# Patient Record
Sex: MALE | Race: BLACK OR AFRICAN AMERICAN | NOT HISPANIC OR LATINO | Employment: FULL TIME | URBAN - METROPOLITAN AREA
[De-identification: names, ages, dates, MRNs, and addresses within clinical notes are randomized per-mention and may not be internally consistent; named-entity substitution may affect disease eponyms.]

---

## 2017-08-07 ENCOUNTER — OFFICE VISIT (OUTPATIENT)
Dept: OPTOMETRY | Facility: CLINIC | Age: 47
End: 2017-08-07
Payer: COMMERCIAL

## 2017-08-07 DIAGNOSIS — H52.4 HYPEROPIA WITH PRESBYOPIA, BILATERAL: ICD-10-CM

## 2017-08-07 DIAGNOSIS — E11.9 TYPE 2 DIABETES MELLITUS WITHOUT RETINOPATHY: Primary | ICD-10-CM

## 2017-08-07 DIAGNOSIS — H52.03 HYPEROPIA WITH PRESBYOPIA, BILATERAL: ICD-10-CM

## 2017-08-07 DIAGNOSIS — H25.13 NUCLEAR SCLEROSIS, BILATERAL: ICD-10-CM

## 2017-08-07 PROCEDURE — 99999 PR PBB SHADOW E&M-EST. PATIENT-LVL II: CPT | Mod: PBBFAC,,, | Performed by: OPTOMETRIST

## 2017-08-07 PROCEDURE — 92014 COMPRE OPH EXAM EST PT 1/>: CPT | Mod: S$GLB,,, | Performed by: OPTOMETRIST

## 2017-08-07 PROCEDURE — 92015 DETERMINE REFRACTIVE STATE: CPT | Mod: S$GLB,,, | Performed by: OPTOMETRIST

## 2017-08-07 NOTE — PROGRESS NOTES
"Subjective:       Patient ID: Jaswinder Valle is a 47 y.o. male      Chief Complaint   Patient presents with    Diabetic Eye Exam     History of Present Illness  Dls: 6/30/16 Dr. Mora    Pt here for diabetic eye exam.   Pt c/o blurry vision at near ou. Pt wears single vision glasses for   reading and otc readers. Pt states no tearing no itching no burning no   pain no ha's no floaters.     Eye meds:   None    Hemoglobin A1C       Date                     Value               Ref Range           Status                06/29/2016               8.0 (H)             4.5 - 6.2 %         Final                 10/18/2011               8.4 (H)             4.0 - 6.2 %         Final            ----------        Assessment/Plan:     1. Type 2 diabetes mellitus without retinopathy  No diabetic retinopathy. Educated patient on importance of good blood sugar control, compliance with meds, and follow up care with PCP. Return in 1 year for dilated eye exam, sooner PRN.    2. Nuclear sclerosis, bilateral  Educated patient on the presence of cataracts and effects on vision, including clouded, blurred or dim vision, increasing difficulty with vision at night, sensitivity to light and glare, need for brighter light for reading and other activities, and seeing "halos" around lights. No surgery at this time. Recheck in one year.    3. Hyperopia with presbyopia, bilateral  Educated patient on refractive error and discussed lens options. Dispensed updated spectacle Rx. Educated about adaptation period to new specs.    Eyeglass Final Rx     Eyeglass Final Rx       Sphere Cylinder Axis Add    Right +0.50 Sphere  +1.75    Left +0.50 +0.25 025 +1.75    Expiration Date:  8/8/2018                  Return in about 1 year (around 8/7/2018) for Diabetic Eye Exam.       "

## 2017-09-18 ENCOUNTER — TELEPHONE (OUTPATIENT)
Dept: FAMILY MEDICINE | Facility: CLINIC | Age: 47
End: 2017-09-18

## 2017-09-18 DIAGNOSIS — E11.9 DIABETES MELLITUS TYPE 2 WITHOUT RETINOPATHY: Primary | ICD-10-CM

## 2017-09-18 NOTE — TELEPHONE ENCOUNTER
Patient is schedule on 9-26-17 for his physical he will like labs prior to appt on 9-22-17 please orders labs so I can schedule.

## 2017-09-18 NOTE — TELEPHONE ENCOUNTER
----- Message from Flor Pierce sent at 9/18/2017 11:32 AM CDT -----  Contact: self  Pt calling to schedule a phy and labs. Please call [phone]

## 2017-09-22 ENCOUNTER — LAB VISIT (OUTPATIENT)
Dept: LAB | Facility: HOSPITAL | Age: 47
End: 2017-09-22
Attending: FAMILY MEDICINE
Payer: COMMERCIAL

## 2017-09-22 DIAGNOSIS — E11.9 DIABETES MELLITUS TYPE 2 WITHOUT RETINOPATHY: ICD-10-CM

## 2017-09-22 LAB
ALBUMIN SERPL BCP-MCNC: 3.5 G/DL
ALP SERPL-CCNC: 96 U/L
ALT SERPL W/O P-5'-P-CCNC: 37 U/L
ANION GAP SERPL CALC-SCNC: 9 MMOL/L
AST SERPL-CCNC: 25 U/L
BASOPHILS # BLD AUTO: 0.01 K/UL
BASOPHILS NFR BLD: 0.2 %
BILIRUB SERPL-MCNC: 1.7 MG/DL
BUN SERPL-MCNC: 18 MG/DL
CALCIUM SERPL-MCNC: 8.8 MG/DL
CHLORIDE SERPL-SCNC: 103 MMOL/L
CHOLEST SERPL-MCNC: 199 MG/DL
CHOLEST/HDLC SERPL: 4.7 {RATIO}
CO2 SERPL-SCNC: 27 MMOL/L
CREAT SERPL-MCNC: 1.3 MG/DL
DIFFERENTIAL METHOD: NORMAL
EOSINOPHIL # BLD AUTO: 0.1 K/UL
EOSINOPHIL NFR BLD: 2 %
ERYTHROCYTE [DISTWIDTH] IN BLOOD BY AUTOMATED COUNT: 13.7 %
EST. GFR  (AFRICAN AMERICAN): >60 ML/MIN/1.73 M^2
EST. GFR  (NON AFRICAN AMERICAN): >60 ML/MIN/1.73 M^2
ESTIMATED AVG GLUCOSE: 240 MG/DL
GLUCOSE SERPL-MCNC: 210 MG/DL
HBA1C MFR BLD HPLC: 10 %
HCT VFR BLD AUTO: 42 %
HDLC SERPL-MCNC: 42 MG/DL
HDLC SERPL: 21.1 %
HGB BLD-MCNC: 14.4 G/DL
LDLC SERPL CALC-MCNC: 132.4 MG/DL
LYMPHOCYTES # BLD AUTO: 1.5 K/UL
LYMPHOCYTES NFR BLD: 26.8 %
MCH RBC QN AUTO: 29.1 PG
MCHC RBC AUTO-ENTMCNC: 34.3 G/DL
MCV RBC AUTO: 85 FL
MONOCYTES # BLD AUTO: 0.4 K/UL
MONOCYTES NFR BLD: 6.6 %
NEUTROPHILS # BLD AUTO: 3.6 K/UL
NEUTROPHILS NFR BLD: 64.2 %
NONHDLC SERPL-MCNC: 157 MG/DL
PLATELET # BLD AUTO: 160 K/UL
PMV BLD AUTO: 11.8 FL
POTASSIUM SERPL-SCNC: 3.8 MMOL/L
PROT SERPL-MCNC: 7 G/DL
RBC # BLD AUTO: 4.95 M/UL
SODIUM SERPL-SCNC: 139 MMOL/L
TRIGL SERPL-MCNC: 123 MG/DL
TSH SERPL DL<=0.005 MIU/L-ACNC: 1.05 UIU/ML
WBC # BLD AUTO: 5.63 K/UL

## 2017-09-22 PROCEDURE — 84443 ASSAY THYROID STIM HORMONE: CPT

## 2017-09-22 PROCEDURE — 83036 HEMOGLOBIN GLYCOSYLATED A1C: CPT

## 2017-09-22 PROCEDURE — 85025 COMPLETE CBC W/AUTO DIFF WBC: CPT

## 2017-09-22 PROCEDURE — 36415 COLL VENOUS BLD VENIPUNCTURE: CPT | Mod: PO

## 2017-09-22 PROCEDURE — 80061 LIPID PANEL: CPT

## 2017-09-22 PROCEDURE — 80053 COMPREHEN METABOLIC PANEL: CPT

## 2017-09-26 ENCOUNTER — OFFICE VISIT (OUTPATIENT)
Dept: FAMILY MEDICINE | Facility: CLINIC | Age: 47
End: 2017-09-26
Payer: COMMERCIAL

## 2017-09-26 VITALS
BODY MASS INDEX: 28.78 KG/M2 | DIASTOLIC BLOOD PRESSURE: 82 MMHG | WEIGHT: 201.06 LBS | OXYGEN SATURATION: 97 % | SYSTOLIC BLOOD PRESSURE: 110 MMHG | HEIGHT: 70 IN | HEART RATE: 82 BPM | TEMPERATURE: 98 F

## 2017-09-26 DIAGNOSIS — Z23 NEED FOR 23-POLYVALENT PNEUMOCOCCAL POLYSACCHARIDE VACCINE: ICD-10-CM

## 2017-09-26 DIAGNOSIS — Z00.00 ANNUAL PHYSICAL EXAM: Primary | ICD-10-CM

## 2017-09-26 PROCEDURE — 99396 PREV VISIT EST AGE 40-64: CPT | Mod: 25,S$GLB,, | Performed by: FAMILY MEDICINE

## 2017-09-26 PROCEDURE — 90732 PPSV23 VACC 2 YRS+ SUBQ/IM: CPT | Mod: S$GLB,,, | Performed by: FAMILY MEDICINE

## 2017-09-26 PROCEDURE — 99999 PR PBB SHADOW E&M-EST. PATIENT-LVL III: CPT | Mod: PBBFAC,,, | Performed by: FAMILY MEDICINE

## 2017-09-26 PROCEDURE — 90471 IMMUNIZATION ADMIN: CPT | Mod: S$GLB,,, | Performed by: FAMILY MEDICINE

## 2017-09-26 RX ORDER — METFORMIN HYDROCHLORIDE 500 MG/1
500 TABLET ORAL 2 TIMES DAILY WITH MEALS
Qty: 180 TABLET | Refills: 0 | Status: SHIPPED | OUTPATIENT
Start: 2017-09-26 | End: 2018-02-18 | Stop reason: SDUPTHER

## 2017-09-26 RX ORDER — LEVOFLOXACIN 500 MG/1
TABLET, FILM COATED ORAL
COMMUNITY
Start: 2017-09-20 | End: 2018-04-06

## 2017-09-26 RX ORDER — MUPIROCIN 20 MG/G
OINTMENT TOPICAL
COMMUNITY
Start: 2017-09-20 | End: 2018-04-06

## 2017-09-26 NOTE — PROGRESS NOTES
Office Visit    Patient Name: Jaswinder Valle    : 1970  MRN: [id number]    Subjective:  Jaswinder is a 47 y.o. male who presents today for:    Annual Exam      This patient has multiple medical diagnoses as noted below.  This patient is known to me and to this clinic. He reports soreness in his neck.  Patient denies any new symptoms including chest pain, SOB, blurry vision, N/V, diarrhea.  He is otherwise doing well.  We discussed the need for insulin and drastic diet changes.        Patient Active Problem List   Diagnosis    Diabetes type 2, uncontrolled    Diabetes mellitus type 2 without retinopathy    Nuclear sclerosis of both eyes    Hyperopia with presbyopia of both eyes       History reviewed. No pertinent surgical history.    Family History   Problem Relation Age of Onset    Cataracts Mother     Diabetes Mother     No Known Problems Father     No Known Problems Sister     No Known Problems Brother     No Known Problems Maternal Aunt     No Known Problems Maternal Uncle     No Known Problems Paternal Aunt     No Known Problems Paternal Uncle     No Known Problems Maternal Grandmother     No Known Problems Maternal Grandfather     No Known Problems Paternal Grandmother     No Known Problems Paternal Grandfather     Amblyopia Neg Hx     Blindness Neg Hx     Cancer Neg Hx     Hypertension Neg Hx     Macular degeneration Neg Hx     Retinal detachment Neg Hx     Strabismus Neg Hx     Stroke Neg Hx     Thyroid disease Neg Hx     Glaucoma Neg Hx        Social History     Social History    Marital status:      Spouse name: N/A    Number of children: N/A    Years of education: N/A     Occupational History    Not on file.     Social History Main Topics    Smoking status: Never Smoker    Smokeless tobacco: Never Used    Alcohol use Yes    Drug use: No    Sexual activity: Yes     Partners: Female     Other Topics Concern    Not on file     Social History Narrative     No narrative on file       Current Medications  Medications reviewed and updated.     Allergies   Review of patient's allergies indicates:   Allergen Reactions    No known drug allergies          Labs  Lab Results   Component Value Date    HGBA1C 10.0 (H) 09/22/2017     Lab Results   Component Value Date     09/22/2017    K 3.8 09/22/2017     09/22/2017    CO2 27 09/22/2017    BUN 18 09/22/2017    CREATININE 1.3 09/22/2017    CALCIUM 8.8 09/22/2017    ANIONGAP 9 09/22/2017    ESTGFRAFRICA >60.0 09/22/2017    EGFRNONAA >60.0 09/22/2017     Lab Results   Component Value Date    CHOL 199 09/22/2017    CHOL 209 (H) 06/29/2016    CHOL 223 (H) 10/18/2011     Lab Results   Component Value Date    HDL 42 09/22/2017    HDL 43 06/29/2016    HDL 39 (L) 10/18/2011     Lab Results   Component Value Date    LDLCALC 132.4 09/22/2017    LDLCALC 144.2 06/29/2016    LDLCALC 144.6 10/18/2011     Lab Results   Component Value Date    TRIG 123 09/22/2017    TRIG 109 06/29/2016    TRIG 197 (H) 10/18/2011     Lab Results   Component Value Date    CHOLHDL 21.1 09/22/2017    CHOLHDL 20.6 06/29/2016    CHOLHDL 17.5 (L) 10/18/2011     Last set of blood work has been reviewed as noted above.    Review of Systems   Constitutional: Negative for activity change, appetite change, fatigue, fever and unexpected weight change.   HENT: Negative for facial swelling.    Eyes: Negative for visual disturbance.   Respiratory: Negative for chest tightness, shortness of breath, wheezing and stridor.    Cardiovascular: Negative for chest pain, palpitations and leg swelling.   Gastrointestinal: Negative for abdominal distention, abdominal pain, blood in stool, constipation, diarrhea, nausea and vomiting.   Endocrine: Negative for cold intolerance, heat intolerance, polydipsia and polyuria.   Genitourinary: Negative.  Negative for testicular pain and urgency.   Skin: Negative.    Allergic/Immunologic: Negative.    Neurological: Negative for  "dizziness, weakness, light-headedness, numbness and headaches.   Psychiatric/Behavioral: Negative for agitation and decreased concentration.       /82 (BP Location: Left arm, Patient Position: Sitting, BP Method: Large (Manual))   Pulse 82   Temp 98.3 °F (36.8 °C) (Oral)   Ht 5' 10" (1.778 m)   Wt 91.2 kg (201 lb 1 oz)   SpO2 97%   BMI 28.85 kg/m²      Physical Exam   Constitutional: He is oriented to person, place, and time. He appears well-developed and well-nourished.   HENT:   Head: Normocephalic and atraumatic.   Right Ear: External ear normal.   Left Ear: External ear normal.   Nose: Nose normal.   Mouth/Throat: Oropharynx is clear and moist. No oropharyngeal exudate.   Eyes: Conjunctivae and EOM are normal. Pupils are equal, round, and reactive to light.   Neck: Normal range of motion. Neck supple.   Cardiovascular: Normal rate, regular rhythm, normal heart sounds and intact distal pulses.  Exam reveals no gallop and no friction rub.    No murmur heard.  Pulmonary/Chest: Effort normal and breath sounds normal.   Abdominal: Soft. Bowel sounds are normal.   Musculoskeletal: Normal range of motion.   Neurological: He is alert and oriented to person, place, and time. He has normal reflexes.   Skin: Skin is warm and dry.   Psychiatric: He has a normal mood and affect. His behavior is normal. Thought content normal.   Vitals reviewed.      Health Maintenance  Health Maintenance       Date Due Completion Date    Urine Microalbumin 02/23/1978 ---    TETANUS VACCINE 02/23/1986 ---    Pneumococcal PPSV23 (Medium Risk) (1) 02/23/1986 ---    Foot Exam 06/30/2017 6/30/2016    Influenza Vaccine 08/01/2017 ---    Hemoglobin A1c 03/22/2018 9/22/2017    Eye Exam 08/07/2018 8/7/2017    Override on 8/7/2017: Done    Lipid Panel 09/22/2018 9/22/2017          Assessment/Plan:  Jaswinder Valle is a 47 y.o. male who presents today for :    1. Annual physical exam    2. Uncontrolled type 2 diabetes mellitus " without complication, without long-term current use of insulin    3. Need for 23-polyvalent pneumococcal polysaccharide vaccine        Problem List Items Addressed This Visit        Unprioritized    Diabetes type 2, uncontrolled    Relevant Medications    metformin (GLUCOPHAGE) 500 MG tablet  -  Drastically change diet   -    I have discussed the common side effects of this medication with the patient and answered all of the questions they had at the time of this visit regarding this medication.  -  rtc in 3 months         Other Visit Diagnoses     Annual physical exam    -  Primary  -  I addressed all major concerns as it related to health maintenance.  All were ordered and scheduled based on the patients wishes.  Any additional health maintenance will be readdressed at the next physical if declined or deferred by the patient.      Need for 23-polyvalent pneumococcal polysaccharide vaccine        Relevant Orders    (In Office Administered) Pneumococcal Polysaccharide Vaccine (23 Valent) (SQ/IM) (Completed)      Strongly expressed the need to be on insulin.  He reports that he will do what is necessary to improve his health.     Return in about 3 months (around 12/26/2017).     This note was created by combination of typed  and Dragon dictation.  Transcription errors may be present.  If there are any questions, please contact me.

## 2017-09-29 DIAGNOSIS — E11.9 TYPE 2 DIABETES MELLITUS WITHOUT COMPLICATION: ICD-10-CM

## 2018-02-14 RX ORDER — METFORMIN HYDROCHLORIDE 500 MG/1
TABLET ORAL
Qty: 180 TABLET | Refills: 0 | OUTPATIENT
Start: 2018-02-14

## 2018-02-19 RX ORDER — METFORMIN HYDROCHLORIDE 500 MG/1
TABLET ORAL
Qty: 180 TABLET | Refills: 0 | Status: SHIPPED | OUTPATIENT
Start: 2018-02-19 | End: 2018-04-06

## 2018-03-09 DIAGNOSIS — Z12.11 COLON CANCER SCREENING: ICD-10-CM

## 2018-04-02 ENCOUNTER — TELEPHONE (OUTPATIENT)
Dept: FAMILY MEDICINE | Facility: CLINIC | Age: 48
End: 2018-04-02

## 2018-04-02 DIAGNOSIS — Z12.5 PROSTATE CANCER SCREENING: ICD-10-CM

## 2018-04-02 NOTE — TELEPHONE ENCOUNTER
----- Message from Christina Hauser sent at 4/2/2018 11:52 AM CDT -----  Contact: [phone]  Pt is requesting his orders for blood work for a complete panel Please call pt at your earliest convenience.  Thanks !

## 2018-04-05 ENCOUNTER — LAB VISIT (OUTPATIENT)
Dept: LAB | Facility: HOSPITAL | Age: 48
End: 2018-04-05
Attending: FAMILY MEDICINE
Payer: COMMERCIAL

## 2018-04-05 DIAGNOSIS — Z12.5 PROSTATE CANCER SCREENING: ICD-10-CM

## 2018-04-05 LAB
ALBUMIN SERPL BCP-MCNC: 3.8 G/DL
ALP SERPL-CCNC: 73 U/L
ALT SERPL W/O P-5'-P-CCNC: 22 U/L
ANION GAP SERPL CALC-SCNC: 9 MMOL/L
AST SERPL-CCNC: 18 U/L
BASOPHILS # BLD AUTO: 0.02 K/UL
BASOPHILS NFR BLD: 0.3 %
BILIRUB SERPL-MCNC: 1.5 MG/DL
BUN SERPL-MCNC: 15 MG/DL
CALCIUM SERPL-MCNC: 8.9 MG/DL
CHLORIDE SERPL-SCNC: 103 MMOL/L
CHOLEST SERPL-MCNC: 198 MG/DL
CHOLEST/HDLC SERPL: 3.7 {RATIO}
CO2 SERPL-SCNC: 27 MMOL/L
COMPLEXED PSA SERPL-MCNC: 0.32 NG/ML
CREAT SERPL-MCNC: 1.2 MG/DL
DIFFERENTIAL METHOD: NORMAL
EOSINOPHIL # BLD AUTO: 0.1 K/UL
EOSINOPHIL NFR BLD: 2 %
ERYTHROCYTE [DISTWIDTH] IN BLOOD BY AUTOMATED COUNT: 14.3 %
EST. GFR  (AFRICAN AMERICAN): >60 ML/MIN/1.73 M^2
EST. GFR  (NON AFRICAN AMERICAN): >60 ML/MIN/1.73 M^2
ESTIMATED AVG GLUCOSE: 163 MG/DL
GLUCOSE SERPL-MCNC: 141 MG/DL
HBA1C MFR BLD HPLC: 7.3 %
HCT VFR BLD AUTO: 43.7 %
HDLC SERPL-MCNC: 53 MG/DL
HDLC SERPL: 26.8 %
HGB BLD-MCNC: 14.3 G/DL
IMM GRANULOCYTES # BLD AUTO: 0.01 K/UL
IMM GRANULOCYTES NFR BLD AUTO: 0.2 %
LDLC SERPL CALC-MCNC: 128.2 MG/DL
LYMPHOCYTES # BLD AUTO: 1.7 K/UL
LYMPHOCYTES NFR BLD: 29.4 %
MCH RBC QN AUTO: 28.1 PG
MCHC RBC AUTO-ENTMCNC: 32.7 G/DL
MCV RBC AUTO: 86 FL
MONOCYTES # BLD AUTO: 0.5 K/UL
MONOCYTES NFR BLD: 8.1 %
NEUTROPHILS # BLD AUTO: 3.6 K/UL
NEUTROPHILS NFR BLD: 60 %
NONHDLC SERPL-MCNC: 145 MG/DL
NRBC BLD-RTO: 0 /100 WBC
PLATELET # BLD AUTO: 188 K/UL
PMV BLD AUTO: 11.6 FL
POTASSIUM SERPL-SCNC: 3.9 MMOL/L
PROT SERPL-MCNC: 7.4 G/DL
RBC # BLD AUTO: 5.09 M/UL
SODIUM SERPL-SCNC: 139 MMOL/L
TRIGL SERPL-MCNC: 84 MG/DL
TSH SERPL DL<=0.005 MIU/L-ACNC: 2.03 UIU/ML
WBC # BLD AUTO: 5.92 K/UL

## 2018-04-05 PROCEDURE — 84443 ASSAY THYROID STIM HORMONE: CPT

## 2018-04-05 PROCEDURE — 80061 LIPID PANEL: CPT

## 2018-04-05 PROCEDURE — 36415 COLL VENOUS BLD VENIPUNCTURE: CPT | Mod: PO

## 2018-04-05 PROCEDURE — 83036 HEMOGLOBIN GLYCOSYLATED A1C: CPT

## 2018-04-05 PROCEDURE — 84153 ASSAY OF PSA TOTAL: CPT

## 2018-04-05 PROCEDURE — 80053 COMPREHEN METABOLIC PANEL: CPT

## 2018-04-05 PROCEDURE — 85025 COMPLETE CBC W/AUTO DIFF WBC: CPT

## 2018-04-06 ENCOUNTER — OFFICE VISIT (OUTPATIENT)
Dept: FAMILY MEDICINE | Facility: CLINIC | Age: 48
End: 2018-04-06
Payer: COMMERCIAL

## 2018-04-06 VITALS
WEIGHT: 207.25 LBS | BODY MASS INDEX: 29.67 KG/M2 | TEMPERATURE: 99 F | DIASTOLIC BLOOD PRESSURE: 80 MMHG | OXYGEN SATURATION: 97 % | HEIGHT: 70 IN | SYSTOLIC BLOOD PRESSURE: 110 MMHG | HEART RATE: 75 BPM

## 2018-04-06 DIAGNOSIS — Z00.00 ANNUAL PHYSICAL EXAM: Primary | ICD-10-CM

## 2018-04-06 DIAGNOSIS — Z12.11 COLON CANCER SCREENING: ICD-10-CM

## 2018-04-06 PROCEDURE — 99396 PREV VISIT EST AGE 40-64: CPT | Mod: S$GLB,,, | Performed by: FAMILY MEDICINE

## 2018-04-06 PROCEDURE — 99999 PR PBB SHADOW E&M-EST. PATIENT-LVL III: CPT | Mod: PBBFAC,,, | Performed by: FAMILY MEDICINE

## 2018-04-06 NOTE — PROGRESS NOTES
Office Visit    Patient Name: Jaswinder Valle    : 1970  MRN: [id number]    Subjective:  Jaswinder is a 48 y.o. male who presents today for:    Annual Exam      This patient has multiple medical diagnoses as noted below.  This patient is known to me and to this clinic.   He reports increasing neck pain.    The 10-year ASCVD risk score (Myrtle GALLEGOS Jr., et al., 2013) is: 7.5%    Values used to calculate the score:      Age: 48 years      Sex: Male      Is Non- : Yes      Diabetic: Yes      Tobacco smoker: No      Systolic Blood Pressure: 110 mmHg      Is BP treated: No      HDL Cholesterol: 53 mg/dL      Total Cholesterol: 198 mg/dL  Patient Active Problem List   Diagnosis    Diabetes type 2, uncontrolled    Diabetes mellitus type 2 without retinopathy    Nuclear sclerosis of both eyes    Hyperopia with presbyopia of both eyes       History reviewed. No pertinent surgical history.    Family History   Problem Relation Age of Onset    Cataracts Mother     Diabetes Mother     No Known Problems Father     No Known Problems Sister     No Known Problems Brother     No Known Problems Maternal Aunt     No Known Problems Maternal Uncle     No Known Problems Paternal Aunt     No Known Problems Paternal Uncle     No Known Problems Maternal Grandmother     No Known Problems Maternal Grandfather     No Known Problems Paternal Grandmother     No Known Problems Paternal Grandfather     Amblyopia Neg Hx     Blindness Neg Hx     Cancer Neg Hx     Hypertension Neg Hx     Macular degeneration Neg Hx     Retinal detachment Neg Hx     Strabismus Neg Hx     Stroke Neg Hx     Thyroid disease Neg Hx     Glaucoma Neg Hx        Social History     Social History    Marital status:      Spouse name: N/A    Number of children: N/A    Years of education: N/A     Occupational History    Not on file.     Social History Main Topics    Smoking status: Never Smoker    Smokeless  tobacco: Never Used    Alcohol use Yes    Drug use: No    Sexual activity: Yes     Partners: Female     Other Topics Concern    Not on file     Social History Narrative    No narrative on file       Current Medications  Medications reviewed and updated.     Allergies   Review of patient's allergies indicates:   Allergen Reactions    No known drug allergies          Labs  Lab Results   Component Value Date    HGBA1C 7.3 (H) 04/05/2018     Lab Results   Component Value Date     04/05/2018    K 3.9 04/05/2018     04/05/2018    CO2 27 04/05/2018    BUN 15 04/05/2018    CREATININE 1.2 04/05/2018    CALCIUM 8.9 04/05/2018    ANIONGAP 9 04/05/2018    ESTGFRAFRICA >60.0 04/05/2018    EGFRNONAA >60.0 04/05/2018     Lab Results   Component Value Date    CHOL 198 04/05/2018    CHOL 199 09/22/2017    CHOL 209 (H) 06/29/2016     Lab Results   Component Value Date    HDL 53 04/05/2018    HDL 42 09/22/2017    HDL 43 06/29/2016     Lab Results   Component Value Date    LDLCALC 128.2 04/05/2018    LDLCALC 132.4 09/22/2017    LDLCALC 144.2 06/29/2016     Lab Results   Component Value Date    TRIG 84 04/05/2018    TRIG 123 09/22/2017    TRIG 109 06/29/2016     Lab Results   Component Value Date    CHOLHDL 26.8 04/05/2018    CHOLHDL 21.1 09/22/2017    CHOLHDL 20.6 06/29/2016     Last set of blood work has been reviewed as noted above.    Review of Systems   Constitutional: Negative for activity change, appetite change, fatigue, fever and unexpected weight change.   HENT: Negative for facial swelling.    Eyes: Negative for visual disturbance.   Respiratory: Negative for chest tightness, shortness of breath, wheezing and stridor.    Cardiovascular: Negative for chest pain, palpitations and leg swelling.   Gastrointestinal: Negative for abdominal distention, abdominal pain, blood in stool, constipation, diarrhea, nausea and vomiting.   Endocrine: Negative for cold intolerance, heat intolerance, polydipsia and polyuria.  "  Genitourinary: Negative.  Negative for testicular pain and urgency.   Musculoskeletal: Positive for neck pain.   Skin: Negative.    Allergic/Immunologic: Negative.    Neurological: Negative for dizziness, weakness, light-headedness, numbness and headaches.   Psychiatric/Behavioral: Negative for agitation and decreased concentration.       /80 (BP Location: Left arm, Patient Position: Sitting, BP Method: Large (Manual))   Pulse 75   Temp 98.5 °F (36.9 °C) (Oral)   Ht 5' 10" (1.778 m)   Wt 94 kg (207 lb 3.7 oz)   SpO2 97%   BMI 29.73 kg/m²      Physical Exam   Constitutional: He is oriented to person, place, and time. He appears well-developed and well-nourished.   HENT:   Head: Normocephalic and atraumatic.   Right Ear: External ear normal.   Left Ear: External ear normal.   Nose: Nose normal.   Mouth/Throat: Oropharynx is clear and moist. No oropharyngeal exudate.   Eyes: Conjunctivae and EOM are normal. Pupils are equal, round, and reactive to light.   Neck: Normal range of motion. Neck supple.   Cardiovascular: Normal rate, regular rhythm, normal heart sounds and intact distal pulses.  Exam reveals no gallop and no friction rub.    No murmur heard.  Pulmonary/Chest: Effort normal and breath sounds normal.   Abdominal: Soft. Bowel sounds are normal.   Musculoskeletal: Normal range of motion.   Neurological: He is alert and oriented to person, place, and time. He has normal reflexes.   Skin: Skin is warm and dry.   Psychiatric: He has a normal mood and affect. His behavior is normal. Thought content normal.   Vitals reviewed.        Protective Sensation (w/ 10 gram monofilament):  Right: Intact  Left: Intact    Visual Inspection:  Normal -  Bilateral    Pedal Pulses:   Right: Present  Left: Present    Posterior tibialis:   Right:Present  Left: Present    Health Maintenance  Health Maintenance       Date Due Completion Date    Urine Microalbumin 02/23/1978 ---    TETANUS VACCINE 02/23/1986 ---    Low " Dose Statin 02/23/1989 ---    Foot Exam 06/30/2017 6/30/2016    Influenza Vaccine 08/01/2017 ---    Colonoscopy 02/23/2018 ---    Eye Exam 08/07/2018 8/7/2017    Override on 8/7/2017: Done    Hemoglobin A1c 10/05/2018 4/5/2018    Lipid Panel 04/05/2019 4/5/2018    Pneumococcal PPSV23 (Medium Risk) (2) 02/23/2033 9/26/2017          Assessment/Plan:  Jaswinder Valle is a 48 y.o. male who presents today for :    1. Annual physical exam    2. Uncontrolled type 2 diabetes mellitus without complication, without long-term current use of insulin    3. Colon cancer screening        Problem List Items Addressed This Visit        Unprioritized    Diabetes type 2, uncontrolled  -  Controlled   -  Continue with diet changes       Other Visit Diagnoses     Annual physical exam    -  Primary  -  I addressed all major concerns as it related to health maintenance.  All were ordered and scheduled based on the patients wishes.  Any additional health maintenance will be readdressed at the next physical if declined or deferred by the patient.      Colon cancer screening        Relevant Orders    Case request GI: COLONOSCOPY (Completed)          Follow-up in about 6 months (around 10/6/2018) for Diabetes Mellitus II.     This note was created by combination of typed  and Dragon dictation.  Transcription errors may be present.  If there are any questions, please contact me.

## 2018-04-13 DIAGNOSIS — E11.9 DIABETES MELLITUS WITHOUT COMPLICATION: ICD-10-CM

## 2018-05-16 ENCOUNTER — ANESTHESIA (OUTPATIENT)
Dept: ENDOSCOPY | Facility: HOSPITAL | Age: 48
End: 2018-05-16
Payer: COMMERCIAL

## 2018-05-16 ENCOUNTER — HOSPITAL ENCOUNTER (OUTPATIENT)
Facility: HOSPITAL | Age: 48
Discharge: HOME | End: 2018-05-16
Attending: INTERNAL MEDICINE | Admitting: INTERNAL MEDICINE
Payer: COMMERCIAL

## 2018-05-16 ENCOUNTER — ANESTHESIA EVENT (OUTPATIENT)
Dept: ENDOSCOPY | Facility: HOSPITAL | Age: 48
End: 2018-05-16
Payer: COMMERCIAL

## 2018-05-16 VITALS
SYSTOLIC BLOOD PRESSURE: 132 MMHG | BODY MASS INDEX: 27.92 KG/M2 | WEIGHT: 195 LBS | TEMPERATURE: 98 F | DIASTOLIC BLOOD PRESSURE: 70 MMHG | RESPIRATION RATE: 18 BRPM | HEART RATE: 68 BPM | OXYGEN SATURATION: 98 % | HEIGHT: 70 IN

## 2018-05-16 DIAGNOSIS — Z12.11 COLON CANCER SCREENING: ICD-10-CM

## 2018-05-16 PROCEDURE — D9220A PRA ANESTHESIA: Mod: PT,ANES,, | Performed by: ANESTHESIOLOGY

## 2018-05-16 PROCEDURE — 25000003 PHARM REV CODE 250: Performed by: ANESTHESIOLOGY

## 2018-05-16 PROCEDURE — G0121 COLON CA SCRN NOT HI RSK IND: HCPCS | Performed by: INTERNAL MEDICINE

## 2018-05-16 PROCEDURE — 37000009 HC ANESTHESIA EA ADD 15 MINS: Performed by: INTERNAL MEDICINE

## 2018-05-16 PROCEDURE — 63600175 PHARM REV CODE 636 W HCPCS: Performed by: NURSE ANESTHETIST, CERTIFIED REGISTERED

## 2018-05-16 PROCEDURE — D9220A PRA ANESTHESIA: Mod: PT,CRNA,, | Performed by: NURSE ANESTHETIST, CERTIFIED REGISTERED

## 2018-05-16 PROCEDURE — 37000008 HC ANESTHESIA 1ST 15 MINUTES: Performed by: INTERNAL MEDICINE

## 2018-05-16 RX ORDER — PROPOFOL 10 MG/ML
VIAL (ML) INTRAVENOUS
Status: DISCONTINUED | OUTPATIENT
Start: 2018-05-16 | End: 2018-05-16

## 2018-05-16 RX ORDER — PROPOFOL 10 MG/ML
VIAL (ML) INTRAVENOUS
Status: DISCONTINUED
Start: 2018-05-16 | End: 2018-05-16 | Stop reason: HOSPADM

## 2018-05-16 RX ORDER — LIDOCAINE HCL/PF 100 MG/5ML
SYRINGE (ML) INTRAVENOUS
Status: DISCONTINUED | OUTPATIENT
Start: 2018-05-16 | End: 2018-05-16

## 2018-05-16 RX ORDER — LIDOCAINE HYDROCHLORIDE 10 MG/ML
1 INJECTION, SOLUTION EPIDURAL; INFILTRATION; INTRACAUDAL; PERINEURAL ONCE
Status: DISCONTINUED | OUTPATIENT
Start: 2018-05-16 | End: 2018-05-16 | Stop reason: HOSPADM

## 2018-05-16 RX ORDER — SODIUM CHLORIDE 9 MG/ML
INJECTION, SOLUTION INTRAVENOUS CONTINUOUS
Status: DISCONTINUED | OUTPATIENT
Start: 2018-05-16 | End: 2018-05-16 | Stop reason: HOSPADM

## 2018-05-16 RX ORDER — LIDOCAINE HYDROCHLORIDE 20 MG/ML
INJECTION, SOLUTION EPIDURAL; INFILTRATION; INTRACAUDAL; PERINEURAL
Status: DISCONTINUED
Start: 2018-05-16 | End: 2018-05-16 | Stop reason: HOSPADM

## 2018-05-16 RX ADMIN — LIDOCAINE HYDROCHLORIDE 100 MG: 20 INJECTION, SOLUTION INTRAVENOUS at 09:05

## 2018-05-16 RX ADMIN — PROPOFOL 30 MG: 10 INJECTION, EMULSION INTRAVENOUS at 09:05

## 2018-05-16 RX ADMIN — PROPOFOL 100 MG: 10 INJECTION, EMULSION INTRAVENOUS at 09:05

## 2018-05-16 RX ADMIN — SODIUM CHLORIDE: 0.9 INJECTION, SOLUTION INTRAVENOUS at 09:05

## 2018-05-16 NOTE — H&P
Pre-Procedure H&P:  Reason for Procedure: screen    HPI:  Pt is a 48 y.o. male screen    Past Medical History:   Diagnosis Date    Diabetes mellitus type 2 without retinopathy 6/30/2016    Diabetes mellitus, type 2     Nuclear sclerosis of both eyes 6/30/2016       History reviewed. No pertinent surgical history.    Family History   Problem Relation Age of Onset    Cataracts Mother     Diabetes Mother     No Known Problems Father     No Known Problems Sister     No Known Problems Brother     No Known Problems Maternal Aunt     No Known Problems Maternal Uncle     No Known Problems Paternal Aunt     No Known Problems Paternal Uncle     No Known Problems Maternal Grandmother     No Known Problems Maternal Grandfather     No Known Problems Paternal Grandmother     No Known Problems Paternal Grandfather     Amblyopia Neg Hx     Blindness Neg Hx     Cancer Neg Hx     Hypertension Neg Hx     Macular degeneration Neg Hx     Retinal detachment Neg Hx     Strabismus Neg Hx     Stroke Neg Hx     Thyroid disease Neg Hx     Glaucoma Neg Hx        Social History     Social History    Marital status:      Spouse name: N/A    Number of children: N/A    Years of education: N/A     Occupational History    Not on file.     Social History Main Topics    Smoking status: Never Smoker    Smokeless tobacco: Never Used    Alcohol use Yes    Drug use: No    Sexual activity: Yes     Partners: Female     Other Topics Concern    Not on file     Social History Narrative    No narrative on file       Endoscopic History:      Review of patient's allergies indicates:   Allergen Reactions    No known drug allergies        No current facility-administered medications on file prior to encounter.      No current outpatient prescriptions on file prior to encounter.     No current facility-administered medications for this encounter.     ROS: Negative x 10    No data found.      Gen: Well developed, well  nourished, no apparent distress  HEENT: Anicteric, PERRLA  CV: S1, S2, no murmers/rubs, non-displaced PMI  Lungs: CTA-B, normal excursion  Abd: Soft, NT, ND, normal BS's, no HSM  Ext: No c/c/e, 1+ DP pulses to BLE's  Neuro: CN II-XII grossly intact, no asterixis.  Skin: No rashes/lesions.  Psych: AA&O x 4    Assessment:  Pt. Is a 48 y.o. male with:  1. screen    Recommendations:  1. Colonoscopy  2. F/U with PCP      I would like to take this opportunity to thank you for this consult.  If you have any questions or concerns, please do not hesitate to contact me.

## 2018-05-16 NOTE — TRANSFER OF CARE
"Anesthesia Transfer of Care Note    Patient: Jaswinder Valle    Procedure(s) Performed: Procedure(s) (LRB):  COLONOSCOPY (N/A)    Patient location: GI    Anesthesia Type: general    Transport from OR: Transported from OR on room air with adequate spontaneous ventilation    Post pain: adequate analgesia    Post assessment: no apparent anesthetic complications and tolerated procedure well    Post vital signs: stable    Level of consciousness: awake and alert    Nausea/Vomiting: no nausea/vomiting    Complications: none    Transfer of care protocol was followed      Last vitals:   Visit Vitals  /67 (BP Location: Left arm, Patient Position: Lying)   Pulse 76   Temp 36.6 °C (97.9 °F) (Oral)   Resp 16   Ht 5' 10" (1.778 m)   Wt 88.5 kg (195 lb)   SpO2 96%   BMI 27.98 kg/m²     "

## 2018-05-16 NOTE — ANESTHESIA PREPROCEDURE EVALUATION
05/16/2018  Jaswinder Valle is a 48 y.o., male.    Anesthesia Evaluation    I have reviewed the Patient Summary Reports.    I have reviewed the Nursing Notes.   I have reviewed the Medications.     Review of Systems  Anesthesia Hx:  No problems with previous Anesthesia Denies Hx of Anesthetic complications  Neg history of prior surgery. Denies Family Hx of Anesthesia complications.   Denies Personal Hx of Anesthesia complications.   Social:  Non-Smoker, No Alcohol Use    Hematology/Oncology:  Hematology Normal   Oncology Normal     EENT/Dental:EENT/Dental Normal   Cardiovascular:  Cardiovascular Normal Exercise tolerance: good     Pulmonary:  Pulmonary Normal    Renal/:  Renal/ Normal     Hepatic/GI:  Hepatic/GI Normal    Musculoskeletal:  Musculoskeletal Normal    Neurological:  Neurology Normal    Endocrine:   Diabetes, type 2    Dermatological:  Skin Normal    Psych:  Psychiatric Normal           Physical Exam  General:  Well nourished    Airway/Jaw/Neck:  Airway Findings: Mouth Opening: Normal General Airway Assessment: Adult  Mallampati: II  TM Distance: Normal, at least 6 cm         Dental:  DENTAL FINDINGS: Normal   Chest/Lungs:  Chest/Lungs Clear    Heart/Vascular:  Heart Findings: Normal Heart murmur: negative       Mental Status:  Mental Status Findings:  Cooperative, Alert and Oriented         Anesthesia Plan  Type of Anesthesia, risks & benefits discussed:  Anesthesia Type:  general  Patient's Preference:   Intra-op Monitoring Plan:   Intra-op Monitoring Plan Comments:   Post Op Pain Control Plan:   Post Op Pain Control Plan Comments:   Induction:   IV  Beta Blocker:  Patient is not currently on a Beta-Blocker (No further documentation required).       Informed Consent: Patient understands risks and agrees with Anesthesia plan.  Questions answered. Anesthesia consent signed with patient.  ASA  Score: 2     Day of Surgery Review of History & Physical:            Ready For Surgery From Anesthesia Perspective.

## 2018-05-16 NOTE — DISCHARGE INSTRUCTIONS
Diverticulosis    Diverticulosis means that small pouches have formed in the wall of your large intestine (colon). Most often, this problem causes no symptoms and is common as people age. But the pouches in the colon are at risk of becoming infected. When this happens, the condition is called diverticulitis. Although most people with diverticulosis never develop diverticulitis, it is still not uncommon. Rectal bleeding can also occur and in less common situations, a type of colon inflammation called colitis.  While most people do not have symptoms, some people with diverticulosis may have:  · Abdominal cramps and pain  · Bloating  · Constipation  · Change in bowel habits  Causes  The exact cause of diverticulosis (and diverticulitis) has not been proved, but a few things are associated with the condition:  · Low-fiber diet  · Constipation  · Lack of exercise  Your healthcare provider will talk with you about how to manage your condition. Diet changes may be all that are needed to help control diverticulosis and prevent progression to diverticulitis. If you develop diverticulitis, you will likely need other treatments.  Home care  You may be told to take fiber supplements daily. Fiber adds bulk to the stool so that it passes through the colon more easily. Stool softeners may be recommended. You may also be given medications for pain relief. Be sure to take all medications as directed.  In the past, people were told to avoid corn, nuts, and seeds. This is no longer necessary.  Follow these guidelines when caring for yourself at home:  · Eat unprocessed foods that are high in fiber. Whole grains, fruits, and vegetables are good choices.  · Drink 6 to 8 glasses of water every day unless your healthcare provider has you limit how much fluid you should have.  · Watch for changes in your bowel movements. Tell your provider if you notice any changes.  · Begin an exercise program. Ask your provider how to get started.  Generally, walking is the best.  · Get plenty of rest and sleep.  Follow-up care  Follow up with your healthcare provider, or as advised. Regular visits may be needed to check on your health. Sometimes special procedures such as colonoscopy, are needed after an episode of diverticulitis or blooding. Be sure to keep all your appointments.  If a stool sample was taken, or cultures were done, you should be told if they are positive, or if your treatment needs to be changed. You can call as directed for the results.  If X-rays were done, a radiologist will look at them. You will be told if there is a change in your treatment.  If antibiotics were prescribed, be sure to finish them all.  When to seek medical advice  Call your healthcare provider right away if any of these occur:  · Fever of 100.4°F (38°C) or higher, or as directed by your healthcare provider  · Severe cramps in the lower left side of the abdomen or pain that is getting worse  · Tenderness in the lower left side of the abdomen or worsening pain throughout the abdomen  · Diarrhea or constipation that doesn't get better within 24 hours  · Nausea and vomiting  · Bleeding from the rectum  Call 911  Call emergency services if any of the following occur:  · Trouble breathing  · Confusion  · Very drowsy or trouble awakening  · Fainting or loss of consciousness  · Rapid heart rate  · Chest pain  Date Last Reviewed: 12/30/2015 © 2000-2017 [employer]. [address] [place] [address]. All rights reserved. This information is not intended as a substitute for professional medical care. Always follow your healthcare professional's instructions.        High-Fiber Diet  Fiber is in fruits, vegetables, cereals, and grains. Fiber passes through your body undigested. A high-fiber diet helps food move through your intestinal tract. The added bulk is helpful in preventing constipation. In people with diverticulosis, fiber helps clean out the pouches  along the colon wall. It also prevents new pouches from forming. A high-fiber diet reduces the risk of colon cancer. It also lowers blood cholesterol and prevents high blood sugar in people with diabetes.    The fiber-rich foods listed below should be part of your diet. If you are not used to high-fiber foods, start with 1 or 2 foods from this list. Every 3 to 4 days add a new one to your diet. Do this until you are eating 4 high-fiber foods per day. This should give you 20 to 35 grams of fiber a day. It is also important to drink a lot of water when you are on this diet. You should have 6 to 8 glasses of water a day. Water makes the fiber swell and increases the benefit.  Foods high in dietary fiber  The following foods are high in dietary fiber:  · Breads. Breads made with 100% whole-wheat flour; sahil, wheat, or rye crackers; whole-grain tortillas, bran muffins.  · Cereals. Whole-grain and bran cereals with bran (shredded wheat, wheat flakes, raisin bran, corn bran); oatmeal, rolled oats, granola, and brown rice.  · Fruits. Fresh fruits and their edible skins (pears, prunes, raisins, berries, apples, and apricots); bananas, citrus fruit, mangoes, pineapple; and prune juice.  · Nuts. Any nuts and seeds.  · Vegetables. Best served raw or lightly cooked. All types, especially: green peas, celery, eggplant, potatoes, spinach, broccoli, [place] sprouts, winter squash, carrots, cauliflower, soybeans, lentils, and fresh and dried beans of all kinds.  · Other. Popcorn, any spices.  Date Last Reviewed: 8/1/2016  © 1817-4540 [employer]. [address] [place] [address], [place] [address]. All rights reserved. This information is not intended as a substitute for professional medical care. Always follow your healthcare professional's instructions.

## 2018-05-16 NOTE — PROVATION PATIENT INSTRUCTIONS
Discharge Summary/Instructions after an Endoscopic Procedure  Patient Name: Jaswinder Valle  Patient MRN: [id number]  Patient YOB: 1970  Wednesday, May 16, 2018  Jovany Mendiola MD  RESTRICTIONS:  During your procedure today, you received medications for sedation.  These   medications may affect your judgment, balance and coordination.  Therefore,   for 24 hours, you have the following restrictions:   - DO NOT drive a car, operate machinery, make legal/financial decisions,   sign important papers or drink alcohol.    ACTIVITY:  The following day: return to full activity including work, except no heavy   lifting, straining or running for 3 days if polyps were removed.  DIET:  Eat and drink normally unless instructed otherwise.     TREATMENT FOR COMMON SIDE EFFECTS:  - Mild abdominal pain, nausea, belching, bloating or excessive gas:  rest,   eat lightly and use a heating pad.  - Sore Throat: treat with throat lozenges and/or gargle with warm salt   water.  - Because air was used during the procedure, expelling large amounts of air   from your rectum or belching is normal.  - If a bowel prep was taken, you may not have a bowel movement for 1-3 days.    This is normal.  SYMPTOMS TO WATCH FOR AND REPORT TO YOUR PHYSICIAN:  1. Abdominal pain or bloating, other than gas cramps.  2. Chest pain.  3. Back pain.  4. Signs of infection such as: chills or fever occurring within 24 hours   after the procedure.  5. Rectal bleeding, which would show as bright red, maroon, or black stools.   (A tablespoon of blood from the rectum is not serious, especially if   hemorrhoids are present.)  6. Vomiting.  7. Weakness or dizziness.  GO DIRECTLY TO THE NEAREST EMERGENCY ROOM IF YOU HAVE ANY OF THE FOLLOWING:      Difficulty breathing              Chills and/or fever over 101 F   Persistent vomiting and/or vomiting blood   Severe abdominal pain   Severe chest pain   Black, tarry stools   Bleeding- more than one tablespoon   Any  other symptom or condition that you feel may need urgent attention  Your doctor recommends these additional instructions:  If any biopsies were taken, your doctors clinic will contact you in 1 to 2   weeks with any results.  - Discharge patient to home (with escort).   - Resume previous diet indefinitely.   - Repeat colonoscopy in 10 years for screening purposes.   - Return to primary care physician as previously scheduled.  For questions, problems or results please call your physician - Jovany Mendiola MD at Work:  [phone].  [facility] can be reached at [phone]     IF A COMPLICATION OR EMERGENCY SITUATION ARISES AND YOU ARE UNABLE TO REACH   YOUR PHYSICIAN - GO DIRECTLY TO THE EMERGENCY ROOM.  Jovany Mendiola MD  5/16/2018 10:03:20 AM  This report has been verified and signed electronically.

## 2018-05-17 NOTE — ANESTHESIA POSTPROCEDURE EVALUATION
"Anesthesia Post Evaluation    Patient: Jaswinder Valle    Procedure(s) Performed: Procedure(s) (LRB):  COLONOSCOPY (N/A)    Final Anesthesia Type: general  Patient location during evaluation: GI PACU  Patient participation: Yes- Able to Participate  Level of consciousness: awake and alert, oriented and awake  Post-procedure vital signs: reviewed and stable  Airway patency: patent  PONV status at discharge: No PONV  Anesthetic complications: no      Cardiovascular status: blood pressure returned to baseline  Respiratory status: unassisted, spontaneous ventilation and room air  Hydration status: euvolemic  Follow-up not needed.        Visit Vitals  /70 (BP Location: Left arm, Patient Position: Lying)   Pulse 68   Temp 36.6 °C (97.9 °F) (Oral)   Resp 18   Ht 5' 10" (1.778 m)   Wt 88.5 kg (195 lb)   SpO2 98%   BMI 27.98 kg/m²       Pain/Lisette Score: Pain Assessment Performed: Yes (5/16/2018 10:36 AM)  Presence of Pain: denies (5/16/2018 10:36 AM)  Lisette Score: 10 (5/16/2018 10:36 AM)      "

## 2018-09-13 ENCOUNTER — OFFICE VISIT (OUTPATIENT)
Dept: FAMILY MEDICINE | Facility: CLINIC | Age: 48
End: 2018-09-13
Payer: COMMERCIAL

## 2018-09-13 VITALS
SYSTOLIC BLOOD PRESSURE: 112 MMHG | TEMPERATURE: 98 F | HEIGHT: 70 IN | WEIGHT: 193.88 LBS | OXYGEN SATURATION: 99 % | HEART RATE: 81 BPM | BODY MASS INDEX: 27.76 KG/M2 | DIASTOLIC BLOOD PRESSURE: 82 MMHG

## 2018-09-13 DIAGNOSIS — Z30.09 VASECTOMY EVALUATION: ICD-10-CM

## 2018-09-13 DIAGNOSIS — M77.11 LATERAL EPICONDYLITIS OF RIGHT ELBOW: Primary | ICD-10-CM

## 2018-09-13 PROCEDURE — 3008F BODY MASS INDEX DOCD: CPT | Mod: CPTII,S$GLB,, | Performed by: FAMILY MEDICINE

## 2018-09-13 PROCEDURE — 99214 OFFICE O/P EST MOD 30 MIN: CPT | Mod: S$GLB,,, | Performed by: FAMILY MEDICINE

## 2018-09-13 PROCEDURE — 99999 PR PBB SHADOW E&M-EST. PATIENT-LVL III: CPT | Mod: PBBFAC,,, | Performed by: FAMILY MEDICINE

## 2018-09-13 RX ORDER — NAPROXEN 500 MG/1
500 TABLET ORAL 2 TIMES DAILY WITH MEALS
Qty: 30 TABLET | Refills: 0 | Status: SHIPPED | OUTPATIENT
Start: 2018-09-13 | End: 2019-09-05

## 2018-09-13 NOTE — PROGRESS NOTES
Office Visit    Patient Name: Jaswinder Valle    : 1970  MRN: [id number]      Assessment/Plan:  Jaswinder Valle is a 48 y.o. male who presents today for :    Lateral epicondylitis of right elbow  -     naproxen (NAPROSYN) 500 MG tablet; Take 1 tablet (500 mg total) by mouth 2 (two) times daily with meals.  Dispense: 30 tablet; Refill: 0  -activity modification d/w patient: avoid heavy lifting and provocative movements, advised icing and massage  -elbow ROM stretching and strengthening exercises demonstrated in clinic and handouts given to patient  -may use brace for additional support  -RTC in 4-6 weeks if not better, or sooner if pain worsens.    -patient advised to use OTC Tylenol (325mg tablets) once every 4-6 hours as needed for pain - avoid regular use of NSAIDs due to potential GI/BP side effects (may take a short-course and then stop).     Vasectomy evaluation  -     Ambulatory referral to Urology      Follow-up for worsening Sx or as needed.     This note was created by combination of typed  and Dragon dictation.  Transcription errors may be present.  If there are any questions, please contact me.    At least 25 minutes were spent today with the patient in the office, which more than half the time was spent in counseling regarding management as above, as well as answering questions and addressing patient's concerns. Pt voices understanding of what was discussed and has no other questions at this time.      ----------------------------------------------------------------------------------------------------------------------      HPI:  Jaswinder is a 48 y.o. male who presents today for:    Elbow Pain        This patient is new to me     Patient Care Team:  Mary Houston MD as PCP - General (Family Medicine)  This patient has multiple medical diagnoses as noted below.      Patient is here today for R  Elbow Pain  that started a month ago. Denies recent injury/trauma to area. No  slips/falls. He works at the chemical plants, no heavy lifting, but he does go to gym regularly where he states his right elbow pain is worse with curling weight while doing bicep curls. Has tried a short course of ibuprofen for a few days 3 weeks ago, as well as icing, but has not helped.  Pain has a sharp quality that is worse with flexing the R forearm. Resting makes the pain better  Pt is still able to perform daily routine  No tingling/numbness/weakness.  Denies pain radiation    Patient aslo request referral to specialist to discuss getting a vasectomy.      Additional ROS    No CP/SOB/palpitations/swelling  No cough/wheezing/SOB  No nausea/vomiting/abd pain  No rash, no history of allergies to any specific substances    Patient Active Problem List   Diagnosis    Diabetes type 2, uncontrolled    Diabetes mellitus type 2 without retinopathy    Nuclear sclerosis of both eyes    Hyperopia with presbyopia of both eyes    Colon cancer screening       Current Medications  Medications reviewed/updated.     Current Outpatient Medications on File Prior to Visit   Medication Sig Dispense Refill    polyethylene glycol (COLYTE) 240-22.72-6.72 -5.84 gram SolR Take 4,000 mLs (4 L total) by mouth as needed. 1 Bottle 0    polyethylene glycol (COLYTE) 240-22.72-6.72 -5.84 gram SolR Take 4,000 mLs (4 L total) by mouth as needed. 1 Bottle 0     No current facility-administered medications on file prior to visit.        Past Surgical History:   Procedure Laterality Date    COLONOSCOPY N/A 5/16/2018    Procedure: COLONOSCOPY;  Surgeon: Jovany Mendiola MD;  Location: [facility];  Service: Endoscopy;  Laterality: N/A;    COLONOSCOPY N/A 5/16/2018    Performed by Jovany Mendiola MD at [facility]       Family History   Problem Relation Age of Onset    Cataracts Mother     Diabetes Mother     No Known Problems Father     No Known Problems Sister     No Known Problems Brother     No Known Problems Maternal Aunt      "No Known Problems Maternal Uncle     No Known Problems Paternal Aunt     No Known Problems Paternal Uncle     No Known Problems Maternal Grandmother     No Known Problems Maternal Grandfather     No Known Problems Paternal Grandmother     No Known Problems Paternal Grandfather     Amblyopia Neg Hx     Blindness Neg Hx     Cancer Neg Hx     Hypertension Neg Hx     Macular degeneration Neg Hx     Retinal detachment Neg Hx     Strabismus Neg Hx     Stroke Neg Hx     Thyroid disease Neg Hx     Glaucoma Neg Hx        Social History     Socioeconomic History    Marital status:      Spouse name: Not on file    Number of children: Not on file    Years of education: Not on file    Highest education level: Not on file   Social Needs    Financial resource strain: Not on file    Food insecurity - worry: Not on file    Food insecurity - inability: Not on file    Transportation needs - medical: Not on file    Transportation needs - non-medical: Not on file   Occupational History    Not on file   Tobacco Use    Smoking status: Never Smoker    Smokeless tobacco: Never Used   Substance and Sexual Activity    Alcohol use: Yes    Drug use: No    Sexual activity: Yes     Partners: Female   Other Topics Concern    Not on file   Social History Narrative    Not on file           Allergies   Review of patient's allergies indicates:   Allergen Reactions    No known drug allergies              Review of Systems  See HPI      Physical Exam  /82   Pulse 81   Temp 97.6 °F (36.4 °C) (Oral)   Ht 5' 10" (1.778 m)   Wt 87.9 kg (193 lb 14.3 oz)   SpO2 99%   BMI 27.82 kg/m²     GEN: NAD, well developed, pleasant, well nourished  HEENT: NCAT, PERRLA, EOMI, sclera clear, anicteric  PSYCH: AOx3, appropriate mood and affect  MSK: warm/well perfused, normal ROM in all extremities, no c/c/e.  R elbow with FROM, no swelling/redness. +mild TTP over the lateral epicondyle              "

## 2018-09-18 ENCOUNTER — TELEPHONE (OUTPATIENT)
Dept: FAMILY MEDICINE | Facility: CLINIC | Age: 48
End: 2018-09-18

## 2018-09-25 ENCOUNTER — OFFICE VISIT (OUTPATIENT)
Dept: UROLOGY | Facility: CLINIC | Age: 48
End: 2018-09-25
Payer: COMMERCIAL

## 2018-09-25 VITALS
HEIGHT: 70 IN | HEART RATE: 72 BPM | SYSTOLIC BLOOD PRESSURE: 145 MMHG | WEIGHT: 198.19 LBS | DIASTOLIC BLOOD PRESSURE: 81 MMHG | BODY MASS INDEX: 28.37 KG/M2

## 2018-09-25 DIAGNOSIS — Z30.09 VASECTOMY EVALUATION: Primary | ICD-10-CM

## 2018-09-25 PROCEDURE — 3008F BODY MASS INDEX DOCD: CPT | Mod: CPTII,S$GLB,, | Performed by: UROLOGY

## 2018-09-25 PROCEDURE — 99999 PR PBB SHADOW E&M-EST. PATIENT-LVL III: CPT | Mod: PBBFAC,,, | Performed by: UROLOGY

## 2018-09-25 PROCEDURE — 99204 OFFICE O/P NEW MOD 45 MIN: CPT | Mod: S$GLB,,, | Performed by: UROLOGY

## 2018-09-25 RX ORDER — DIAZEPAM 10 MG/1
TABLET ORAL
Qty: 2 TABLET | Refills: 0 | Status: SHIPPED | OUTPATIENT
Start: 2018-09-25 | End: 2019-09-05

## 2018-09-25 NOTE — LETTER
September 25, 2018      Sebastian Colon MD  [address]  [place] [address]           [facility]  [address] [place] [address]  Phone: [phone]  Fax: [phone]          Patient: Jaswinder Valle   MR Number: [id number]   YOB: 1970   Date of Visit: 9/25/2018       Dear Dr. Sebastian Colon:    Thank you for referring Jaswinder Valle to me for evaluation. Attached you will find relevant portions of my assessment and plan of care.    If you have questions, please do not hesitate to call me. I look forward to following Jaswinder Valle along with you.    Sincerely,    Sunil Jackson Jr., MD    Enclosure  CC:  No Recipients    If you would like to receive this communication electronically, please contact [email or web address] or [phone] to request more information on [employer] access.    For providers and/or their staff who would like to refer a patient to [facility], please contact us through our one-stop-shop provider referral line, [facility], at [phone].    If you feel you have received this communication in error or would no longer like to receive these types of communications, please e-mail [email or web address]

## 2018-09-25 NOTE — PROGRESS NOTES
Subjective:       Patient ID: Jaswinder Valle is a 48 y.o. male.    Chief Complaint: Sterilization    HPI patient is here for vasectomy .  He has 2 daughters.  He desires sterilization.  Past Medical History:   Diagnosis Date    Diabetes mellitus type 2 without retinopathy 6/30/2016    Diabetes mellitus, type 2     Nuclear sclerosis of both eyes 6/30/2016       Past Surgical History:   Procedure Laterality Date    COLONOSCOPY N/A 5/16/2018    Procedure: COLONOSCOPY;  Surgeon: Jovany Mendiola MD;  Location: [facility];  Service: Endoscopy;  Laterality: N/A;    COLONOSCOPY N/A 5/16/2018    Performed by Jovany Mend[facility]ola MD at [facility]       Family History   Problem Relation Age of Onset    Cataracts Mother     Diabetes Mother     No Known Problems Father     No Known Problems Sister     No Known Problems Brother     No Known Problems Maternal Aunt     No Known Problems Maternal Uncle     No Known Problems Paternal Aunt     No Known Problems Paternal Uncle     No Known Problems Maternal Grandmother     No Known Problems Maternal Grandfather     No Known Problems Paternal Grandmother     No Known Problems Paternal Grandfather     Amblyopia Neg Hx     Blindness Neg Hx     Cancer Neg Hx     Hypertension Neg Hx     Macular degeneration Neg Hx     Retinal detachment Neg Hx     Strabismus Neg Hx     Stroke Neg Hx     Thyroid disease Neg Hx     Glaucoma Neg Hx        Social History     Socioeconomic History    Marital status:      Spouse name: Not on file    Number of children: Not on file    Years of education: Not on file    Highest education level: Not on file   Social Needs    Financial resource strain: Not on file    Food insecurity - worry: Not on file    Food insecurity - inability: Not on file    Transportation needs - medical: Not on file    Transportation needs - non-medical: Not on file   Occupational History    Not on file   Tobacco Use    Smoking status: Never  Smoker    Smokeless tobacco: Never Used   Substance and Sexual Activity    Alcohol use: Yes    Drug use: No    Sexual activity: Yes     Partners: Female   Other Topics Concern    Not on file   Social History Narrative    Not on file       Allergies:  No known drug allergies    Medications:    Current Outpatient Medications:     naproxen (NAPROSYN) 500 MG tablet, Take 1 tablet (500 mg total) by mouth 2 (two) times daily with meals., Disp: 30 tablet, Rfl: 0    diazePAM (VALIUM) 10 MG Tab, Take 2 pills 1 hour before procedure, Disp: 2 tablet, Rfl: 0    Review of Systems   Constitutional: Negative for activity change, appetite change, chills, diaphoresis, fatigue, fever and unexpected weight change.   HENT: Negative for congestion, dental problem, hearing loss, mouth sores, postnasal drip, rhinorrhea, sinus pressure and trouble swallowing.    Eyes: Negative for pain, discharge and itching.   Respiratory: Negative for apnea, cough, choking, chest tightness, shortness of breath and wheezing.    Cardiovascular: Negative for chest pain, palpitations and leg swelling.   Gastrointestinal: Negative for abdominal distention, abdominal pain, anal bleeding, blood in stool, constipation, diarrhea, nausea, rectal pain and vomiting.   Endocrine: Negative for polydipsia and polyuria.   Genitourinary: Negative for decreased urine volume, difficulty urinating, discharge, dysuria, enuresis, flank pain, frequency, genital sores, hematuria, penile pain, penile swelling, scrotal swelling, testicular pain and urgency.   Musculoskeletal: Negative for arthralgias, back pain and myalgias.   Skin: Negative for color change, rash and wound.   Neurological: Negative for dizziness, syncope, speech difficulty, light-headedness and headaches.   Hematological: Negative for adenopathy. Does not bruise/bleed easily.   Psychiatric/Behavioral: Negative for behavioral problems, confusion, hallucinations and sleep disturbance.       Objective:       Physical Exam   Constitutional: He appears well-developed.   HENT:   Head: Normocephalic.   Cardiovascular: Normal rate.    Pulmonary/Chest: Effort normal.   Abdominal: Soft.   Genitourinary:   Genitourinary Comments: Bilateral Vasa are palpable   Neurological: He is alert.   Skin: Skin is warm.     Psychiatric: He has a normal mood and affect.       Assessment:       1. Vasectomy evaluation        Plan:       Jaswinder was seen today for sterilization.    Diagnoses and all orders for this visit:    Vasectomy evaluation    Other orders  -     diazePAM (VALIUM) 10 MG Tab; Take 2 pills 1 hour before procedure    The risks and benefits of vasectomy were discussed with the patient in detail.  The risks include bleeding, infection, pain, scarring, chronic pain, sperm granuloma, recannulization and loss of testicular function.  The patient was informed that the long term effects of vasectomy are unknown and could be associated with a higher risk of prostate cancer.  He will make sure he is off all blood thinners 1 week prior.  Consent was obtained.  The patient will be scheduled for vasectomy.   A prescription was written for Valium and Doxycyline pre-op and Hydrocodone post-vasectomy.

## 2018-10-19 ENCOUNTER — HOSPITAL ENCOUNTER (OUTPATIENT)
Dept: UROLOGY | Facility: HOSPITAL | Age: 48
Discharge: HOME | End: 2018-10-19
Attending: UROLOGY
Payer: COMMERCIAL

## 2018-10-19 VITALS
DIASTOLIC BLOOD PRESSURE: 78 MMHG | HEIGHT: 70 IN | SYSTOLIC BLOOD PRESSURE: 128 MMHG | WEIGHT: 198.63 LBS | BODY MASS INDEX: 28.44 KG/M2 | HEART RATE: 78 BPM | TEMPERATURE: 98 F | RESPIRATION RATE: 18 BRPM

## 2018-10-19 DIAGNOSIS — Z30.09 VASECTOMY EVALUATION: ICD-10-CM

## 2018-10-19 DIAGNOSIS — E11.9 TYPE 2 DIABETES MELLITUS WITHOUT COMPLICATION: ICD-10-CM

## 2018-10-19 PROCEDURE — 55250 REMOVAL OF SPERM DUCT(S): CPT

## 2018-10-19 PROCEDURE — 27200994 HC ELECTROCAUTERY DEVICE

## 2018-10-19 PROCEDURE — 55250 REMOVAL OF SPERM DUCT(S): CPT | Mod: ,,, | Performed by: UROLOGY

## 2018-10-19 RX ORDER — OXYCODONE AND ACETAMINOPHEN 5; 325 MG/1; MG/1
1 TABLET ORAL EVERY 4 HOURS PRN
Qty: 12 TABLET | Refills: 0 | Status: SHIPPED | OUTPATIENT
Start: 2018-10-19 | End: 2019-09-05

## 2018-10-19 RX ORDER — LIDOCAINE HYDROCHLORIDE 20 MG/ML
10 INJECTION, SOLUTION INFILTRATION; PERINEURAL ONCE
Status: COMPLETED | OUTPATIENT
Start: 2018-10-19 | End: 2018-10-19

## 2018-10-19 RX ADMIN — LIDOCAINE HYDROCHLORIDE 10 ML: 20 INJECTION, SOLUTION INFILTRATION; PERINEURAL at 09:10

## 2018-10-19 NOTE — OP NOTE
DATE OF PROCEDURE:  10/19/2018.    SURGEON:  Sunil Jackson M.D.    PREOPERATIVE DIAGNOSIS:  Undesired fertility.    POSTOPERATIVE DIAGNOSIS:  Undesired fertility.    OPERATION PERFORMED:  Bilateral vasectomy.    DESCRIPTION OF PROCEDURE:  The patient was prepped and draped in the supine   position.  The left vas was rotated to the midline.  An incision of 2 mL of 1%   plain Xylocaine was made into the skin, subcutaneous tissue, vas and perivasal   tissue.  A stab wound was made.  The vas was delivered into the field.  A 2.5 cm   segment was excised, doubly ligated, fulgurated, folded back upon itself and   then buried.  A similar procedure was performed on the right side through the   same incision.  A 3-0 chromic was used throughout the procedure and to close the   skin.  The patient tolerated the procedure well.  Blood loss was minimal.    Usual post-vas precautions were given.  The patient will return to clinic in 6   weeks with a post-vas specimen and he will continue using some method of birth   control.  No sex or aspirin for the first week.    DISCHARGE MEDICATIONS:  Include Percocet 5 mg, #12.      JAYJAY/CECILIA  dd: 10/19/2018 09:43:25 (CDT)  td: 10/19/2018 12:06:05 (CDT)  Doc ID   #[id number]  Job ID #[id number]    CC:

## 2018-10-19 NOTE — PATIENT INSTRUCTIONS
What to Expect After a Vasectomy  You cannot drive or operate heavy machinery on the day of the procedure.    Apply ice packs to the scrotal area for 24-48 hours. Avoid direct contact of the ice pack with the skin. Scrotal supports, jock straps, or fitted underwear help elevate the scrotum and reduce discomfort.    You may shower the next day. Gently apply soapy water to the scrotum to wash. Rinse and dry yourself by blotting the skin, not rubbing.    Avoid strenuous physical exercises or sexual relations for at least one week after a vasectomy.    Continue to use birth control for at least 6 weeks or 10-20 ejaculations. You are still considered fertile until your urologist examines a post-vasectomy semen analysis at 6 weeks and perhaps one at 8 weeks as well. Drop off the specimen at the 4th floor [facility] between 8am and 4pm.    Do NOT resume unprotected sexual activity until your physician finds no sperm in your semen.    All stitches will dissolve on their own in 1-2 weeks.    Signs and Symptoms to Report  · A large amount of bleeding at the site  · An unusual amount of pain  · A large amount of swelling in the scrotum  · Fever and chills  · Any signs of infection, such as redness at the site or foul-smelling drainage    Risks  The risks of complication after vasectomy are very low. A few of the risks include:  · Bleeding  · Infection  · Scrotal hematoma - a collection of blood in the scrotum  · Inflammation of the epididymis - inflammation of a structure next to the testicle that helps in maturation of sperm  · Sperm granuloma - a collection of sperm that leaks out from the vas deferens, forming a small nodule or lump. This does not usually cause any discomfort but you may feel it in the scrotum.  · Recanalization - the restoration of the lumen or transport tube between the two ends of the vas deferens, possibly causing fertility    If you have any questions or concerns, please call your [facility]  urologist at [phone].

## 2018-11-30 DIAGNOSIS — E11.9 TYPE 2 DIABETES MELLITUS WITHOUT COMPLICATION: ICD-10-CM

## 2018-12-15 ENCOUNTER — TELEPHONE (OUTPATIENT)
Dept: FAMILY MEDICINE | Facility: CLINIC | Age: 48
End: 2018-12-15

## 2018-12-15 NOTE — TELEPHONE ENCOUNTER
----- Message from Jennifer Jacobson LPN sent at 12/14/2018  3:15 PM CST -----  Contact: pt: [phone]      ----- Message -----  From: Dereck Soares  Sent: 12/14/2018   1:45 PM  To: Renetta MCCORMACK Jr Staff    Needs Advice    Reason for call: pt stated he had a Vas done and would like to know the amount of the sample he has to bring in, the location the sample has to be brought to and also how soon after he collects the same would he bring it in     Communication Preference: pt: [phone]

## 2018-12-18 ENCOUNTER — OFFICE VISIT (OUTPATIENT)
Dept: OPTOMETRY | Facility: CLINIC | Age: 48
End: 2018-12-18
Payer: COMMERCIAL

## 2018-12-18 DIAGNOSIS — H52.7 REFRACTIVE ERROR: ICD-10-CM

## 2018-12-18 DIAGNOSIS — H25.13 NUCLEAR SCLEROSIS, BILATERAL: ICD-10-CM

## 2018-12-18 DIAGNOSIS — E11.9 TYPE 2 DIABETES MELLITUS WITHOUT RETINOPATHY: Primary | ICD-10-CM

## 2018-12-18 PROCEDURE — 99999 PR PBB SHADOW E&M-EST. PATIENT-LVL II: CPT | Mod: PBBFAC,,, | Performed by: OPTOMETRIST

## 2018-12-18 PROCEDURE — 92014 COMPRE OPH EXAM EST PT 1/>: CPT | Mod: S$GLB,,, | Performed by: OPTOMETRIST

## 2018-12-18 PROCEDURE — 92015 DETERMINE REFRACTIVE STATE: CPT | Mod: S$GLB,,, | Performed by: OPTOMETRIST

## 2018-12-18 NOTE — PROGRESS NOTES
Subjective:       Patient ID: Jaswinder Valle is a 48 y.o. male      Chief Complaint   Patient presents with    Concerns About Ocular Health    Diabetic Eye Exam     History of Present Illness  Dls: 8/7/17 Dr. Mora     47 y/o male presents today for diabetic eye exam.   Pt c/o blurry vision at near ou. Pt wears otc readers.     LBS ?     No tearing  No itching   No burning  No pain  No ha's  No floaters  No flashes    Eye meds  None    Hemoglobin A1C       Date                     Value               Ref Range           Status           04/05/2018               7.3 (H)             4.0 - 5.6 %         Final             09/22/2017               10.0 (H)            4.0 - 5.6 %         Final            06/29/2016               8.0 (H)             4.5 - 6.2 %         Final      ----------     Assessment/Plan:     1. Type 2 diabetes mellitus without retinopathy  No diabetic retinopathy. Discussed with pt the effects of diabetes on vision, importance of good blood sugar control, compliance with meds, and follow up care with PCP. Return in 1 year for dilated eye exam, sooner PRN.    2. Nuclear sclerosis, bilateral  Educated pt on presence of cataracts and effects on vision. No surgery at this time. Recheck in one year.    3. Refractive error  Educated patient on refractive error and discussed lens options. Dispensed updated spectacle Rx. Educated about adaptation period to new specs.    Eyeglass Final Rx     Eyeglass Final Rx       Sphere Cylinder Axis Add    Right +0.75 Sphere  +2.00    Left +0.75 +0.25 030 +2.00    Expiration Date:  12/19/2019                  Follow-up in about 1 year (around 12/18/2019) for Diabetic Eye Exam.

## 2019-08-09 ENCOUNTER — TELEPHONE (OUTPATIENT)
Dept: FAMILY MEDICINE | Facility: CLINIC | Age: 49
End: 2019-08-09

## 2019-08-09 NOTE — TELEPHONE ENCOUNTER
Spoke with patient and he scheduled his lab for 09/04/19 and his appointment with you for 09/05/19.

## 2019-08-09 NOTE — TELEPHONE ENCOUNTER
----- Message from Mary Houston MD sent at 8/9/2019  9:35 AM CDT -----  Please contact patient.  He needs to follow with me and complete his blood work.  We need to check on his diabetes.     Dr. Houston

## 2019-09-04 ENCOUNTER — LAB VISIT (OUTPATIENT)
Dept: LAB | Facility: HOSPITAL | Age: 49
End: 2019-09-04
Attending: FAMILY MEDICINE
Payer: COMMERCIAL

## 2019-09-04 DIAGNOSIS — E11.9 TYPE 2 DIABETES MELLITUS WITHOUT COMPLICATION: ICD-10-CM

## 2019-09-04 LAB
ESTIMATED AVG GLUCOSE: 189 MG/DL (ref 68–131)
HBA1C MFR BLD HPLC: 8.2 % (ref 4–5.6)

## 2019-09-04 PROCEDURE — 83036 HEMOGLOBIN GLYCOSYLATED A1C: CPT

## 2019-09-04 PROCEDURE — 36415 COLL VENOUS BLD VENIPUNCTURE: CPT | Mod: PO

## 2019-09-05 ENCOUNTER — OFFICE VISIT (OUTPATIENT)
Dept: FAMILY MEDICINE | Facility: CLINIC | Age: 49
End: 2019-09-05
Payer: COMMERCIAL

## 2019-09-05 VITALS
HEIGHT: 70 IN | HEART RATE: 78 BPM | TEMPERATURE: 98 F | OXYGEN SATURATION: 97 % | WEIGHT: 188.25 LBS | SYSTOLIC BLOOD PRESSURE: 120 MMHG | BODY MASS INDEX: 26.95 KG/M2 | DIASTOLIC BLOOD PRESSURE: 80 MMHG

## 2019-09-05 DIAGNOSIS — Z00.00 ANNUAL PHYSICAL EXAM: Primary | ICD-10-CM

## 2019-09-05 DIAGNOSIS — Z12.5 SCREENING PSA (PROSTATE SPECIFIC ANTIGEN): ICD-10-CM

## 2019-09-05 DIAGNOSIS — E11.9 TYPE 2 DIABETES MELLITUS WITHOUT COMPLICATION, WITHOUT LONG-TERM CURRENT USE OF INSULIN: ICD-10-CM

## 2019-09-05 DIAGNOSIS — Z23 NEED FOR 23-POLYVALENT PNEUMOCOCCAL POLYSACCHARIDE VACCINE: ICD-10-CM

## 2019-09-05 DIAGNOSIS — E11.9 DIABETES MELLITUS TYPE 2 WITHOUT RETINOPATHY: ICD-10-CM

## 2019-09-05 PROCEDURE — 99396 PR PREVENTIVE VISIT,EST,40-64: ICD-10-PCS | Mod: 25,S$GLB,, | Performed by: FAMILY MEDICINE

## 2019-09-05 PROCEDURE — 99396 PREV VISIT EST AGE 40-64: CPT | Mod: 25,S$GLB,, | Performed by: FAMILY MEDICINE

## 2019-09-05 PROCEDURE — 3045F PR MOST RECENT HEMOGLOBIN A1C LEVEL 7.0-9.0%: CPT | Mod: CPTII,S$GLB,, | Performed by: FAMILY MEDICINE

## 2019-09-05 PROCEDURE — 99999 PR PBB SHADOW E&M-EST. PATIENT-LVL III: CPT | Mod: PBBFAC,,, | Performed by: FAMILY MEDICINE

## 2019-09-05 PROCEDURE — 99999 PR PBB SHADOW E&M-EST. PATIENT-LVL III: ICD-10-PCS | Mod: PBBFAC,,, | Performed by: FAMILY MEDICINE

## 2019-09-05 PROCEDURE — 3045F PR MOST RECENT HEMOGLOBIN A1C LEVEL 7.0-9.0%: ICD-10-PCS | Mod: CPTII,S$GLB,, | Performed by: FAMILY MEDICINE

## 2019-09-05 PROCEDURE — 90471 PNEUMOCOCCAL POLYSACCHARIDE VACCINE 23-VALENT =>2YO SQ IM: ICD-10-PCS | Mod: S$GLB,,, | Performed by: FAMILY MEDICINE

## 2019-09-05 PROCEDURE — 90732 PPSV23 VACC 2 YRS+ SUBQ/IM: CPT | Mod: S$GLB,,, | Performed by: FAMILY MEDICINE

## 2019-09-05 PROCEDURE — 90471 IMMUNIZATION ADMIN: CPT | Mod: S$GLB,,, | Performed by: FAMILY MEDICINE

## 2019-09-05 PROCEDURE — 90732 PNEUMOCOCCAL POLYSACCHARIDE VACCINE 23-VALENT =>2YO SQ IM: ICD-10-PCS | Mod: S$GLB,,, | Performed by: FAMILY MEDICINE

## 2019-09-05 RX ORDER — METFORMIN HYDROCHLORIDE 500 MG/1
500 TABLET, EXTENDED RELEASE ORAL
Qty: 90 TABLET | Refills: 3 | Status: SHIPPED | OUTPATIENT
Start: 2019-09-05 | End: 2020-11-20

## 2019-09-05 NOTE — PROGRESS NOTES
Assessment & Plan  Problem List Items Addressed This Visit        Endocrine    Diabetes mellitus type 2 without retinopathy    Relevant Medications    metFORMIN (GLUCOPHAGE-XR) 500 MG 24 hr tablet    Other Relevant Orders    Ambulatory referral to Cardiology    Comprehensive metabolic panel    Lipid panel    Type 2 diabetes mellitus without complication, without long-term current use of insulin    Current Assessment & Plan     Restart metformin at this time.  Continue with diet modifications.  Increase exercise.         Relevant Medications    metFORMIN (GLUCOPHAGE-XR) 500 MG 24 hr tablet    Other Relevant Orders    Ambulatory referral to Cardiology    Comprehensive metabolic panel    Lipid panel      Other Visit Diagnoses     Annual physical exam    -  Primary    Relevant Orders    Comprehensive metabolic panel    Lipid panel    Screening PSA (prostate specific antigen)        Relevant Orders    PSA, Screening    Need for 23-polyvalent pneumococcal polysaccharide vaccine        Relevant Orders    (In Office Administered) Pneumococcal Polysaccharide Vaccine (23 Valent) (SQ/IM) (Completed)      I addressed all major concerns as it related to health maintenance.  All were ordered and scheduled based on the patients wishes.  Any additional health maintenance will be readdressed at the next physical if declined or deferred by the patient.        Health Maintenance reviewed.    Follow-up: No follow-ups on file.    ______________________________________________________________________    Chief Complaint  Chief Complaint   Patient presents with    Annual Exam       HPI  Jaswinder Valle is a 49 y.o. male with multiple medical diagnoses as listed in the medical history and problem list that presents for annual exam.  Pt is known to me with last appointment 4/6/2018.    Patient denies any new symptoms including chest pain, SOB, blurry vision, N/V, diarrhea.  Patient presents in the office today for his annual exam.  We  did review his blood work.  He did have an elevation in his hemoglobin A1c.  He is previously on metformin in the past.  We had to discontinue metformin due to elevation in his creatinine.  Patient has tried to monitor his diet       PAST MEDICAL HISTORY:  Past Medical History:   Diagnosis Date    Diabetes mellitus type 2 without retinopathy 6/30/2016    Diabetes mellitus, type 2     Diabetes type 2, uncontrolled 6/29/2016    Nuclear sclerosis of both eyes 6/30/2016       PAST SURGICAL HISTORY:  Past Surgical History:   Procedure Laterality Date    COLONOSCOPY N/A 5/16/2018    Performed by Jovany Mend[facility]ola MD at [facility]       SOCIAL HISTORY:  Social History     Socioeconomic History    Marital status:      Spouse name: Not on file    Number of children: Not on file    Years of education: Not on file    Highest education level: Not on file   Occupational History    Not on file   Social Needs    Financial resource strain: Not on file    Food insecurity:     Worry: Not on file     Inability: Not on file    Transportation needs:     Medical: Not on file     Non-medical: Not on file   Tobacco Use    Smoking status: Never Smoker    Smokeless tobacco: Never Used   Substance and Sexual Activity    Alcohol use: Yes    Drug use: No    Sexual activity: Yes     Partners: Female   Lifestyle    Physical activity:     Days per week: Not on file     Minutes per session: Not on file    Stress: Not on file   Relationships    Social connections:     Talks on phone: Not on file     Gets together: Not on file     Attends [employer] service: Not on file     Active member of club or organization: Not on file     Attends meetings of clubs or organizations: Not on file     Relationship status: Not on file   Other Topics Concern    Not on file   Social History Narrative    Not on file       FAMILY HISTORY:  Family History   Problem Relation Age of Onset    Cataracts Mother     Diabetes Mother     No Known  Problems Father     No Known Problems Sister     No Known Problems Brother     No Known Problems Maternal Aunt     No Known Problems Maternal Uncle     No Known Problems Paternal Aunt     No Known Problems Paternal Uncle     No Known Problems Maternal Grandmother     No Known Problems Maternal Grandfather     No Known Problems Paternal Grandmother     No Known Problems Paternal Grandfather     Amblyopia Neg Hx     Blindness Neg Hx     Cancer Neg Hx     Hypertension Neg Hx     Macular degeneration Neg Hx     Retinal detachment Neg Hx     Strabismus Neg Hx     Stroke Neg Hx     Thyroid disease Neg Hx     Glaucoma Neg Hx        ALLERGIES AND MEDICATIONS: updated and reviewed.  Review of patient's allergies indicates:   Allergen Reactions    No known drug allergies      Current Outpatient Medications   Medication Sig Dispense Refill    metFORMIN (GLUCOPHAGE-XR) 500 MG 24 hr tablet Take 1 tablet (500 mg total) by mouth daily with breakfast. 90 tablet 3     No current facility-administered medications for this visit.          ROS  Review of Systems   Constitutional: Negative for activity change, appetite change, fatigue, fever and unexpected weight change.   HENT: Negative for congestion and facial swelling.    Eyes: Negative for visual disturbance.   Respiratory: Negative for chest tightness, shortness of breath, wheezing and stridor.    Cardiovascular: Negative for chest pain, palpitations and leg swelling.   Gastrointestinal: Negative for abdominal distention, abdominal pain, constipation, diarrhea, nausea and vomiting.   Endocrine: Negative for cold intolerance, heat intolerance, polydipsia and polyuria.   Skin: Negative.    Allergic/Immunologic: Negative.    Neurological: Negative for dizziness, light-headedness, numbness and headaches.   Psychiatric/Behavioral: Negative for agitation and decreased concentration.           Physical Exam  Vitals:    09/05/19 1107   BP: 120/80   BP Location: Left  "arm   Patient Position: Sitting   BP Method: Large (Manual)   Pulse: 78   Temp: 98 °F (36.7 °C)   TempSrc: Oral   SpO2: 97%   Weight: 85.4 kg (188 lb 4.4 oz)   Height: 5' 10" (1.778 m)    Body mass index is 27.01 kg/m².  Weight: 85.4 kg (188 lb 4.4 oz)   Height: 5' 10" (177.8 cm)   Physical Exam   Constitutional: He is oriented to person, place, and time. He appears well-developed and well-nourished.   HENT:   Head: Normocephalic and atraumatic.   Right Ear: External ear normal.   Left Ear: External ear normal.   Nose: Nose normal.   Mouth/Throat: Oropharynx is clear and moist. No oropharyngeal exudate.   Eyes: Pupils are equal, round, and reactive to light. Conjunctivae and EOM are normal.   Neck: Normal range of motion. Neck supple.   Cardiovascular: Normal rate, regular rhythm, normal heart sounds and intact distal pulses. Exam reveals no gallop and no friction rub.   No murmur heard.  Pulmonary/Chest: Effort normal and breath sounds normal.   Abdominal: Soft. Bowel sounds are normal.   Musculoskeletal: Normal range of motion.   Neurological: He is alert and oriented to person, place, and time. He has normal reflexes.   Skin: Skin is warm and dry.   Psychiatric: He has a normal mood and affect. His behavior is normal. Thought content normal.   Vitals reviewed.        Health Maintenance       Date Due Completion Date    Urine Microalbumin 02/23/1978 ---    TETANUS VACCINE 02/23/1986 ---    Shingles Vaccine (1 of 2) 02/23/2018 ---    Lipid Panel 04/05/2019 4/5/2018    Foot Exam 04/06/2019 4/6/2018    Override on 4/6/2018: Done    Influenza Vaccine (1) 09/01/2019 4/6/2018 (Declined)    Override on 4/6/2018: Declined    Eye Exam 12/18/2019 12/18/2018    Override on 12/18/2018: Done    Override on 8/7/2017: Done    Hemoglobin A1c 03/04/2020 9/4/2019    Colonoscopy 05/16/2028 5/16/2018              Patient note was created using [employer].  Any errors in syntax or even information may not have been identified and edited " on initial review prior to signing this note.

## 2019-09-05 NOTE — MEDICAL/APP STUDENT
Subjective:       Patient ID: Jaswinder Valle is a 49 y.o. male.    Chief Complaint: Annual Exam    Jaswinder Valle is a 49 y.o. male with multiple medical diagnoses as listed in the medical history and problem list that presents for Annual Exam.    Pt states he has been exercising less, maybe 1/wk, because he is tired from work. Pt states his diet is good, mostly fruits, veggies, nuts, chicken and fish. He is willing to start checking sugars, has materials at home.    Pt wants to see a Cardiologist, concerned that his co-workers and friends are having heart attacks and he wants to catch it early. He denies any sx.    Review of Systems   Constitutional: Negative for chills and fever.   Respiratory: Negative for cough and shortness of breath.    Cardiovascular: Negative for chest pain.   Gastrointestinal: Negative for abdominal pain, nausea and vomiting.   Genitourinary: Negative for difficulty urinating, dysuria and hematuria.   Musculoskeletal: Negative for arthralgias and myalgias.   Skin: Negative for rash and wound.   Neurological: Negative for syncope and numbness.       Past Medical History:   Diagnosis Date    Diabetes mellitus type 2 without retinopathy 6/30/2016    Diabetes mellitus, type 2     Nuclear sclerosis of both eyes 6/30/2016     Past Surgical History:   Procedure Laterality Date    COLONOSCOPY N/A 5/16/2018    Performed by J[facility]ola MD at [facility] END[facility]     Social History     Socioeconomic History    Marital status:      Spouse name: Not on file    Number of children: Not on file    Years of education: Not on file    Highest education level: Not on file   Occupational History    Not on file   Social Needs    Financial resource strain: Not on file    Food insecurity:     Worry: Not on file     Inability: Not on file    Transportation needs:     Medical: Not on file     Non-medical: Not on file   Tobacco Use    Smoking status: Never Smoker    Smokeless tobacco: Never Used    Substance and Sexual Activity    Alcohol use: Yes    Drug use: No    Sexual activity: Yes     Partners: Female   Lifestyle    Physical activity:     Days per week: Not on file     Minutes per session: Not on file    Stress: Not on file   Relationships    Social connections:     Talks on phone: Not on file     Gets together: Not on file     Attends Religion service: Not on file     Active member of club or organization: Not on file     Attends meetings of clubs or organizations: Not on file     Relationship status: Not on file   Other Topics Concern    Not on file   Social History Narrative    Not on file     Current Outpatient Medications on File Prior to Visit   Medication Sig Dispense Refill    [DISCONTINUED] diazePAM (VALIUM) 10 MG Tab Take 2 pills 1 hour before procedure 2 tablet 0    [DISCONTINUED] naproxen (NAPROSYN) 500 MG tablet Take 1 tablet (500 mg total) by mouth 2 (two) times daily with meals. 30 tablet 0    [DISCONTINUED] oxyCODONE-acetaminophen (PERCOCET) 5-325 mg per tablet Take 1 tablet by mouth every 4 (four) hours as needed for Pain. 12 tablet 0     No current facility-administered medications on file prior to visit.      Review of patient's allergies indicates:   Allergen Reactions    No known drug allergies      Objective:      Physical Exam   Constitutional: He is oriented to person, place, and time. He appears well-developed and well-nourished.   HENT:   Head: Normocephalic and atraumatic.   Right Ear: External ear normal.   Left Ear: External ear normal.   Eyes: Conjunctivae and EOM are normal.   Neck: Neck supple.   Cardiovascular: Normal rate, regular rhythm and normal heart sounds.   Pulmonary/Chest: Effort normal and breath sounds normal. No respiratory distress.   Abdominal: Soft. He exhibits no distension.   Musculoskeletal: He exhibits no edema or deformity.   Neurological: He is alert and oriented to person, place, and time.   Skin: Skin is warm and dry.   Psychiatric:  His behavior is normal.   Nursing note and vitals reviewed.      Assessment:       1. Annual physical exam    2. Diabetes mellitus type 2 without retinopathy    3. Screening PSA (prostate specific antigen)    4. Need for 23-polyvalent pneumococcal polysaccharide vaccine        Plan:       Annual physical exam  Discussed importance of exercise, continue diet. Health Maintenance reviewed and addressed per orders. Will refer to Cardiology by pt request.    Diabetes mellitus type 2 without retinopathy  A1c increased to 8.2, will start on metformin, advise exercise, continue diet. Advised pt to start checking BG in the mornings, he is amenable to this plan. Will f/u A1c in 3 months.    Screening for PSA  Given family hx of prostate CA, will check PSA annually.

## 2019-09-10 PROBLEM — E11.9 TYPE 2 DIABETES MELLITUS WITHOUT COMPLICATION, WITHOUT LONG-TERM CURRENT USE OF INSULIN: Status: ACTIVE | Noted: 2019-09-10

## 2019-09-12 ENCOUNTER — PATIENT OUTREACH (OUTPATIENT)
Dept: ADMINISTRATIVE | Facility: OTHER | Age: 49
End: 2019-09-12

## 2019-09-16 ENCOUNTER — OFFICE VISIT (OUTPATIENT)
Dept: CARDIOLOGY | Facility: CLINIC | Age: 49
End: 2019-09-16
Payer: COMMERCIAL

## 2019-09-16 VITALS — SYSTOLIC BLOOD PRESSURE: 146 MMHG | OXYGEN SATURATION: 98 % | HEART RATE: 82 BPM | DIASTOLIC BLOOD PRESSURE: 89 MMHG

## 2019-09-16 DIAGNOSIS — Z76.89 ESTABLISHING CARE WITH NEW DOCTOR, ENCOUNTER FOR: ICD-10-CM

## 2019-09-16 DIAGNOSIS — E11.9 DIABETES MELLITUS WITHOUT COMPLICATION: Primary | ICD-10-CM

## 2019-09-16 PROCEDURE — 93000 EKG 12-LEAD: ICD-10-PCS | Mod: S$GLB,,, | Performed by: INTERNAL MEDICINE

## 2019-09-16 PROCEDURE — 3045F PR MOST RECENT HEMOGLOBIN A1C LEVEL 7.0-9.0%: ICD-10-PCS | Mod: CPTII,S$GLB,, | Performed by: INTERNAL MEDICINE

## 2019-09-16 PROCEDURE — 99204 OFFICE O/P NEW MOD 45 MIN: CPT | Mod: S$GLB,,, | Performed by: INTERNAL MEDICINE

## 2019-09-16 PROCEDURE — 93000 ELECTROCARDIOGRAM COMPLETE: CPT | Mod: S$GLB,,, | Performed by: INTERNAL MEDICINE

## 2019-09-16 PROCEDURE — 99999 PR PBB SHADOW E&M-EST. PATIENT-LVL III: CPT | Mod: PBBFAC,,, | Performed by: INTERNAL MEDICINE

## 2019-09-16 PROCEDURE — 99999 PR PBB SHADOW E&M-EST. PATIENT-LVL III: ICD-10-PCS | Mod: PBBFAC,,, | Performed by: INTERNAL MEDICINE

## 2019-09-16 PROCEDURE — 3045F PR MOST RECENT HEMOGLOBIN A1C LEVEL 7.0-9.0%: CPT | Mod: CPTII,S$GLB,, | Performed by: INTERNAL MEDICINE

## 2019-09-16 PROCEDURE — 99204 PR OFFICE/OUTPT VISIT, NEW, LEVL IV, 45-59 MIN: ICD-10-PCS | Mod: S$GLB,,, | Performed by: INTERNAL MEDICINE

## 2019-09-16 RX ORDER — ATORVASTATIN CALCIUM 40 MG/1
40 TABLET, FILM COATED ORAL NIGHTLY
Qty: 90 TABLET | Refills: 3 | Status: SHIPPED | OUTPATIENT
Start: 2019-09-16 | End: 2021-01-29

## 2019-09-16 NOTE — LETTER
September 17, 2019      Mary Houston MD  [address]  [place] [address]           [facility]  [address] [place]  [place] [address]  Phone: [phone]          Patient: Jaswinder Valle   MR Number: [id number]   YOB: 1970   Date of Visit: 9/16/2019       Dear Dr. Mary Houston:    Thank you for referring Jaswinder Valle to me for evaluation. Attached you will find relevant portions of my assessment and plan of care.    If you have questions, please do not hesitate to call me. I look forward to following Jaswinder Valle along with you.    Sincerely,    Km Patel MD    Enclosure  CC:  No Recipients    If you would like to receive this communication electronically, please contact [email or web address] or [phone] to request more information on [employer] [place] Link access.    For providers and/or their staff who would like to refer a patient to [facility], please contact us through our one-stop-shop provider referral line, [facility], at [phone].    If you feel you have received this communication in error or would no longer like to receive these types of communications, please e-mail [email or web address]

## 2019-09-16 NOTE — PROGRESS NOTES
CARDIOVASCULAR CONSULTATION    REASON FOR CONSULT:   Jaswinder Valle is a 49 y.o. male who presents for cardiovascular risk assessment      HISTORY OF PRESENT ILLNESS:     Patient is a pleasant 49-year-old man who was recently diagnosed with diabetes.  States that he has a very active person and denies any chest pains at rest on exertion, orthopnea, PND.  States that he has to climb multiple flights of stair at has never had any issues with that.  Wants to optimize his cardiovascular risk      PAST MEDICAL HISTORY:     Past Medical History:   Diagnosis Date    Diabetes mellitus type 2 without retinopathy 6/30/2016    Diabetes mellitus, type 2     Diabetes type 2, uncontrolled 6/29/2016    Nuclear sclerosis of both eyes 6/30/2016       PAST SURGICAL HISTORY:     Past Surgical History:   Procedure Laterality Date    COLONOSCOPY N/A 5/16/2018    Performed by Jovany Mendiola MD at [facility]       ALLERGIES AND MEDICATION:     Review of patient's allergies indicates:   Allergen Reactions    No known drug allergies         Medication List           Accurate as of 9/16/19  3:53 PM. If you have any questions, ask your nurse or doctor.               CONTINUE taking these medications    metFORMIN 500 MG 24 hr tablet  Commonly known as:  GLUCOPHAGE-XR  Take 1 tablet (500 mg total) by mouth daily with breakfast.            SOCIAL HISTORY:     Social History     Socioeconomic History    Marital status:      Spouse name: Not on file    Number of children: Not on file    Years of education: Not on file    Highest education level: Not on file   Occupational History    Not on file   Social Needs    Financial resource strain: Not on file    Food insecurity:     Worry: Not on file     Inability: Not on file    Transportation needs:     Medical: Not on file     Non-medical: Not on file   Tobacco Use    Smoking status: Never Smoker    Smokeless tobacco: Never Used   Substance and Sexual Activity    Alcohol  use: Yes    Drug use: No    Sexual activity: Yes     Partners: Female   Lifestyle    Physical activity:     Days per week: Not on file     Minutes per session: Not on file    Stress: Not on file   Relationships    Social connections:     Talks on phone: Not on file     Gets together: Not on file     Attends Hindu service: Not on file     Active member of club or organization: Not on file     Attends meetings of clubs or organizations: Not on file     Relationship status: Not on file   Other Topics Concern    Not on file   Social History Narrative    Not on file       FAMILY HISTORY:     Family History   Problem Relation Age of Onset    Cataracts Mother     Diabetes Mother     No Known Problems Father     No Known Problems Sister     No Known Problems Brother     No Known Problems Maternal Aunt     No Known Problems Maternal Uncle     No Known Problems Paternal Aunt     No Known Problems Paternal Uncle     No Known Problems Maternal Grandmother     No Known Problems Maternal Grandfather     No Known Problems Paternal Grandmother     No Known Problems Paternal Grandfather     Amblyopia Neg Hx     Blindness Neg Hx     Cancer Neg Hx     Hypertension Neg Hx     Macular degeneration Neg Hx     Retinal detachment Neg Hx     Strabismus Neg Hx     Stroke Neg Hx     Thyroid disease Neg Hx     Glaucoma Neg Hx        REVIEW OF SYSTEMS:   Review of Systems   Constitutional: Negative.    HENT: Negative.    Eyes: Negative.    Respiratory: Negative.    Cardiovascular: Negative.    Gastrointestinal: Negative.    Genitourinary: Negative.    Musculoskeletal: Negative.    Skin: Negative.    Neurological: Negative.    Endo/Heme/Allergies: Negative.    Psychiatric/Behavioral: Negative.        A 10 point review of systems was performed and all the pertinent positives have been mentioned. Rest of review of systems was negative.        PHYSICAL EXAM:     Vitals:    09/16/19 1544   BP: (!) 146/89   Pulse: 82    Resp: (P) 16    Body mass index is 27.08 kg/m² (pended).  Weight: (P) 85.6 kg (188 lb 11.4 oz)         Physical Exam   Constitutional: He is oriented to person, place, and time. He appears well-developed and well-nourished. He is active.   HENT:   Head: Normocephalic and atraumatic.   Right Ear: Hearing normal.   Left Ear: Hearing normal.   Nose: Nose normal.   Mouth/Throat: Mucous membranes are normal.   Eyes: Pupils are equal, round, and reactive to light. Conjunctivae and lids are normal.   Neck: Normal range of motion. Neck supple.   Cardiovascular: Normal rate, regular rhythm, normal heart sounds, intact distal pulses and normal pulses.   Pulmonary/Chest: Effort normal and breath sounds normal.   Abdominal: Soft. Normal appearance. There is no tenderness.   Musculoskeletal: He exhibits no edema or deformity.   Neurological: He is alert and oriented to person, place, and time.   Skin: Skin is warm, dry and intact.   Psychiatric: He has a normal mood and affect. His speech is normal.   Nursing note and vitals reviewed.        DATA:     Laboratory:  CBC:  Recent Labs   Lab 09/22/17  0743 04/05/18  0800   WBC 5.63 5.92   Hemoglobin 14.4 14.3   Hematocrit 42.0 43.7   Platelets 160 188       CHEMISTRIES:  Recent Labs   Lab 09/22/17  0743 04/05/18  0800   Glucose 210 H 141 H   Sodium 139 139   Potassium 3.8 3.9   BUN, Bld 18 15   Creatinine 1.3 1.2   eGFR if  >60.0 >60.0   eGFR if non African American >60.0 >60.0   Calcium 8.8 8.9       CARDIAC BIOMARKERS:        COAGS:        LIPIDS/LFTS:  Recent Labs   Lab 09/22/17  0743 04/05/18  0800   Cholesterol 199 198   Triglycerides 123 84   HDL 42 53   LDL Cholesterol 132.4 128.2   Non-HDL Cholesterol 157 145   AST 25 18   ALT 37 22       Hemoglobin A1C   Date Value Ref Range Status   09/04/2019 8.2 (H) 4.0 - 5.6 % Final     Comment:     ADA Screening Guidelines:  5.7-6.4%  Consistent with prediabetes  >or=6.5%  Consistent with diabetes  High levels of  fetal hemoglobin interfere with the HbA1C  assay. Heterozygous hemoglobin variants (HbS, HgC, etc)do  not significantly interfere with this assay.   However, presence of multiple variants may affect accuracy.     04/05/2018 7.3 (H) 4.0 - 5.6 % Final     Comment:     According to ADA guidelines, hemoglobin A1c <7.0% represents  optimal control in non-pregnant diabetic patients. Different  metrics may apply to specific patient populations.   Standards of Medical Care in Diabetes-2016.  For the purpose of screening for the presence of diabetes:  <5.7%     Consistent with the absence of diabetes  5.7-6.4%  Consistent with increasing risk for diabetes   (prediabetes)  >or=6.5%  Consistent with diabetes  Currently, no consensus exists for use of hemoglobin A1c  for diagnosis of diabetes for children.  This Hemoglobin A1c assay has significant interference with fetal   hemoglobin   (HbF). The results are invalid for patients with abnormal amounts of   HbF,   including those with known Hereditary Persistence   of Fetal Hemoglobin. Heterozygous hemoglobin variants (HbAS, HbAC,   HbAD, HbAE, HbA2) do not significantly interfere with this assay;   however, presence of multiple variants in a sample may impact the %   interference.     09/22/2017 10.0 (H) 4.0 - 5.6 % Final     Comment:     According to ADA guidelines, hemoglobin A1c <7.0% represents  optimal control in non-pregnant diabetic patients. Different  metrics may apply to specific patient populations.   Standards of Medical Care in Diabetes-2016.  For the purpose of screening for the presence of diabetes:  <5.7%     Consistent with the absence of diabetes  5.7-6.4%  Consistent with increasing risk for diabetes   (prediabetes)  >or=6.5%  Consistent with diabetes  Currently, no consensus exists for use of hemoglobin A1c  for diagnosis of diabetes for children.  This Hemoglobin A1c assay has significant interference with fetal   hemoglobin   (HbF). The results are invalid  for patients with abnormal amounts of   HbF,   including those with known Hereditary Persistence   of Fetal Hemoglobin. Heterozygous hemoglobin variants (HbAS, HbAC,   HbAD, HbAE, HbA2) do not significantly interfere with this assay;   however, presence of multiple variants in a sample may impact the %   interference.         TSH  Recent Labs   Lab 09/22/17  0743 04/05/18  0800   TSH 1.054 2.026       The 10-year ASCVD risk score (Myrtle GALLEGOS Jr., et al., 2013) is: 12.7%    Values used to calculate the score:      Age: 49 years      Sex: Male      Is Non- : Yes      Diabetic: Yes      Tobacco smoker: No      Systolic Blood Pressure: 146 mmHg      Is BP treated: No      HDL Cholesterol: 53 mg/dL      Total Cholesterol: 198 mg/dL           Cardiovascular Testing:          ASSESSMENT AND PLAN     Patient Active Problem List   Diagnosis    Diabetes mellitus type 2 without retinopathy    Nuclear sclerosis, bilateral    Refractive error    Colon cancer screening    Type 2 diabetes mellitus without complication, without long-term current use of insulin       1.  Cardiovascular risk optimization.  ASCVD risk 12.5%.  Considering his diabetes and his ASCVD risk, recommended starting Lipitor.  Had a detailed discussion with the patient regarding risks and benefits of Lipitor.  Check LFTs in 6 weeks.  Check baseline echo.          Thank you very much for involving me in the care of your patient.  Please do not hesitate to contact me if there are any questions.      Km Patel MD, FAC, [facility]  Interventional Cardiologist, [facility].           This note was dictated with the help of speech recognition software.  There might be un-intended errors and/or substitutions.

## 2019-10-02 ENCOUNTER — HOSPITAL ENCOUNTER (OUTPATIENT)
Dept: CARDIOLOGY | Facility: HOSPITAL | Age: 49
Discharge: HOME | End: 2019-10-02
Attending: INTERNAL MEDICINE
Payer: COMMERCIAL

## 2019-10-02 DIAGNOSIS — E11.9 DIABETES MELLITUS WITHOUT COMPLICATION: ICD-10-CM

## 2019-10-02 PROCEDURE — 93306 TTE W/DOPPLER COMPLETE: CPT | Mod: 26,,, | Performed by: INTERNAL MEDICINE

## 2019-10-02 PROCEDURE — 93306 ECHO (CUPID ONLY): ICD-10-PCS | Mod: 26,,, | Performed by: INTERNAL MEDICINE

## 2019-10-02 PROCEDURE — 93306 TTE W/DOPPLER COMPLETE: CPT

## 2019-10-05 LAB
AORTIC ROOT ANNULUS: 2.73 CM
AORTIC VALVE CUSP SEPERATION: 2.43 CM
ASCENDING AORTA: 2.81 CM
AV INDEX (PROSTH): 0.88
AV MEAN GRADIENT: 3 MMHG
AV PEAK GRADIENT: 6 MMHG
AV VALVE AREA: 3.21 CM2
AV VELOCITY RATIO: 0.84
CV ECHO LV RWT: 0.51 CM
DOP CALC AO PEAK VEL: 1.25 M/S
DOP CALC AO VTI: 20.65 CM
DOP CALC LVOT AREA: 3.7 CM2
DOP CALC LVOT DIAMETER: 2.16 CM
DOP CALC LVOT PEAK VEL: 1.05 M/S
DOP CALC LVOT STROKE VOLUME: 66.36 CM3
DOP CALCLVOT PEAK VEL VTI: 18.12 CM
E WAVE DECELERATION TIME: 160.97 MSEC
E/A RATIO: 0.96
E/E' RATIO: 6.48 M/S
ECHO LV POSTERIOR WALL: 1.25 CM (ref 0.6–1.1)
FRACTIONAL SHORTENING: 36 % (ref 28–44)
INTERVENTRICULAR SEPTUM: 1.23 CM (ref 0.6–1.1)
IVRT: 0.11 MSEC
LA MAJOR: 4.25 CM
LA MINOR: 3.99 CM
LA WIDTH: 3.41 CM
LEFT ATRIUM SIZE: 3.43 CM
LEFT ATRIUM VOLUME: 40.92 CM3
LEFT INTERNAL DIMENSION IN SYSTOLE: 3.17 CM (ref 2.1–4)
LEFT VENTRICLE DIASTOLIC VOLUME: 114.32 ML
LEFT VENTRICLE SYSTOLIC VOLUME: 40.18 ML
LEFT VENTRICULAR INTERNAL DIMENSION IN DIASTOLE: 4.93 CM (ref 3.5–6)
LEFT VENTRICULAR MASS: 239.42 G
LV LATERAL E/E' RATIO: 5.23 M/S
LV SEPTAL E/E' RATIO: 8.5 M/S
MV PEAK A VEL: 0.71 M/S
MV PEAK E VEL: 0.68 M/S
PISA TR MAX VEL: 1.95 M/S
PULM VEIN S/D RATIO: 1.09
PV PEAK D VEL: 0.66 M/S
PV PEAK S VEL: 0.72 M/S
PV PEAK VELOCITY: 1.02 CM/S
RA MAJOR: 4.71 CM
RA PRESSURE: 3 MMHG
RA WIDTH: 2.72 CM
RIGHT VENTRICULAR END-DIASTOLIC DIMENSION: 3.09 CM
RV TISSUE DOPPLER FREE WALL SYSTOLIC VELOCITY 1 (APICAL 4 CHAMBER VIEW): 12.7 CM/S
SINUS: 3.09 CM
STJ: 2.72 CM
TDI LATERAL: 0.13 M/S
TDI SEPTAL: 0.08 M/S
TDI: 0.11 M/S
TR MAX PG: 15 MMHG
TRICUSPID ANNULAR PLANE SYSTOLIC EXCURSION: 2.29 CM
TV REST PULMONARY ARTERY PRESSURE: 18 MMHG

## 2019-10-25 ENCOUNTER — PATIENT OUTREACH (OUTPATIENT)
Dept: ADMINISTRATIVE | Facility: OTHER | Age: 49
End: 2019-10-25

## 2019-10-29 ENCOUNTER — OFFICE VISIT (OUTPATIENT)
Dept: CARDIOLOGY | Facility: CLINIC | Age: 49
End: 2019-10-29
Payer: COMMERCIAL

## 2019-10-29 VITALS
HEART RATE: 90 BPM | DIASTOLIC BLOOD PRESSURE: 77 MMHG | SYSTOLIC BLOOD PRESSURE: 129 MMHG | WEIGHT: 190.5 LBS | RESPIRATION RATE: 16 BRPM | OXYGEN SATURATION: 99 % | HEIGHT: 70 IN | BODY MASS INDEX: 27.27 KG/M2

## 2019-10-29 DIAGNOSIS — E78.9 LIPID DISORDER: Primary | ICD-10-CM

## 2019-10-29 PROCEDURE — 3008F BODY MASS INDEX DOCD: CPT | Mod: CPTII,S$GLB,, | Performed by: INTERNAL MEDICINE

## 2019-10-29 PROCEDURE — 3008F PR BODY MASS INDEX (BMI) DOCUMENTED: ICD-10-PCS | Mod: CPTII,S$GLB,, | Performed by: INTERNAL MEDICINE

## 2019-10-29 PROCEDURE — 99214 OFFICE O/P EST MOD 30 MIN: CPT | Mod: S$GLB,,, | Performed by: INTERNAL MEDICINE

## 2019-10-29 PROCEDURE — 99999 PR PBB SHADOW E&M-EST. PATIENT-LVL III: CPT | Mod: PBBFAC,,, | Performed by: INTERNAL MEDICINE

## 2019-10-29 PROCEDURE — 99999 PR PBB SHADOW E&M-EST. PATIENT-LVL III: ICD-10-PCS | Mod: PBBFAC,,, | Performed by: INTERNAL MEDICINE

## 2019-10-29 PROCEDURE — 99214 PR OFFICE/OUTPT VISIT, EST, LEVL IV, 30-39 MIN: ICD-10-PCS | Mod: S$GLB,,, | Performed by: INTERNAL MEDICINE

## 2019-10-29 NOTE — PROGRESS NOTES
CARDIOVASCULAR CONSULTATION    REASON FOR CONSULT:   Jaswinder Valle is a 49 y.o. male who presents for cardiovascular risk assessment      HISTORY OF PRESENT ILLNESS:     Patient is a pleasant 49-year-old man who was recently diagnosed with diabetes.  States that he has a very active person and denies any chest pains at rest on exertion, orthopnea, PND.  States that he has to climb multiple flights of stair at has never had any issues with that.  Wants to optimize his cardiovascular risk    Notes from October 2019:  Patient here for follow-up today.  States that he does not want to take Lipitor and never even started..  He wants to changes diet and exercise and lifestyle patterns and see how that affects his diabetes and his lipid profile.  We spent a considerable amount of time talking about the data behind diet and exercise as well as Lipitor and diabetes.  Patient states that he understands and would try lifestyle modifications and agreed for rechecking lipid profile in 6 months.      PAST MEDICAL HISTORY:     Past Medical History:   Diagnosis Date    Diabetes mellitus type 2 without retinopathy 6/30/2016    Diabetes mellitus, type 2     Diabetes type 2, uncontrolled 6/29/2016    Nuclear sclerosis of both eyes 6/30/2016       PAST SURGICAL HISTORY:     Past Surgical History:   Procedure Laterality Date    COLONOSCOPY N/A 5/16/2018    Procedure: COLONOSCOPY;  Surgeon: Jovany Mendiola MD;  Location: [facility];  Service: Endoscopy;  Laterality: N/A;       ALLERGIES AND MEDICATION:     Review of patient's allergies indicates:   Allergen Reactions    No known drug allergies         Medication List           Accurate as of October 29, 2019 11:20 AM. If you have any questions, ask your nurse or doctor.               CONTINUE taking these medications    atorvastatin 40 MG tablet  Commonly known as:  LIPITOR  Take 1 tablet (40 mg total) by mouth every evening.     metFORMIN 500 MG 24 hr tablet  Commonly known  as:  GLUCOPHAGE-XR  Take 1 tablet (500 mg total) by mouth daily with breakfast.            SOCIAL HISTORY:     Social History     Socioeconomic History    Marital status:      Spouse name: Not on file    Number of children: Not on file    Years of education: Not on file    Highest education level: Not on file   Occupational History    Not on file   Social Needs    Financial resource strain: Not on file    Food insecurity:     Worry: Not on file     Inability: Not on file    Transportation needs:     Medical: Not on file     Non-medical: Not on file   Tobacco Use    Smoking status: Never Smoker    Smokeless tobacco: Never Used   Substance and Sexual Activity    Alcohol use: Yes    Drug use: No    Sexual activity: Yes     Partners: Female   Lifestyle    Physical activity:     Days per week: Not on file     Minutes per session: Not on file    Stress: Not on file   Relationships    Social connections:     Talks on phone: Not on file     Gets together: Not on file     Attends Episcopal service: Not on file     Active member of club or organization: Not on file     Attends meetings of clubs or organizations: Not on file     Relationship status: Not on file   Other Topics Concern    Not on file   Social History Narrative    Not on file       FAMILY HISTORY:     Family History   Problem Relation Age of Onset    Cataracts Mother     Diabetes Mother     No Known Problems Father     No Known Problems Sister     No Known Problems Brother     No Known Problems Maternal Aunt     No Known Problems Maternal Uncle     No Known Problems Paternal Aunt     No Known Problems Paternal Uncle     No Known Problems Maternal Grandmother     No Known Problems Maternal Grandfather     No Known Problems Paternal Grandmother     No Known Problems Paternal Grandfather     Amblyopia Neg Hx     Blindness Neg Hx     Cancer Neg Hx     Hypertension Neg Hx     Macular degeneration Neg Hx     Retinal  "detachment Neg Hx     Strabismus Neg Hx     Stroke Neg Hx     Thyroid disease Neg Hx     Glaucoma Neg Hx        REVIEW OF SYSTEMS:   Review of Systems   Constitutional: Negative.    HENT: Negative.    Eyes: Negative.    Respiratory: Negative.    Cardiovascular: Negative.    Gastrointestinal: Negative.    Genitourinary: Negative.    Musculoskeletal: Negative.    Skin: Negative.    Neurological: Negative.    Endo/Heme/Allergies: Negative.    Psychiatric/Behavioral: Negative.        A 10 point review of systems was performed and all the pertinent positives have been mentioned. Rest of review of systems was negative.        PHYSICAL EXAM:     Vitals:    10/29/19 1107   BP: 129/77   Pulse: 90   Resp: 16    Body mass index is 27.33 kg/m².  Weight: 86.4 kg (190 lb 7.6 oz)   Height: 5' 10" (177.8 cm)     Physical Exam   Constitutional: He is oriented to person, place, and time. He appears well-developed and well-nourished. He is active.   HENT:   Head: Normocephalic and atraumatic.   Right Ear: Hearing normal.   Left Ear: Hearing normal.   Nose: Nose normal.   Mouth/Throat: Mucous membranes are normal.   Eyes: Pupils are equal, round, and reactive to light. Conjunctivae and lids are normal.   Neck: Normal range of motion. Neck supple.   Cardiovascular: Normal rate, regular rhythm, normal heart sounds, intact distal pulses and normal pulses.   Pulmonary/Chest: Effort normal and breath sounds normal.   Abdominal: Soft. Normal appearance. There is no tenderness.   Musculoskeletal: He exhibits no edema or deformity.   Neurological: He is alert and oriented to person, place, and time.   Skin: Skin is warm, dry and intact.   Psychiatric: He has a normal mood and affect. His speech is normal.   Nursing note and vitals reviewed.        DATA:     Laboratory:  CBC:  Recent Labs   Lab 09/22/17  0743 04/05/18  0800   WBC 5.63 5.92   Hemoglobin 14.4 14.3   Hematocrit 42.0 43.7   Platelets 160 188       CHEMISTRIES:  Recent Labs "   Lab 09/22/17  0743 04/05/18  0800   Glucose 210 H 141 H   Sodium 139 139   Potassium 3.8 3.9   BUN, Bld 18 15   Creatinine 1.3 1.2   eGFR if  >60.0 >60.0   eGFR if non African American >60.0 >60.0   Calcium 8.8 8.9       CARDIAC BIOMARKERS:        COAGS:        LIPIDS/LFTS:  Recent Labs   Lab 09/22/17  0743 04/05/18  0800 10/02/19  1509   Cholesterol 199 198  --    Triglycerides 123 84  --    HDL 42 53  --    LDL Cholesterol 132.4 128.2  --    Non-HDL Cholesterol 157 145  --    AST 25 18 19   ALT 37 22 25       Hemoglobin A1C   Date Value Ref Range Status   09/04/2019 8.2 (H) 4.0 - 5.6 % Final     Comment:     ADA Screening Guidelines:  5.7-6.4%  Consistent with prediabetes  >or=6.5%  Consistent with diabetes  High levels of fetal hemoglobin interfere with the HbA1C  assay. Heterozygous hemoglobin variants (HbS, HgC, etc)do  not significantly interfere with this assay.   However, presence of multiple variants may affect accuracy.     04/05/2018 7.3 (H) 4.0 - 5.6 % Final     Comment:     According to ADA guidelines, hemoglobin A1c <7.0% represents  optimal control in non-pregnant diabetic patients. Different  metrics may apply to specific patient populations.   Standards of Medical Care in Diabetes-2016.  For the purpose of screening for the presence of diabetes:  <5.7%     Consistent with the absence of diabetes  5.7-6.4%  Consistent with increasing risk for diabetes   (prediabetes)  >or=6.5%  Consistent with diabetes  Currently, no consensus exists for use of hemoglobin A1c  for diagnosis of diabetes for children.  This Hemoglobin A1c assay has significant interference with fetal   hemoglobin   (HbF). The results are invalid for patients with abnormal amounts of   HbF,   including those with known Hereditary Persistence   of Fetal Hemoglobin. Heterozygous hemoglobin variants (HbAS, HbAC,   HbAD, HbAE, HbA2) do not significantly interfere with this assay;   however, presence of multiple variants in  a sample may impact the %   interference.     09/22/2017 10.0 (H) 4.0 - 5.6 % Final     Comment:     According to ADA guidelines, hemoglobin A1c <7.0% represents  optimal control in non-pregnant diabetic patients. Different  metrics may apply to specific patient populations.   Standards of Medical Care in Diabetes-2016.  For the purpose of screening for the presence of diabetes:  <5.7%     Consistent with the absence of diabetes  5.7-6.4%  Consistent with increasing risk for diabetes   (prediabetes)  >or=6.5%  Consistent with diabetes  Currently, no consensus exists for use of hemoglobin A1c  for diagnosis of diabetes for children.  This Hemoglobin A1c assay has significant interference with fetal   hemoglobin   (HbF). The results are invalid for patients with abnormal amounts of   HbF,   including those with known Hereditary Persistence   of Fetal Hemoglobin. Heterozygous hemoglobin variants (HbAS, HbAC,   HbAD, HbAE, HbA2) do not significantly interfere with this assay;   however, presence of multiple variants in a sample may impact the %   interference.         TSH  Recent Labs   Lab 09/22/17  0743 04/05/18  0800   TSH 1.054 2.026       The 10-year ASCVD risk score (Myrtle GALLEGOS Jr., et al., 2013) is: 10.3%    Values used to calculate the score:      Age: 49 years      Sex: Male      Is Non- : Yes      Diabetic: Yes      Tobacco smoker: No      Systolic Blood Pressure: 129 mmHg      Is BP treated: No      HDL Cholesterol: 53 mg/dL      Total Cholesterol: 198 mg/dL           Cardiovascular Testing:          ASSESSMENT AND PLAN     Patient Active Problem List   Diagnosis    Diabetes mellitus type 2 without retinopathy    Nuclear sclerosis, bilateral    Refractive error    Colon cancer screening    Type 2 diabetes mellitus without complication, without long-term current use of insulin       1.  Cardiovascular risk optimization.  ASCVD risk 12.5%.  Considering his diabetes and his ASCVD risk,  recommended starting Lipitor.  Patient states he does not want to take Lipitor or any other statins.  try lifestyle and diet.    Follow-up in 6 months.  Lipid profile in 6 months.      Thank you very much for involving me in the care of your patient.  Please do not hesitate to contact me if there are any questions.      Km Patel MD, FACC, [facility]  Interventional Cardiologist, [facility].           This note was dictated with the help of speech recognition software.  There might be un-intended errors and/or substitutions.

## 2019-11-07 ENCOUNTER — TELEPHONE (OUTPATIENT)
Dept: FAMILY MEDICINE | Facility: CLINIC | Age: 49
End: 2019-11-07

## 2019-11-07 DIAGNOSIS — E11.9 DIABETES MELLITUS TYPE 2 WITHOUT RETINOPATHY: Primary | ICD-10-CM

## 2019-11-07 NOTE — TELEPHONE ENCOUNTER
Below information given to patient, verbalized   understanding. States he will check his schedule and call back to schedule

## 2019-11-07 NOTE — TELEPHONE ENCOUNTER
Please schedule a patient to check his diabetes blood work on December 4th or sometime before the new year ends.

## 2019-11-07 NOTE — TELEPHONE ENCOUNTER
----- Message from Vivian Yepez sent at 11/7/2019  9:59 AM CST -----  Contact: Self   Type:  Patient Returning Call    Who Called:  Self     Who Left Message for Patient:  Elizabeth    Does the patient know what this is regarding?: no     Would the patient rather a call back or a response via [facility]?  Call     Best Call Back Number: [phone]

## 2019-12-20 ENCOUNTER — PATIENT OUTREACH (OUTPATIENT)
Dept: ADMINISTRATIVE | Facility: OTHER | Age: 49
End: 2019-12-20

## 2020-01-31 DIAGNOSIS — E11.9 TYPE 2 DIABETES MELLITUS WITHOUT COMPLICATION: ICD-10-CM

## 2020-02-13 LAB — HBA1C MFR BLD: 8.6 % (ref 4.8–5.6)

## 2020-02-28 ENCOUNTER — PATIENT OUTREACH (OUTPATIENT)
Dept: ADMINISTRATIVE | Facility: OTHER | Age: 50
End: 2020-02-28

## 2020-02-28 NOTE — PROGRESS NOTES
Chart reviewed.   Immunizations: Triggered Imm Registry     Orders placed: n/a  Upcoming appts to satisfy SHARAD topics: eye exam 3/2/2020

## 2020-03-02 ENCOUNTER — OFFICE VISIT (OUTPATIENT)
Dept: OPTOMETRY | Facility: CLINIC | Age: 50
End: 2020-03-02
Payer: COMMERCIAL

## 2020-03-02 DIAGNOSIS — H52.03 HYPEROPIA OF BOTH EYES WITH ASTIGMATISM: ICD-10-CM

## 2020-03-02 DIAGNOSIS — H10.13 ALLERGIC CONJUNCTIVITIS OF BOTH EYES: ICD-10-CM

## 2020-03-02 DIAGNOSIS — E11.9 TYPE 2 DIABETES MELLITUS WITHOUT RETINOPATHY: Primary | ICD-10-CM

## 2020-03-02 DIAGNOSIS — H52.203 HYPEROPIA OF BOTH EYES WITH ASTIGMATISM: ICD-10-CM

## 2020-03-02 LAB
LEFT EYE DM RETINOPATHY: NEGATIVE
RIGHT EYE DM RETINOPATHY: NEGATIVE

## 2020-03-02 PROCEDURE — 99999 PR PBB SHADOW E&M-EST. PATIENT-LVL II: ICD-10-PCS | Mod: PBBFAC,,, | Performed by: OPTOMETRIST

## 2020-03-02 PROCEDURE — 92015 DETERMINE REFRACTIVE STATE: CPT | Mod: S$GLB,,, | Performed by: OPTOMETRIST

## 2020-03-02 PROCEDURE — 92014 COMPRE OPH EXAM EST PT 1/>: CPT | Mod: S$GLB,,, | Performed by: OPTOMETRIST

## 2020-03-02 PROCEDURE — 99999 PR PBB SHADOW E&M-EST. PATIENT-LVL II: CPT | Mod: PBBFAC,,, | Performed by: OPTOMETRIST

## 2020-03-02 PROCEDURE — 92015 PR REFRACTION: ICD-10-PCS | Mod: S$GLB,,, | Performed by: OPTOMETRIST

## 2020-03-02 PROCEDURE — 92014 PR EYE EXAM, EST PATIENT,COMPREHESV: ICD-10-PCS | Mod: S$GLB,,, | Performed by: OPTOMETRIST

## 2020-03-02 NOTE — PROGRESS NOTES
HPI     SHERRI:12/2018  Glasses? Yes   Contacts? no  H/o eye surgery, injections or laser: no  H/o eye injury: no  Known eye conditions? no  Family h/o eye conditions? no  Eye gtts?no    (-) Flashes (-) Floaters (-) Mucous   (-) Tearing (+) Itching (-) Burning   (-) Headaches (-) Eye Pain/discomfort (+) Irritation   (-) Redness (-) Double vision (-) Blurry vision    Diabetic? (+)  A1c?  (Hemoglobin A1C       Date                     Value               Ref Range             Status                09/04/2019               8.2 (H)             4.0 - 5.6 %           Final              Comment:    ADA Screening Guidelines:  5.7-6.4%  Consistent with   prediabetes  >or=6.5%  Consistent with diabetes  High levels of fetal   hemoglobin interfere with the HbA1C  assay. Heterozygous hemoglobin   variants (HbS, HgC, etc)do  not significantly interfere with this assay.     However, presence of multiple variants may affect accuracy.         04/05/2018               7.3 (H)             4.0 - 5.6 %           Final              Comment:    According to ADA guidelines, hemoglobin A1c <7.0% represents  optimal   control in non-pregnant diabetic patients. Different  metrics may apply to   specific patient populations.   Standards of Medical Care in   Diabetes-2016.  For the purpose of screening for the presence of   diabetes:  <5.7%     Consistent with the absence of diabetes  5.7-6.4%    Consistent with increasing risk for diabetes   (prediabetes)  >or=6.5%    Consistent with diabetes  Currently, no consensus exists for use of   hemoglobin A1c  for diagnosis of diabetes for children.  This Hemoglobin   A1c assay has significant interference with fetal   hemoglobin   (HbF).   The results are invalid for patients with abnormal amounts of   HbF,     including those with known Hereditary Persistence   of Fetal Hemoglobin.   Heterozygous hemoglobin variants (HbAS, HbAC,   HbAD, HbAE, HbA2) do not   significantly interfere with this assay;    however, presence of multiple   variants in a sample may impact the %   interference.         09/22/2017               10.0 (H)            4.0 - 5.6 %           Final              Comment:    According to ADA guidelines, hemoglobin A1c <7.0% represents  optimal   control in non-pregnant diabetic patients. Different  metrics may apply to   specific patient populations.   Standards of Medical Care in   Diabetes-2016.  For the purpose of screening for the presence of   diabetes:  <5.7%     Consistent with the absence of diabetes  5.7-6.4%    Consistent with increasing risk for diabetes   (prediabetes)  >or=6.5%    Consistent with diabetes  Currently, no consensus exists for use of   hemoglobin A1c  for diagnosis of diabetes for children.  This Hemoglobin   A1c assay has significant interference with fetal   hemoglobin   (HbF).   The results are invalid for patients with abnormal amounts of   HbF,     including those with known Hereditary Persistence   of Fetal Hemoglobin.   Heterozygous hemoglobin variants (HbAS, HbAC,   HbAD, HbAE, HbA2) do not   significantly interfere with this assay;   however, presence of multiple   variants in a sample may impact the %   interference.    ----------)        Last edited by Libia Todd on 3/2/2020 10:04 AM. (History)            Assessment /Plan     For exam results, see Encounter Report.    Type 2 diabetes mellitus without retinopathy    Hyperopia of both eyes with astigmatism    Allergic conjunctivitis of both eyes      1. BS control. No signs of diabetic retinopathy. Monitor with annual exam.  2. SRx released to patient. Patient educated on lens options. Normal ocular health. RTC 1 year for routine exam.   3. Recommend Zaditor or Alaway bid OU and cool compresses to help soothe itching. Patient advised to RTC if condition gets worse.

## 2020-04-02 ENCOUNTER — PATIENT OUTREACH (OUTPATIENT)
Dept: ADMINISTRATIVE | Facility: HOSPITAL | Age: 50
End: 2020-04-02

## 2020-04-02 ENCOUNTER — TELEPHONE (OUTPATIENT)
Dept: FAMILY MEDICINE | Facility: CLINIC | Age: 50
End: 2020-04-02

## 2020-04-02 DIAGNOSIS — E11.9 TYPE 2 DIABETES MELLITUS WITHOUT COMPLICATION, WITHOUT LONG-TERM CURRENT USE OF INSULIN: Primary | ICD-10-CM

## 2020-04-02 RX ORDER — GLIPIZIDE 5 MG/1
5 TABLET, FILM COATED, EXTENDED RELEASE ORAL
Qty: 90 TABLET | Refills: 0 | Status: SHIPPED | OUTPATIENT
Start: 2020-04-02 | End: 2021-01-29

## 2020-04-02 NOTE — TELEPHONE ENCOUNTER
----- Message from Mary Houston MD sent at 4/2/2020 11:13 AM CDT -----  Please notify the patient that I have sent in new medication to address his diabetes.  His diabetes is even worse than before.  He will need to begin taking medication as soon as possible.  His current hemoglobin A1c is 8.6.  This is extremely high.  This means that his blood sugars are greater than 180 on a regular basis.  Please schedule an appointment for him to follow-up with me in 3 months.

## 2020-10-09 DIAGNOSIS — E11.9 TYPE 2 DIABETES MELLITUS WITHOUT COMPLICATION, WITHOUT LONG-TERM CURRENT USE OF INSULIN: ICD-10-CM

## 2020-11-20 DIAGNOSIS — E11.9 TYPE 2 DIABETES MELLITUS WITHOUT COMPLICATION, WITHOUT LONG-TERM CURRENT USE OF INSULIN: ICD-10-CM

## 2020-11-20 DIAGNOSIS — E11.9 DIABETES MELLITUS TYPE 2 WITHOUT RETINOPATHY: ICD-10-CM

## 2020-11-20 RX ORDER — METFORMIN HYDROCHLORIDE 500 MG/1
TABLET, EXTENDED RELEASE ORAL
Qty: 30 TABLET | Refills: 0 | Status: SHIPPED | OUTPATIENT
Start: 2020-11-20 | End: 2020-12-30 | Stop reason: SDUPTHER

## 2020-11-20 NOTE — TELEPHONE ENCOUNTER
Patient notified that the refills requested have been approved and the prescriptions have been sent to their preferred pharmacy.Patient was informed an appointment will be required before any additional refills are provided.

## 2020-12-10 ENCOUNTER — TELEPHONE (OUTPATIENT)
Dept: FAMILY MEDICINE | Facility: CLINIC | Age: 50
End: 2020-12-10

## 2020-12-30 ENCOUNTER — TELEPHONE (OUTPATIENT)
Dept: FAMILY MEDICINE | Facility: CLINIC | Age: 50
End: 2020-12-30

## 2020-12-30 DIAGNOSIS — E11.9 DIABETES MELLITUS TYPE 2 WITHOUT RETINOPATHY: ICD-10-CM

## 2020-12-30 DIAGNOSIS — E11.9 TYPE 2 DIABETES MELLITUS WITHOUT COMPLICATION, WITHOUT LONG-TERM CURRENT USE OF INSULIN: ICD-10-CM

## 2020-12-30 RX ORDER — METFORMIN HYDROCHLORIDE 500 MG/1
500 TABLET, EXTENDED RELEASE ORAL DAILY
Qty: 30 TABLET | Refills: 0 | Status: SHIPPED | OUTPATIENT
Start: 2020-12-30 | End: 2021-02-12 | Stop reason: SDUPTHER

## 2020-12-30 NOTE — TELEPHONE ENCOUNTER
Patients appointment was rescheduled and he is out of his metformin. Would like a refill until he is able to come in for his appointment

## 2020-12-30 NOTE — TELEPHONE ENCOUNTER
Patient asking if can come on different date as he was told 01/21 was not a good day for provider. Asking to come on 01/12,01/20, 01/29, or 02/01. Please advise

## 2020-12-30 NOTE — TELEPHONE ENCOUNTER
----- Message from Uma Santamaria sent at 12/30/2020  4:08 PM CST -----  Regarding: self  Type:  Sooner Appointment Request    Patient is requesting a sooner appointment.  Patient declined first available appointment listed as well as another facility and provider .  Patient will not accept being placed on the waitlist and is requesting a message be sent to doctor.    Name of Caller: Self    When is the first available appointment?  01/28/21    Symptoms:  Annual/ prostate exam    Would the patient rather a call back or a response via [facility]? Call     Best Call Back Number: [phone]

## 2021-01-15 ENCOUNTER — PATIENT OUTREACH (OUTPATIENT)
Dept: ADMINISTRATIVE | Facility: HOSPITAL | Age: 51
End: 2021-01-15

## 2021-01-15 ENCOUNTER — TELEPHONE (OUTPATIENT)
Dept: FAMILY MEDICINE | Facility: CLINIC | Age: 51
End: 2021-01-15

## 2021-01-15 DIAGNOSIS — E11.9 TYPE 2 DIABETES MELLITUS WITHOUT COMPLICATION, WITHOUT LONG-TERM CURRENT USE OF INSULIN: Primary | ICD-10-CM

## 2021-01-15 DIAGNOSIS — Z12.5 SCREENING PSA (PROSTATE SPECIFIC ANTIGEN): Primary | ICD-10-CM

## 2021-01-20 ENCOUNTER — LAB VISIT (OUTPATIENT)
Dept: LAB | Facility: HOSPITAL | Age: 51
End: 2021-01-20
Attending: INTERNAL MEDICINE
Payer: COMMERCIAL

## 2021-01-20 DIAGNOSIS — E11.9 TYPE 2 DIABETES MELLITUS WITHOUT COMPLICATION, WITHOUT LONG-TERM CURRENT USE OF INSULIN: ICD-10-CM

## 2021-01-20 DIAGNOSIS — Z12.5 SCREENING PSA (PROSTATE SPECIFIC ANTIGEN): ICD-10-CM

## 2021-01-20 DIAGNOSIS — E78.9 LIPID DISORDER: ICD-10-CM

## 2021-01-20 LAB
ALBUMIN SERPL BCP-MCNC: 4.2 G/DL (ref 3.5–5.2)
ALP SERPL-CCNC: 80 U/L (ref 55–135)
ALT SERPL W/O P-5'-P-CCNC: 36 U/L (ref 10–44)
ANION GAP SERPL CALC-SCNC: 11 MMOL/L (ref 8–16)
AST SERPL-CCNC: 31 U/L (ref 10–40)
BILIRUB SERPL-MCNC: 2.4 MG/DL (ref 0.1–1)
BUN SERPL-MCNC: 17 MG/DL (ref 6–20)
CALCIUM SERPL-MCNC: 9.2 MG/DL (ref 8.7–10.5)
CHLORIDE SERPL-SCNC: 101 MMOL/L (ref 95–110)
CHOLEST SERPL-MCNC: 210 MG/DL (ref 120–199)
CHOLEST/HDLC SERPL: 4 {RATIO} (ref 2–5)
CO2 SERPL-SCNC: 26 MMOL/L (ref 23–29)
COMPLEXED PSA SERPL-MCNC: 0.18 NG/ML (ref 0–4)
CREAT SERPL-MCNC: 1.2 MG/DL (ref 0.5–1.4)
EST. GFR  (AFRICAN AMERICAN): >60 ML/MIN/1.73 M^2
EST. GFR  (NON AFRICAN AMERICAN): >60 ML/MIN/1.73 M^2
GLUCOSE SERPL-MCNC: 129 MG/DL (ref 70–110)
HDLC SERPL-MCNC: 52 MG/DL (ref 40–75)
HDLC SERPL: 24.8 % (ref 20–50)
LDLC SERPL CALC-MCNC: 142.6 MG/DL (ref 63–159)
NONHDLC SERPL-MCNC: 158 MG/DL
POTASSIUM SERPL-SCNC: 3.9 MMOL/L (ref 3.5–5.1)
PROT SERPL-MCNC: 7.6 G/DL (ref 6–8.4)
SODIUM SERPL-SCNC: 138 MMOL/L (ref 136–145)
TRIGL SERPL-MCNC: 77 MG/DL (ref 30–150)

## 2021-01-20 PROCEDURE — 84153 ASSAY OF PSA TOTAL: CPT

## 2021-01-20 PROCEDURE — 80061 LIPID PANEL: CPT

## 2021-01-20 PROCEDURE — 83036 HEMOGLOBIN GLYCOSYLATED A1C: CPT

## 2021-01-20 PROCEDURE — 36415 COLL VENOUS BLD VENIPUNCTURE: CPT | Mod: PO

## 2021-01-20 PROCEDURE — 80053 COMPREHEN METABOLIC PANEL: CPT

## 2021-01-21 LAB
ESTIMATED AVG GLUCOSE: 220 MG/DL (ref 68–131)
HBA1C MFR BLD HPLC: 9.3 % (ref 4–5.6)

## 2021-01-29 ENCOUNTER — OFFICE VISIT (OUTPATIENT)
Dept: FAMILY MEDICINE | Facility: CLINIC | Age: 51
End: 2021-01-29
Payer: COMMERCIAL

## 2021-01-29 VITALS
OXYGEN SATURATION: 99 % | WEIGHT: 196.19 LBS | SYSTOLIC BLOOD PRESSURE: 112 MMHG | TEMPERATURE: 99 F | HEART RATE: 86 BPM | BODY MASS INDEX: 28.09 KG/M2 | DIASTOLIC BLOOD PRESSURE: 80 MMHG | HEIGHT: 70 IN

## 2021-01-29 DIAGNOSIS — E11.9 TYPE 2 DIABETES MELLITUS WITHOUT COMPLICATION, WITHOUT LONG-TERM CURRENT USE OF INSULIN: ICD-10-CM

## 2021-01-29 DIAGNOSIS — N52.01 ERECTILE DYSFUNCTION DUE TO ARTERIAL INSUFFICIENCY: ICD-10-CM

## 2021-01-29 DIAGNOSIS — E11.9 DIABETES MELLITUS TYPE 2 WITHOUT RETINOPATHY: ICD-10-CM

## 2021-01-29 DIAGNOSIS — Z00.00 ANNUAL PHYSICAL EXAM: Primary | ICD-10-CM

## 2021-01-29 PROCEDURE — 99999 PR PBB SHADOW E&M-EST. PATIENT-LVL III: ICD-10-PCS | Mod: PBBFAC,,, | Performed by: FAMILY MEDICINE

## 2021-01-29 PROCEDURE — 99999 PR PBB SHADOW E&M-EST. PATIENT-LVL III: CPT | Mod: PBBFAC,,, | Performed by: FAMILY MEDICINE

## 2021-01-29 PROCEDURE — 99396 PR PREVENTIVE VISIT,EST,40-64: ICD-10-PCS | Mod: S$GLB,,, | Performed by: FAMILY MEDICINE

## 2021-01-29 PROCEDURE — 99396 PREV VISIT EST AGE 40-64: CPT | Mod: S$GLB,,, | Performed by: FAMILY MEDICINE

## 2021-01-29 RX ORDER — DULAGLUTIDE 0.75 MG/.5ML
0.75 INJECTION, SOLUTION SUBCUTANEOUS WEEKLY
Qty: 2 ML | Refills: 2 | Status: SHIPPED | OUTPATIENT
Start: 2021-01-29 | End: 2021-04-22

## 2021-01-29 RX ORDER — TADALAFIL 10 MG/1
10 TABLET ORAL DAILY PRN
Qty: 30 TABLET | Refills: 0 | Status: SHIPPED | OUTPATIENT
Start: 2021-01-29 | End: 2021-08-16

## 2021-02-12 DIAGNOSIS — E11.9 DIABETES MELLITUS TYPE 2 WITHOUT RETINOPATHY: ICD-10-CM

## 2021-02-12 DIAGNOSIS — E11.9 TYPE 2 DIABETES MELLITUS WITHOUT COMPLICATION, WITHOUT LONG-TERM CURRENT USE OF INSULIN: ICD-10-CM

## 2021-02-12 RX ORDER — METFORMIN HYDROCHLORIDE 500 MG/1
500 TABLET, EXTENDED RELEASE ORAL DAILY
Qty: 90 TABLET | Refills: 0 | Status: SHIPPED | OUTPATIENT
Start: 2021-02-12 | End: 2021-02-15 | Stop reason: SDUPTHER

## 2021-02-15 DIAGNOSIS — E11.9 DIABETES MELLITUS TYPE 2 WITHOUT RETINOPATHY: ICD-10-CM

## 2021-02-15 DIAGNOSIS — E11.9 TYPE 2 DIABETES MELLITUS WITHOUT COMPLICATION, WITHOUT LONG-TERM CURRENT USE OF INSULIN: ICD-10-CM

## 2021-02-15 RX ORDER — METFORMIN HYDROCHLORIDE 500 MG/1
500 TABLET, EXTENDED RELEASE ORAL DAILY
Qty: 90 TABLET | Refills: 0 | Status: SHIPPED | OUTPATIENT
Start: 2021-02-15 | End: 2021-05-10 | Stop reason: SDUPTHER

## 2021-04-20 ENCOUNTER — PATIENT OUTREACH (OUTPATIENT)
Dept: ADMINISTRATIVE | Facility: OTHER | Age: 51
End: 2021-04-20

## 2021-04-21 ENCOUNTER — OFFICE VISIT (OUTPATIENT)
Dept: OPTOMETRY | Facility: CLINIC | Age: 51
End: 2021-04-21
Payer: COMMERCIAL

## 2021-04-21 DIAGNOSIS — E11.9 TYPE 2 DIABETES MELLITUS WITHOUT RETINOPATHY: Primary | ICD-10-CM

## 2021-04-21 DIAGNOSIS — H52.4 HYPEROPIA WITH ASTIGMATISM AND PRESBYOPIA, BILATERAL: ICD-10-CM

## 2021-04-21 DIAGNOSIS — H52.203 HYPEROPIA WITH ASTIGMATISM AND PRESBYOPIA, BILATERAL: ICD-10-CM

## 2021-04-21 DIAGNOSIS — H52.03 HYPEROPIA WITH ASTIGMATISM AND PRESBYOPIA, BILATERAL: ICD-10-CM

## 2021-04-21 PROCEDURE — 92015 PR REFRACTION: ICD-10-PCS | Mod: S$GLB,,, | Performed by: OPTOMETRIST

## 2021-04-21 PROCEDURE — 92015 DETERMINE REFRACTIVE STATE: CPT | Mod: S$GLB,,, | Performed by: OPTOMETRIST

## 2021-04-21 PROCEDURE — 92014 COMPRE OPH EXAM EST PT 1/>: CPT | Mod: S$GLB,,, | Performed by: OPTOMETRIST

## 2021-04-21 PROCEDURE — 99999 PR PBB SHADOW E&M-EST. PATIENT-LVL II: ICD-10-PCS | Mod: PBBFAC,,, | Performed by: OPTOMETRIST

## 2021-04-21 PROCEDURE — 92014 PR EYE EXAM, EST PATIENT,COMPREHESV: ICD-10-PCS | Mod: S$GLB,,, | Performed by: OPTOMETRIST

## 2021-04-21 PROCEDURE — 99999 PR PBB SHADOW E&M-EST. PATIENT-LVL II: CPT | Mod: PBBFAC,,, | Performed by: OPTOMETRIST

## 2021-04-23 ENCOUNTER — LAB VISIT (OUTPATIENT)
Dept: LAB | Facility: HOSPITAL | Age: 51
End: 2021-04-23
Attending: FAMILY MEDICINE
Payer: COMMERCIAL

## 2021-04-23 DIAGNOSIS — E11.9 TYPE 2 DIABETES MELLITUS WITHOUT COMPLICATION, WITHOUT LONG-TERM CURRENT USE OF INSULIN: ICD-10-CM

## 2021-04-23 DIAGNOSIS — E11.9 DIABETES MELLITUS TYPE 2 WITHOUT RETINOPATHY: ICD-10-CM

## 2021-04-23 LAB
ESTIMATED AVG GLUCOSE: 146 MG/DL (ref 68–131)
HBA1C MFR BLD: 6.7 % (ref 4–5.6)

## 2021-04-23 PROCEDURE — 36415 COLL VENOUS BLD VENIPUNCTURE: CPT | Mod: PO | Performed by: FAMILY MEDICINE

## 2021-04-23 PROCEDURE — 83036 HEMOGLOBIN GLYCOSYLATED A1C: CPT | Performed by: FAMILY MEDICINE

## 2021-05-10 ENCOUNTER — TELEPHONE (OUTPATIENT)
Dept: FAMILY MEDICINE | Facility: CLINIC | Age: 51
End: 2021-05-10

## 2021-05-10 DIAGNOSIS — E11.9 DIABETES MELLITUS TYPE 2 WITHOUT RETINOPATHY: ICD-10-CM

## 2021-05-10 DIAGNOSIS — E11.9 TYPE 2 DIABETES MELLITUS WITHOUT COMPLICATION, WITHOUT LONG-TERM CURRENT USE OF INSULIN: ICD-10-CM

## 2021-05-10 RX ORDER — METFORMIN HYDROCHLORIDE 500 MG/1
500 TABLET, EXTENDED RELEASE ORAL DAILY
Qty: 90 TABLET | Refills: 0 | Status: SHIPPED | OUTPATIENT
Start: 2021-05-10 | End: 2021-09-16

## 2021-05-10 RX ORDER — DULAGLUTIDE 0.75 MG/.5ML
0.75 INJECTION, SOLUTION SUBCUTANEOUS WEEKLY
Qty: 2 PEN | Refills: 0 | Status: SHIPPED | OUTPATIENT
Start: 2021-05-10 | End: 2021-09-16

## 2021-07-21 ENCOUNTER — TELEPHONE (OUTPATIENT)
Dept: OPTOMETRY | Facility: CLINIC | Age: 51
End: 2021-07-21

## 2021-07-22 ENCOUNTER — TELEPHONE (OUTPATIENT)
Dept: OPTOMETRY | Facility: CLINIC | Age: 51
End: 2021-07-22

## 2021-07-29 ENCOUNTER — OFFICE VISIT (OUTPATIENT)
Dept: OPTOMETRY | Facility: CLINIC | Age: 51
End: 2021-07-29
Payer: COMMERCIAL

## 2021-07-29 DIAGNOSIS — H52.4 HYPEROPIA WITH ASTIGMATISM AND PRESBYOPIA, BILATERAL: Primary | ICD-10-CM

## 2021-07-29 DIAGNOSIS — H52.03 HYPEROPIA WITH ASTIGMATISM AND PRESBYOPIA, BILATERAL: Primary | ICD-10-CM

## 2021-07-29 DIAGNOSIS — H52.203 HYPEROPIA WITH ASTIGMATISM AND PRESBYOPIA, BILATERAL: Primary | ICD-10-CM

## 2021-07-29 PROCEDURE — 99999 PR PBB SHADOW E&M-EST. PATIENT-LVL II: ICD-10-PCS | Mod: PBBFAC,,, | Performed by: OPTOMETRIST

## 2021-07-29 PROCEDURE — 99999 PR PBB SHADOW E&M-EST. PATIENT-LVL II: CPT | Mod: PBBFAC,,, | Performed by: OPTOMETRIST

## 2021-07-29 PROCEDURE — 99499 NO LOS: ICD-10-PCS | Mod: S$GLB,,, | Performed by: OPTOMETRIST

## 2021-07-29 PROCEDURE — 99499 UNLISTED E&M SERVICE: CPT | Mod: S$GLB,,, | Performed by: OPTOMETRIST

## 2021-08-04 ENCOUNTER — PATIENT MESSAGE (OUTPATIENT)
Dept: ADMINISTRATIVE | Facility: HOSPITAL | Age: 51
End: 2021-08-04

## 2021-08-04 DIAGNOSIS — E11.9 TYPE 2 DIABETES MELLITUS WITHOUT COMPLICATION: ICD-10-CM

## 2021-08-11 ENCOUNTER — PATIENT OUTREACH (OUTPATIENT)
Dept: ADMINISTRATIVE | Facility: OTHER | Age: 51
End: 2021-08-11

## 2021-08-11 DIAGNOSIS — E11.9 TYPE 2 DIABETES MELLITUS WITHOUT COMPLICATION, UNSPECIFIED WHETHER LONG TERM INSULIN USE: Primary | ICD-10-CM

## 2021-08-16 ENCOUNTER — LAB VISIT (OUTPATIENT)
Dept: LAB | Facility: HOSPITAL | Age: 51
End: 2021-08-16
Attending: UROLOGY
Payer: COMMERCIAL

## 2021-08-16 ENCOUNTER — OFFICE VISIT (OUTPATIENT)
Dept: UROLOGY | Facility: CLINIC | Age: 51
End: 2021-08-16
Payer: COMMERCIAL

## 2021-08-16 VITALS
DIASTOLIC BLOOD PRESSURE: 81 MMHG | SYSTOLIC BLOOD PRESSURE: 129 MMHG | HEART RATE: 81 BPM | WEIGHT: 194 LBS | BODY MASS INDEX: 27.77 KG/M2 | HEIGHT: 70 IN

## 2021-08-16 DIAGNOSIS — N52.9 ERECTILE DYSFUNCTION, UNSPECIFIED ERECTILE DYSFUNCTION TYPE: ICD-10-CM

## 2021-08-16 DIAGNOSIS — N40.1 BPH WITH URINARY OBSTRUCTION: ICD-10-CM

## 2021-08-16 DIAGNOSIS — N13.8 BPH WITH URINARY OBSTRUCTION: ICD-10-CM

## 2021-08-16 DIAGNOSIS — N52.9 ERECTILE DYSFUNCTION, UNSPECIFIED ERECTILE DYSFUNCTION TYPE: Primary | ICD-10-CM

## 2021-08-16 DIAGNOSIS — E11.9 DIABETES MELLITUS TYPE 2 WITHOUT RETINOPATHY: ICD-10-CM

## 2021-08-16 PROBLEM — Z12.11 COLON CANCER SCREENING: Status: RESOLVED | Noted: 2018-05-16 | Resolved: 2021-08-16

## 2021-08-16 LAB — TESTOST SERPL-MCNC: 335 NG/DL (ref 304–1227)

## 2021-08-16 PROCEDURE — 99214 PR OFFICE/OUTPT VISIT, EST, LEVL IV, 30-39 MIN: ICD-10-PCS | Mod: S$GLB,,, | Performed by: UROLOGY

## 2021-08-16 PROCEDURE — 99214 OFFICE O/P EST MOD 30 MIN: CPT | Mod: S$GLB,,, | Performed by: UROLOGY

## 2021-08-16 PROCEDURE — 99999 PR PBB SHADOW E&M-EST. PATIENT-LVL III: CPT | Mod: PBBFAC,,, | Performed by: UROLOGY

## 2021-08-16 PROCEDURE — 84403 ASSAY OF TOTAL TESTOSTERONE: CPT | Performed by: UROLOGY

## 2021-08-16 PROCEDURE — 36415 COLL VENOUS BLD VENIPUNCTURE: CPT | Performed by: UROLOGY

## 2021-08-16 PROCEDURE — 99999 PR PBB SHADOW E&M-EST. PATIENT-LVL III: ICD-10-PCS | Mod: PBBFAC,,, | Performed by: UROLOGY

## 2021-08-16 RX ORDER — TADALAFIL 20 MG/1
20 TABLET ORAL DAILY
Qty: 10 TABLET | Refills: 11 | Status: SHIPPED | OUTPATIENT
Start: 2021-08-16 | End: 2023-12-20

## 2021-10-18 ENCOUNTER — PATIENT MESSAGE (OUTPATIENT)
Dept: ADMINISTRATIVE | Facility: HOSPITAL | Age: 51
End: 2021-10-18
Payer: COMMERCIAL

## 2021-11-03 ENCOUNTER — PATIENT MESSAGE (OUTPATIENT)
Dept: ADMINISTRATIVE | Facility: HOSPITAL | Age: 51
End: 2021-11-03
Payer: COMMERCIAL

## 2022-02-22 DIAGNOSIS — E11.9 TYPE 2 DIABETES MELLITUS WITHOUT COMPLICATION, WITHOUT LONG-TERM CURRENT USE OF INSULIN: ICD-10-CM

## 2022-02-22 DIAGNOSIS — E11.9 DIABETES MELLITUS TYPE 2 WITHOUT RETINOPATHY: ICD-10-CM

## 2022-02-22 NOTE — TELEPHONE ENCOUNTER
No new care gaps identified.  Powered by [employer]. Reference number: [id number].   2/22/2022 3:00:43 PM CST

## 2022-02-24 RX ORDER — METFORMIN HYDROCHLORIDE 500 MG/1
TABLET, EXTENDED RELEASE ORAL
Qty: 90 TABLET | Refills: 0 | OUTPATIENT
Start: 2022-02-24

## 2022-02-24 NOTE — TELEPHONE ENCOUNTER
Andreia GALLEGOS. Previously Denied   Refill Authorization Note   Jaswinder Valle  is requesting a refill authorization.  Brief Assessment and Rationale for Refill:  Quick Discontinue  Medication Therapy Plan:       Medication Reconciliation Completed:  No      Comments:   Pended Medication(s)       Requested Prescriptions     Refused Prescriptions Disp Refills    metFORMIN (GLUCOPHAGE-XR) 500 MG ER 24hr tablet [Pharmacy Med Name: metFORMIN HCl  MG Oral Tablet Extended Release 24 Hour] 90 tablet 0     Sig: Take 1 tablet by mouth once daily     Refused By: NESS JURADO     Reason for Refusal: Refill not appropriate        Duplicate Pended Encounter(s)/ Last Prescribed Details: (includes pharmacy & prescriber details)   Pharmacy    [facility] [address] [place] [address]  [facility]   [address]  [facility] [address]   Phone:  [phone]  Fax:  [phone]   JANAE #:  --   AUBRIE Reason: --       Outpatient Medication Detail     Disp Refills Start End AUBRIE   metFORMIN (GLUCOPHAGE-XR) 500 MG ER 24hr tablet [Pharmacy Med Name: metFORMIN HCl  MG Oral Tablet Extended Release 24 Hour] 90 tablet 0 2/21/2022  No   Request refused: Patient needs an appointment   Sig: Take 1 tablet by mouth once daily   Class: Normal   Order: [id number]   Date/Time Signed: 2/21/2022 08:38         Renewals    Renewal provider: Angie eRal MD          Ordering Encounter Report    Associated Reports   View Encounter                Note composed:8:13 AM 02/24/2022

## 2022-03-03 ENCOUNTER — TELEPHONE (OUTPATIENT)
Dept: FAMILY MEDICINE | Facility: CLINIC | Age: 52
End: 2022-03-03
Payer: COMMERCIAL

## 2022-03-03 NOTE — TELEPHONE ENCOUNTER
----- Message from Tommie Wolff MA sent at 3/3/2022  9:34 AM CST -----  Type: Patient Call Back    Who called:Self    What is the request in detail:pt. Is asking for a nurse to give him a call back.. his medication was denied .. when I offered to schedule him for an appt. He said no .. he's dealing with an issue and wants to speak to staff ..     Can the clinic reply by [facility]?no    Would the patient rather a call back or a response via [facility]? yes    Best call back number:[phone] (home)

## 2022-03-03 NOTE — TELEPHONE ENCOUNTER
Patient notified he need a office visit to receive a refill on his metformin per Dr. Braden. Patient states he can not come into the office to for a appointment right now. He will call back to schedule.

## 2022-03-04 ENCOUNTER — TELEPHONE (OUTPATIENT)
Dept: FAMILY MEDICINE | Facility: CLINIC | Age: 52
End: 2022-03-04
Payer: COMMERCIAL

## 2022-03-04 DIAGNOSIS — E11.9 TYPE 2 DIABETES MELLITUS WITHOUT COMPLICATION, WITHOUT LONG-TERM CURRENT USE OF INSULIN: Primary | ICD-10-CM

## 2022-03-04 DIAGNOSIS — N52.9 ERECTILE DYSFUNCTION, UNSPECIFIED ERECTILE DYSFUNCTION TYPE: ICD-10-CM

## 2022-03-04 NOTE — TELEPHONE ENCOUNTER
----- Message from Vivian Yepez sent at 3/4/2022 12:13 PM CST -----  Type: Lab    Caller is requesting to schedule their Lab appointment prior to annual appointment.    Order is not listed in EPIC.  Please enter order and contact patient to schedule.    Name of Caller: Self     Preferred Date and Time of Labs: anytime    Date of EPP Appointment:3/16    Where would they like the lab performed? Lab in [place]      Would the patient rather a call back or a response via [facility]? Call     Best Call Back Number:.[phone] (home)

## 2022-05-12 ENCOUNTER — OFFICE VISIT (OUTPATIENT)
Dept: CARDIOLOGY | Facility: CLINIC | Age: 52
End: 2022-05-12
Payer: COMMERCIAL

## 2022-05-12 VITALS
RESPIRATION RATE: 15 BRPM | SYSTOLIC BLOOD PRESSURE: 128 MMHG | BODY MASS INDEX: 27.77 KG/M2 | OXYGEN SATURATION: 99 % | HEART RATE: 76 BPM | DIASTOLIC BLOOD PRESSURE: 82 MMHG | HEIGHT: 70 IN | WEIGHT: 194 LBS

## 2022-05-12 DIAGNOSIS — E11.9 DIABETES MELLITUS TYPE 2 WITHOUT RETINOPATHY: ICD-10-CM

## 2022-05-12 DIAGNOSIS — E78.5 DYSLIPIDEMIA: Primary | ICD-10-CM

## 2022-05-12 DIAGNOSIS — E11.9 TYPE 2 DIABETES MELLITUS WITHOUT COMPLICATION, WITHOUT LONG-TERM CURRENT USE OF INSULIN: ICD-10-CM

## 2022-05-12 DIAGNOSIS — N13.8 BPH WITH URINARY OBSTRUCTION: ICD-10-CM

## 2022-05-12 DIAGNOSIS — N40.1 BPH WITH URINARY OBSTRUCTION: ICD-10-CM

## 2022-05-12 DIAGNOSIS — H25.13 NUCLEAR SCLEROSIS, BILATERAL: ICD-10-CM

## 2022-05-12 PROCEDURE — 99204 OFFICE O/P NEW MOD 45 MIN: CPT | Mod: S$GLB,,, | Performed by: INTERNAL MEDICINE

## 2022-05-12 PROCEDURE — 99999 PR PBB SHADOW E&M-EST. PATIENT-LVL III: CPT | Mod: PBBFAC,,, | Performed by: INTERNAL MEDICINE

## 2022-05-12 PROCEDURE — 93000 ELECTROCARDIOGRAM COMPLETE: CPT | Mod: S$GLB,,, | Performed by: INTERNAL MEDICINE

## 2022-05-12 PROCEDURE — 99999 PR PBB SHADOW E&M-EST. PATIENT-LVL III: ICD-10-PCS | Mod: PBBFAC,,, | Performed by: INTERNAL MEDICINE

## 2022-05-12 PROCEDURE — 93000 EKG 12-LEAD: ICD-10-PCS | Mod: S$GLB,,, | Performed by: INTERNAL MEDICINE

## 2022-05-12 PROCEDURE — 99204 PR OFFICE/OUTPT VISIT, NEW, LEVL IV, 45-59 MIN: ICD-10-PCS | Mod: S$GLB,,, | Performed by: INTERNAL MEDICINE

## 2022-05-12 NOTE — PROGRESS NOTES
CARDIOVASCULAR CONSULTATION    REASON FOR CONSULT:   Jaswinder Valle is a 52 y.o. male who presents for cardiovascular risk assessment      HISTORY OF PRESENT ILLNESS:     Patient is a pleasant 49-year-old man who was recently diagnosed with diabetes.  States that he has a very active person and denies any chest pains at rest on exertion, orthopnea, PND.  States that he has to climb multiple flights of stair at has never had any issues with that.  Wants to optimize his cardiovascular risk    Notes from October 2019:  Patient here for follow-up today.  States that he does not want to take Lipitor and never even started..  He wants to changes diet and exercise and lifestyle patterns and see how that affects his diabetes and his lipid profile.  We spent a considerable amount of time talking about the data behind diet and exercise as well as Lipitor and diabetes.  Patient states that he understands and would try lifestyle modifications and agreed for rechecking lipid profile in 6 months.      May 22:  Patient here for follow-up after a long hiatus.  Denies any chest pains at rest on exertion, orthopnea, PND.  Concerned about the condition of his heart because uncontrolled diabetes as well as dyslipidemia.  Still refusing to take statins.    PAST MEDICAL HISTORY:     Past Medical History:   Diagnosis Date    Diabetes mellitus type 2 without retinopathy 6/30/2016    Diabetes mellitus, type 2     Diabetes type 2, uncontrolled 6/29/2016    Nuclear sclerosis of both eyes 6/30/2016       PAST SURGICAL HISTORY:     Past Surgical History:   Procedure Laterality Date    COLONOSCOPY N/A 5/16/2018    Procedure: COLONOSCOPY;  Surgeon: Jovany Mendiola MD;  Location: [facility];  Service: Endoscopy;  Laterality: N/A;       ALLERGIES AND MEDICATION:     Review of patient's allergies indicates:   Allergen Reactions    No known drug allergies         Medication List          Accurate as of May 12, 2022  2:01 PM. If you have any  questions, ask your nurse or doctor.            CONTINUE taking these medications    metFORMIN 500 MG ER 24hr tablet  Commonly known as: GLUCOPHAGE-XR  Take 1 tablet by mouth once daily     tadalafiL 20 MG Tab  Commonly known as: CIALIS  Take 1 tablet (20 mg total) by mouth once daily. Take 1 hour before intercourse     TRULICITY 0.75 mg/0.5 mL pen injector  Generic drug: dulaglutide  INJECT 1 SYRINGE (0.75 MG)  SUBCUTANEOUSLY ONCE A WEEK            SOCIAL HISTORY:     Social History     Socioeconomic History    Marital status: Single   Tobacco Use    Smoking status: Never Smoker    Smokeless tobacco: Never Used   Substance and Sexual Activity    Alcohol use: Yes    Drug use: No    Sexual activity: Yes     Partners: Female       FAMILY HISTORY:     Family History   Problem Relation Age of Onset    Cataracts Mother     Diabetes Mother     No Known Problems Father     No Known Problems Sister     No Known Problems Brother     No Known Problems Maternal Aunt     No Known Problems Maternal Uncle     No Known Problems Paternal Aunt     No Known Problems Paternal Uncle     No Known Problems Maternal Grandmother     No Known Problems Maternal Grandfather     No Known Problems Paternal Grandmother     No Known Problems Paternal Grandfather     Amblyopia Neg Hx     Blindness Neg Hx     Cancer Neg Hx     Hypertension Neg Hx     Macular degeneration Neg Hx     Retinal detachment Neg Hx     Strabismus Neg Hx     Stroke Neg Hx     Thyroid disease Neg Hx     Glaucoma Neg Hx        REVIEW OF SYSTEMS:   Review of Systems   Constitutional: Negative.    HENT: Negative.    Eyes: Negative.    Respiratory: Negative.    Cardiovascular: Negative.    Gastrointestinal: Negative.    Genitourinary: Negative.    Musculoskeletal: Negative.    Skin: Negative.    Neurological: Negative.    Endo/Heme/Allergies: Negative.    Psychiatric/Behavioral: Negative.        A 10 point review of systems was performed and all the  "pertinent positives have been mentioned. Rest of review of systems was negative.        PHYSICAL EXAM:     Vitals:    05/12/22 1135   BP: 128/82   Pulse: 76   Resp: 15    Body mass index is 27.84 kg/m².  Weight: 88 kg (194 lb 0.1 oz)   Height: 5' 10" (177.8 cm)     Physical Exam   Constitutional: He is oriented to person, place, and time. He appears well-developed and well-nourished. He is active.   HENT:   Head: Normocephalic and atraumatic.   Right Ear: Hearing normal.   Left Ear: Hearing normal.   Nose: Nose normal.   Mouth/Throat: Mucous membranes are normal.   Eyes: Pupils are equal, round, and reactive to light. Conjunctivae and lids are normal.   Neck: Normal range of motion. Neck supple.   Cardiovascular: Normal rate, regular rhythm, normal heart sounds, intact distal pulses and normal pulses.   Pulmonary/Chest: Effort normal and breath sounds normal.   Abdominal: Soft. Normal appearance. There is no tenderness.   Musculoskeletal: He exhibits no edema or deformity.   Neurological: He is alert and oriented to person, place, and time.   Skin: Skin is warm, dry and intact.   Psychiatric: He has a normal mood and affect. His speech is normal.   Nursing note and vitals reviewed.        DATA:     Laboratory:  CBC:        CHEMISTRIES:  Recent Labs   Lab 01/20/21  1312   Glucose 129 H   Sodium 138   Potassium 3.9   BUN 17   Creatinine 1.2   eGFR if African American >60.0   eGFR if non African American >60.0   Calcium 9.2       CARDIAC BIOMARKERS:        COAGS:        LIPIDS/LFTS:  Recent Labs   Lab 10/02/19  1509 01/20/21  1312   Cholesterol  --  210 H   Triglycerides  --  77   HDL  --  52   LDL Cholesterol  --  142.6   Non-HDL Cholesterol  --  158   AST 19 31   ALT 25 36       Hemoglobin A1C   Date Value Ref Range Status   04/23/2021 6.7 (H) 4.0 - 5.6 % Final     Comment:     ADA Screening Guidelines:  5.7-6.4%  Consistent with prediabetes  >or=6.5%  Consistent with diabetes    High levels of fetal hemoglobin " interfere with the HbA1C  assay. Heterozygous hemoglobin variants (HbS, HgC, etc)do  not significantly interfere with this assay.   However, presence of multiple variants may affect accuracy.     01/20/2021 9.3 (H) 4.0 - 5.6 % Final     Comment:     ADA Screening Guidelines:  5.7-6.4%  Consistent with prediabetes  >or=6.5%  Consistent with diabetes  High levels of fetal hemoglobin interfere with the HbA1C  assay. Heterozygous hemoglobin variants (HbS, HgC, etc)do  not significantly interfere with this assay.   However, presence of multiple variants may affect accuracy.     02/13/2020 8.6 (H) 4.8 - 5.6 % Final     Comment:     [facility]   09/04/2019 8.2 (H) 4.0 - 5.6 % Final     Comment:     ADA Screening Guidelines:  5.7-6.4%  Consistent with prediabetes  >or=6.5%  Consistent with diabetes  High levels of fetal hemoglobin interfere with the HbA1C  assay. Heterozygous hemoglobin variants (HbS, HgC, etc)do  not significantly interfere with this assay.   However, presence of multiple variants may affect accuracy.         TSH        The 10-year ASCVD risk score (Myrtle EL Jr., et al., 2013) is: 11.9%    Values used to calculate the score:      Age: 52 years      Sex: Male      Is Non- : Yes      Diabetic: Yes      Tobacco smoker: No      Systolic Blood Pressure: 128 mmHg      Is BP treated: No      HDL Cholesterol: 52 mg/dL      Total Cholesterol: 210 mg/dL           Cardiovascular Testing:          ASSESSMENT AND PLAN     Patient Active Problem List   Diagnosis    Diabetes mellitus type 2 without retinopathy    Nuclear sclerosis, bilateral    Refractive error    Type 2 diabetes mellitus without complication, without long-term current use of insulin    Erectile dysfunction    BPH with urinary obstruction       1.  Cardiovascular risk optimization.  ASCVD risk 11.9%.  Considering his diabetes and his ASCVD risk, recommended starting Lipitor.  Patient states he does not want to take Lipitor or  any other statins.  try lifestyle and diet.    Check stress echocardiogram.  Follow-up after testing.      Thank you very much for involving me in the care of your patient.  Please do not hesitate to contact me if there are any questions.      Km Patel MD, FACC, [facility]  Interventional Cardiologist, [facility].           This note was dictated with the help of speech recognition software.  There might be un-intended errors and/or substitutions.

## 2022-05-26 ENCOUNTER — OFFICE VISIT (OUTPATIENT)
Dept: CARDIOLOGY | Facility: CLINIC | Age: 52
End: 2022-05-26
Payer: COMMERCIAL

## 2022-05-26 VITALS
HEART RATE: 78 BPM | BODY MASS INDEX: 27.46 KG/M2 | WEIGHT: 191.81 LBS | RESPIRATION RATE: 15 BRPM | HEIGHT: 70 IN | OXYGEN SATURATION: 99 % | SYSTOLIC BLOOD PRESSURE: 136 MMHG | DIASTOLIC BLOOD PRESSURE: 78 MMHG

## 2022-05-26 DIAGNOSIS — I42.9 CARDIOMYOPATHY: ICD-10-CM

## 2022-05-26 DIAGNOSIS — E11.9 DIABETES MELLITUS TYPE 2 WITHOUT RETINOPATHY: ICD-10-CM

## 2022-05-26 DIAGNOSIS — E11.9 TYPE 2 DIABETES MELLITUS WITHOUT COMPLICATION, WITHOUT LONG-TERM CURRENT USE OF INSULIN: ICD-10-CM

## 2022-05-26 DIAGNOSIS — N52.9 ERECTILE DYSFUNCTION, UNSPECIFIED ERECTILE DYSFUNCTION TYPE: ICD-10-CM

## 2022-05-26 DIAGNOSIS — I25.10 CORONARY ARTERY DISEASE, UNSPECIFIED VESSEL OR LESION TYPE, UNSPECIFIED WHETHER ANGINA PRESENT, UNSPECIFIED WHETHER NATIVE OR TRANSPLANTED HEART: Primary | ICD-10-CM

## 2022-05-26 DIAGNOSIS — H25.13 NUCLEAR SCLEROSIS, BILATERAL: ICD-10-CM

## 2022-05-26 PROCEDURE — 99999 PR PBB SHADOW E&M-EST. PATIENT-LVL IV: ICD-10-PCS | Mod: PBBFAC,,, | Performed by: INTERNAL MEDICINE

## 2022-05-26 PROCEDURE — 99215 OFFICE O/P EST HI 40 MIN: CPT | Mod: S$GLB,,, | Performed by: INTERNAL MEDICINE

## 2022-05-26 PROCEDURE — 99999 PR PBB SHADOW E&M-EST. PATIENT-LVL IV: CPT | Mod: PBBFAC,,, | Performed by: INTERNAL MEDICINE

## 2022-05-26 PROCEDURE — 99215 PR OFFICE/OUTPT VISIT, EST, LEVL V, 40-54 MIN: ICD-10-PCS | Mod: S$GLB,,, | Performed by: INTERNAL MEDICINE

## 2022-05-26 RX ORDER — DIPHENHYDRAMINE HCL 25 MG
50 CAPSULE ORAL ONCE
Status: CANCELLED | OUTPATIENT
Start: 2022-05-26 | End: 2022-05-26

## 2022-05-26 RX ORDER — NAPROXEN SODIUM 220 MG/1
81 TABLET, FILM COATED ORAL DAILY
Qty: 90 TABLET | Refills: 3 | Status: SHIPPED | OUTPATIENT
Start: 2022-05-26 | End: 2023-07-19 | Stop reason: SDUPTHER

## 2022-05-26 RX ORDER — ATORVASTATIN CALCIUM 40 MG/1
40 TABLET, FILM COATED ORAL NIGHTLY
Qty: 90 TABLET | Refills: 3 | Status: SHIPPED | OUTPATIENT
Start: 2022-05-26 | End: 2023-07-19 | Stop reason: SDUPTHER

## 2022-05-26 RX ORDER — CLOPIDOGREL 300 MG/1
600 TABLET, FILM COATED ORAL ONCE
Status: CANCELLED | OUTPATIENT
Start: 2022-05-26 | End: 2022-05-26

## 2022-05-26 RX ORDER — LOSARTAN POTASSIUM 25 MG/1
25 TABLET ORAL DAILY
Qty: 90 TABLET | Refills: 3 | Status: SHIPPED | OUTPATIENT
Start: 2022-05-26 | End: 2023-07-19 | Stop reason: SDUPTHER

## 2022-05-26 RX ORDER — SODIUM CHLORIDE 9 MG/ML
INJECTION, SOLUTION INTRAVENOUS CONTINUOUS
Status: CANCELLED | OUTPATIENT
Start: 2022-05-26

## 2022-05-26 NOTE — PROGRESS NOTES
CARDIOVASCULAR CONSULTATION    REASON FOR CONSULT:   Jaswinder Valle is a 52 y.o. male who presents for cardiovascular risk assessment      HISTORY OF PRESENT ILLNESS:     Patient is a pleasant 49-year-old man who was recently diagnosed with diabetes.  States that he has a very active person and denies any chest pains at rest on exertion, orthopnea, PND.  States that he has to climb multiple flights of stair at has never had any issues with that.  Wants to optimize his cardiovascular risk    Notes from October 2019:  Patient here for follow-up today.  States that he does not want to take Lipitor and never even started..  He wants to changes diet and exercise and lifestyle patterns and see how that affects his diabetes and his lipid profile.  We spent a considerable amount of time talking about the data behind diet and exercise as well as Lipitor and diabetes.  Patient states that he understands and would try lifestyle modifications and agreed for rechecking lipid profile in 6 months.      May 22:  Patient here for follow-up after a long hiatus.  Denies any chest pains at rest on exertion, orthopnea, PND.  Concerned about the condition of his heart because uncontrolled diabetes as well as dyslipidemia.  Still refusing to take statins.      Notes from May 2022:  Patient here for follow-up.  Asymptomatic.  Stress test abnormal.  EF 45%.  Stress EKG abnormal.    · The ECG portion of this study is positive for myocardial ischemia.  · The stress echo portion of this study is negative for myocardial ischemia.  · Mildly decreased systolic function.  · The estimated ejection fraction is 45%.  · The patient's exercise capacity was normal.  · The test was stopped because the patient experienced fatigue.  · There were no arrhythmias during stress.        PAST MEDICAL HISTORY:     Past Medical History:   Diagnosis Date    Diabetes mellitus type 2 without retinopathy 6/30/2016    Diabetes mellitus, type 2     Diabetes type 2,  uncontrolled 6/29/2016    Nuclear sclerosis of both eyes 6/30/2016       PAST SURGICAL HISTORY:     Past Surgical History:   Procedure Laterality Date    COLONOSCOPY N/A 5/16/2018    Procedure: COLONOSCOPY;  Surgeon: Jovany Mendiola MD;  Location: [facility];  Service: Endoscopy;  Laterality: N/A;       ALLERGIES AND MEDICATION:     Review of patient's allergies indicates:   Allergen Reactions    No known drug allergies         Medication List          Accurate as of May 26, 2022  9:08 AM. If you have any questions, ask your nurse or doctor.            CONTINUE taking these medications    metFORMIN 500 MG ER 24hr tablet  Commonly known as: GLUCOPHAGE-XR  Take 1 tablet by mouth once daily     tadalafiL 20 MG Tab  Commonly known as: CIALIS  Take 1 tablet (20 mg total) by mouth once daily. Take 1 hour before intercourse     TRULICITY 0.75 mg/0.5 mL pen injector  Generic drug: dulaglutide  INJECT 1 SYRINGE (0.75 MG)  SUBCUTANEOUSLY ONCE A WEEK            SOCIAL HISTORY:     Social History     Socioeconomic History    Marital status: Single   Tobacco Use    Smoking status: Never Smoker    Smokeless tobacco: Never Used   Substance and Sexual Activity    Alcohol use: Yes    Drug use: No    Sexual activity: Yes     Partners: Female       FAMILY HISTORY:     Family History   Problem Relation Age of Onset    Cataracts Mother     Diabetes Mother     No Known Problems Father     No Known Problems Sister     No Known Problems Brother     No Known Problems Maternal Aunt     No Known Problems Maternal Uncle     No Known Problems Paternal Aunt     No Known Problems Paternal Uncle     No Known Problems Maternal Grandmother     No Known Problems Maternal Grandfather     No Known Problems Paternal Grandmother     No Known Problems Paternal Grandfather     Amblyopia Neg Hx     Blindness Neg Hx     Cancer Neg Hx     Hypertension Neg Hx     Macular degeneration Neg Hx     Retinal detachment Neg Hx      "Strabismus Neg Hx     Stroke Neg Hx     Thyroid disease Neg Hx     Glaucoma Neg Hx        REVIEW OF SYSTEMS:   Review of Systems   Constitutional: Negative.    HENT: Negative.    Eyes: Negative.    Respiratory: Negative.    Cardiovascular: Negative.    Gastrointestinal: Negative.    Genitourinary: Negative.    Musculoskeletal: Negative.    Skin: Negative.    Neurological: Negative.    Endo/Heme/Allergies: Negative.    Psychiatric/Behavioral: Negative.        A 10 point review of systems was performed and all the pertinent positives have been mentioned. Rest of review of systems was negative.        PHYSICAL EXAM:     Vitals:    05/26/22 0857   BP: 136/78   Pulse: 78   Resp: 15    Body mass index is 27.52 kg/m².  Weight: 87 kg (191 lb 12.8 oz)   Height: 5' 10" (177.8 cm)     Physical Exam   Constitutional: He is oriented to person, place, and time. He appears well-developed and well-nourished. He is active.   HENT:   Head: Normocephalic and atraumatic.   Right Ear: Hearing normal.   Left Ear: Hearing normal.   Nose: Nose normal.   Mouth/Throat: Mucous membranes are normal.   Eyes: Pupils are equal, round, and reactive to light. Conjunctivae and lids are normal.   Neck: Normal range of motion. Neck supple.   Cardiovascular: Normal rate, regular rhythm, normal heart sounds, intact distal pulses and normal pulses.   Pulmonary/Chest: Effort normal and breath sounds normal.   Abdominal: Soft. Normal appearance. There is no tenderness.   Musculoskeletal: He exhibits no edema or deformity.   Neurological: He is alert and oriented to person, place, and time.   Skin: Skin is warm, dry and intact.   Psychiatric: He has a normal mood and affect. His speech is normal.   Nursing note and vitals reviewed.        DATA:     Laboratory:  CBC:        CHEMISTRIES:  Recent Labs   Lab 01/20/21  1312   Glucose 129 H   Sodium 138   Potassium 3.9   BUN 17   Creatinine 1.2   eGFR if African American >60.0   eGFR if non African American " >60.0   Calcium 9.2       CARDIAC BIOMARKERS:        COAGS:        LIPIDS/LFTS:  Recent Labs   Lab 10/02/19  1509 01/20/21  1312   Cholesterol  --  210 H   Triglycerides  --  77   HDL  --  52   LDL Cholesterol  --  142.6   Non-HDL Cholesterol  --  158   AST 19 31   ALT 25 36       Hemoglobin A1C   Date Value Ref Range Status   04/23/2021 6.7 (H) 4.0 - 5.6 % Final     Comment:     ADA Screening Guidelines:  5.7-6.4%  Consistent with prediabetes  >or=6.5%  Consistent with diabetes    High levels of fetal hemoglobin interfere with the HbA1C  assay. Heterozygous hemoglobin variants (HbS, HgC, etc)do  not significantly interfere with this assay.   However, presence of multiple variants may affect accuracy.     01/20/2021 9.3 (H) 4.0 - 5.6 % Final     Comment:     ADA Screening Guidelines:  5.7-6.4%  Consistent with prediabetes  >or=6.5%  Consistent with diabetes  High levels of fetal hemoglobin interfere with the HbA1C  assay. Heterozygous hemoglobin variants (HbS, HgC, etc)do  not significantly interfere with this assay.   However, presence of multiple variants may affect accuracy.     02/13/2020 8.6 (H) 4.8 - 5.6 % Final     Comment:     [facility]   09/04/2019 8.2 (H) 4.0 - 5.6 % Final     Comment:     ADA Screening Guidelines:  5.7-6.4%  Consistent with prediabetes  >or=6.5%  Consistent with diabetes  High levels of fetal hemoglobin interfere with the HbA1C  assay. Heterozygous hemoglobin variants (HbS, HgC, etc)do  not significantly interfere with this assay.   However, presence of multiple variants may affect accuracy.         TSH        The 10-year ASCVD risk score (Myrtle GALLEGOS Jr., et al., 2013) is: 13.1%    Values used to calculate the score:      Age: 52 years      Sex: Male      Is Non- : Yes      Diabetic: Yes      Tobacco smoker: No      Systolic Blood Pressure: 136 mmHg      Is BP treated: No      HDL Cholesterol: 52 mg/dL      Total Cholesterol: 210 mg/dL           Cardiovascular  Testing:          ASSESSMENT AND PLAN     Patient Active Problem List   Diagnosis    Diabetes mellitus type 2 without retinopathy    Nuclear sclerosis, bilateral    Refractive error    Type 2 diabetes mellitus without complication, without long-term current use of insulin    Erectile dysfunction    BPH with urinary obstruction       1.  Cardiovascular risk optimization.  ASCVD risk 11.9%.  Considering his diabetes and his ASCVD risk, recommended starting Lipitor.  Patient agreeable.  Initiate Lipitor check LFTs in 6 weeks    2. Cardiomyopathy with EF of 45% and abnormal stress EKG.  Stress echo portion was normal.  Because of EF of 45% and abnormal stress EKG, will proceed with coronary angiogram to find underlying etiology of drop in since his echo in 2019. I have asked patient to start taking aspirin 81 mg daily as well as losartan 25 mg daily.  Will initiate Toprol-XL at next visit.  Check BMP in 1 week.      Risks, benefits and alternatives of the catheterization procedure were discussed with the patient.The risks of coronary angiography include but are not limited to: bleeding, infection, death, heart attack, arrhythmia, kidney injury or failure, potential need for dialysis, allergic reactions, stroke, need for emergency surgery, hematoma, pseudoaneurysm etc.  Should stenting be indicated, the patient has agreed to dual anti-platelet therapy for 1-consecutive year with a drug-eluting stent and a minimum of 1-month with the use of a bare metal stent. Additionally, pt is aware that non-compliance is likely to result in stent clotting with heart attack, heart failure, and/or death  The risks of moderate sedation include hypotension, respiratory depression, arrhythmias, bronchospasm, and death. Informed consent was obtained and the  patient is agreeable to proceed with the procedure. Consent was placed on the chart.            Thank you very much for involving me in the care of your patient.  Please do not  hesitate to contact me if there are any questions.      Km Patel MD, FAC, [facility]  Interventional Cardiologist, [facility].           This note was dictated with the help of speech recognition software.  There might be un-intended errors and/or substitutions.

## 2022-06-01 ENCOUNTER — PATIENT MESSAGE (OUTPATIENT)
Dept: ADMINISTRATIVE | Facility: HOSPITAL | Age: 52
End: 2022-06-01
Payer: COMMERCIAL

## 2022-06-20 ENCOUNTER — PATIENT MESSAGE (OUTPATIENT)
Dept: FAMILY MEDICINE | Facility: CLINIC | Age: 52
End: 2022-06-20
Payer: COMMERCIAL

## 2022-06-24 ENCOUNTER — LAB VISIT (OUTPATIENT)
Dept: LAB | Facility: HOSPITAL | Age: 52
End: 2022-06-24
Attending: FAMILY MEDICINE
Payer: COMMERCIAL

## 2022-06-24 DIAGNOSIS — E11.9 TYPE 2 DIABETES MELLITUS WITHOUT COMPLICATION, WITHOUT LONG-TERM CURRENT USE OF INSULIN: ICD-10-CM

## 2022-06-24 LAB
ALBUMIN SERPL BCP-MCNC: 3.9 G/DL (ref 3.5–5.2)
ALP SERPL-CCNC: 91 U/L (ref 55–135)
ALT SERPL W/O P-5'-P-CCNC: 58 U/L (ref 10–44)
ANION GAP SERPL CALC-SCNC: 12 MMOL/L (ref 8–16)
AST SERPL-CCNC: 33 U/L (ref 10–40)
BASOPHILS # BLD AUTO: 0.02 K/UL (ref 0–0.2)
BASOPHILS NFR BLD: 0.3 % (ref 0–1.9)
BILIRUB SERPL-MCNC: 2 MG/DL (ref 0.1–1)
BUN SERPL-MCNC: 17 MG/DL (ref 6–20)
CALCIUM SERPL-MCNC: 9.5 MG/DL (ref 8.7–10.5)
CHLORIDE SERPL-SCNC: 98 MMOL/L (ref 95–110)
CHOLEST SERPL-MCNC: 131 MG/DL (ref 120–199)
CHOLEST/HDLC SERPL: 2.6 {RATIO} (ref 2–5)
CO2 SERPL-SCNC: 27 MMOL/L (ref 23–29)
CREAT SERPL-MCNC: 1.4 MG/DL (ref 0.5–1.4)
DIFFERENTIAL METHOD: NORMAL
EOSINOPHIL # BLD AUTO: 0.1 K/UL (ref 0–0.5)
EOSINOPHIL NFR BLD: 1.9 % (ref 0–8)
ERYTHROCYTE [DISTWIDTH] IN BLOOD BY AUTOMATED COUNT: 12.8 % (ref 11.5–14.5)
EST. GFR  (AFRICAN AMERICAN): >60 ML/MIN/1.73 M^2
EST. GFR  (NON AFRICAN AMERICAN): 56.6 ML/MIN/1.73 M^2
ESTIMATED AVG GLUCOSE: 301 MG/DL (ref 68–131)
GLUCOSE SERPL-MCNC: 272 MG/DL (ref 70–110)
HBA1C MFR BLD: 12.1 % (ref 4–5.6)
HCT VFR BLD AUTO: 48.8 % (ref 40–54)
HDLC SERPL-MCNC: 50 MG/DL (ref 40–75)
HDLC SERPL: 38.2 % (ref 20–50)
HGB BLD-MCNC: 15.8 G/DL (ref 14–18)
IMM GRANULOCYTES # BLD AUTO: 0.01 K/UL (ref 0–0.04)
IMM GRANULOCYTES NFR BLD AUTO: 0.2 % (ref 0–0.5)
LDLC SERPL CALC-MCNC: 63.2 MG/DL (ref 63–159)
LYMPHOCYTES # BLD AUTO: 1.4 K/UL (ref 1–4.8)
LYMPHOCYTES NFR BLD: 23.1 % (ref 18–48)
MCH RBC QN AUTO: 30.1 PG (ref 27–31)
MCHC RBC AUTO-ENTMCNC: 32.4 G/DL (ref 32–36)
MCV RBC AUTO: 93 FL (ref 82–98)
MONOCYTES # BLD AUTO: 0.4 K/UL (ref 0.3–1)
MONOCYTES NFR BLD: 6.3 % (ref 4–15)
NEUTROPHILS # BLD AUTO: 4 K/UL (ref 1.8–7.7)
NEUTROPHILS NFR BLD: 68.2 % (ref 38–73)
NONHDLC SERPL-MCNC: 81 MG/DL
NRBC BLD-RTO: 0 /100 WBC
PLATELET # BLD AUTO: 183 K/UL (ref 150–450)
PMV BLD AUTO: 11.9 FL (ref 9.2–12.9)
POTASSIUM SERPL-SCNC: 4.4 MMOL/L (ref 3.5–5.1)
PROT SERPL-MCNC: 7.3 G/DL (ref 6–8.4)
RBC # BLD AUTO: 5.25 M/UL (ref 4.6–6.2)
SODIUM SERPL-SCNC: 137 MMOL/L (ref 136–145)
TRIGL SERPL-MCNC: 89 MG/DL (ref 30–150)
TSH SERPL DL<=0.005 MIU/L-ACNC: 0.98 UIU/ML (ref 0.4–4)
WBC # BLD AUTO: 5.88 K/UL (ref 3.9–12.7)

## 2022-06-24 PROCEDURE — 80061 LIPID PANEL: CPT | Performed by: FAMILY MEDICINE

## 2022-06-24 PROCEDURE — 83036 HEMOGLOBIN GLYCOSYLATED A1C: CPT | Performed by: FAMILY MEDICINE

## 2022-06-24 PROCEDURE — 85025 COMPLETE CBC W/AUTO DIFF WBC: CPT | Performed by: FAMILY MEDICINE

## 2022-06-24 PROCEDURE — 36415 COLL VENOUS BLD VENIPUNCTURE: CPT | Mod: PO | Performed by: FAMILY MEDICINE

## 2022-06-24 PROCEDURE — 80053 COMPREHEN METABOLIC PANEL: CPT | Performed by: FAMILY MEDICINE

## 2022-06-24 PROCEDURE — 84443 ASSAY THYROID STIM HORMONE: CPT | Performed by: FAMILY MEDICINE

## 2022-06-28 ENCOUNTER — OFFICE VISIT (OUTPATIENT)
Dept: FAMILY MEDICINE | Facility: CLINIC | Age: 52
End: 2022-06-28
Payer: COMMERCIAL

## 2022-06-28 VITALS
OXYGEN SATURATION: 98 % | WEIGHT: 195.13 LBS | DIASTOLIC BLOOD PRESSURE: 70 MMHG | HEART RATE: 79 BPM | HEIGHT: 70 IN | TEMPERATURE: 99 F | SYSTOLIC BLOOD PRESSURE: 112 MMHG | BODY MASS INDEX: 27.94 KG/M2

## 2022-06-28 DIAGNOSIS — E11.9 TYPE 2 DIABETES MELLITUS WITHOUT COMPLICATION, WITHOUT LONG-TERM CURRENT USE OF INSULIN: ICD-10-CM

## 2022-06-28 DIAGNOSIS — Z00.00 ANNUAL PHYSICAL EXAM: Primary | ICD-10-CM

## 2022-06-28 PROCEDURE — 99999 PR PBB SHADOW E&M-EST. PATIENT-LVL III: CPT | Mod: PBBFAC,,, | Performed by: FAMILY MEDICINE

## 2022-06-28 PROCEDURE — 99999 PR PBB SHADOW E&M-EST. PATIENT-LVL III: ICD-10-PCS | Mod: PBBFAC,,, | Performed by: FAMILY MEDICINE

## 2022-06-28 PROCEDURE — 99396 PR PREVENTIVE VISIT,EST,40-64: ICD-10-PCS | Mod: S$GLB,,, | Performed by: FAMILY MEDICINE

## 2022-06-28 PROCEDURE — 99396 PREV VISIT EST AGE 40-64: CPT | Mod: S$GLB,,, | Performed by: FAMILY MEDICINE

## 2022-06-28 RX ORDER — SEMAGLUTIDE 1.34 MG/ML
1 INJECTION, SOLUTION SUBCUTANEOUS
Qty: 3 PEN | Refills: 3 | Status: SHIPPED | OUTPATIENT
Start: 2022-06-28 | End: 2022-08-31 | Stop reason: SDUPTHER
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
## 2022-06-29 ENCOUNTER — HOSPITAL ENCOUNTER (OUTPATIENT)
Dept: PREADMISSION TESTING | Facility: HOSPITAL | Age: 52
Discharge: HOME | End: 2022-06-29
Attending: INTERNAL MEDICINE
Payer: COMMERCIAL

## 2022-06-29 VITALS
OXYGEN SATURATION: 99 % | BODY MASS INDEX: 27.65 KG/M2 | TEMPERATURE: 99 F | SYSTOLIC BLOOD PRESSURE: 105 MMHG | DIASTOLIC BLOOD PRESSURE: 65 MMHG | HEART RATE: 71 BPM | HEIGHT: 70 IN | WEIGHT: 193.13 LBS | RESPIRATION RATE: 18 BRPM

## 2022-06-29 NOTE — DISCHARGE INSTRUCTIONS
Before 7 AM, enter through the Emergency Entrance..   After 7 AM enter through the Main Entrance.        Your procedure  is scheduled for _7/6/22________________.    Call [phone] between 2pm and 5pm on _7/5/22______to find out your arrival time for the day of surgery.      You may use the main entrance to the hospital on the [facility] side, or the entrance that is next to the [facility].    You may have two visitors.      You will be going to the Same Day Surgery Unit on the 2nd floor of the hospital.    Important instructions:  Do not eat anything after midnight.  You may have plain water, non carbonated.  You may also have Gatorade or Powerade after midnight.    Stop all fluids 2 hours before your surgery.    It is okay to brush your teeth.  Do not have gum, candy or mints.    SEE MEDICATION SHEET.   TAKE MEDICATIONS AS DIRECTED WITH SIPS OF WATER.    Do not take any diabetic medication on the morning of surgery unless instructed to do so by your doctor or pre op nurse.    Metformin and Synjardy must be stopped two days before your procedure.  Do not take on the day of procedure.  Resume when instructed.    Please shower the night before and the morning of your surgery.        Use Hibiclens soap as instructed by your pre op nurse.   Please place clean linens on your bed the night before surgery. Please wear fresh clean clothing after each shower.    No shaving of procedural area at least 4-5 days before surgery due to increased risk of skin irritation and/or possible infection.    Contact lenses and removable denture work may not be worn during your procedure.    You may wear deodorant only.     Do not wear powder, body lotion, perfume/cologne or make-up.    Do not wear any jewelry or have any metal on your body.    You will be asked to remove any dentures or partials for the procedure.    If you are going home on the same day of surgery, you must arrange for a family member or a friend to drive you  home.  Public transportation is prohibited.  You will not be able to drive home if you were given anesthesia or sedation.    Please leave money and valuables home.      You may bring your cell phone.    Call the doctor if fever or illness should occur before your surgery.    Call [phone] to contact us here if needed.

## 2022-07-06 ENCOUNTER — HOSPITAL ENCOUNTER (OUTPATIENT)
Facility: HOSPITAL | Age: 52
Discharge: HOME | End: 2022-07-06
Attending: INTERNAL MEDICINE | Admitting: INTERNAL MEDICINE
Payer: COMMERCIAL

## 2022-07-06 VITALS
WEIGHT: 193.13 LBS | HEART RATE: 80 BPM | BODY MASS INDEX: 27.71 KG/M2 | TEMPERATURE: 98 F | DIASTOLIC BLOOD PRESSURE: 66 MMHG | SYSTOLIC BLOOD PRESSURE: 108 MMHG | RESPIRATION RATE: 15 BRPM | OXYGEN SATURATION: 100 %

## 2022-07-06 DIAGNOSIS — I42.9 CARDIOMYOPATHY: ICD-10-CM

## 2022-07-06 DIAGNOSIS — I25.10 CORONARY ARTERY DISEASE, UNSPECIFIED VESSEL OR LESION TYPE, UNSPECIFIED WHETHER ANGINA PRESENT, UNSPECIFIED WHETHER NATIVE OR TRANSPLANTED HEART: ICD-10-CM

## 2022-07-06 DIAGNOSIS — E11.9 DIABETES MELLITUS TYPE 2 WITHOUT RETINOPATHY: Primary | ICD-10-CM

## 2022-07-06 LAB — POCT GLUCOSE: 229 MG/DL (ref 70–110)

## 2022-07-06 PROCEDURE — 63600175 PHARM REV CODE 636 W HCPCS: Performed by: INTERNAL MEDICINE

## 2022-07-06 PROCEDURE — 99152 MOD SED SAME PHYS/QHP 5/>YRS: CPT | Performed by: INTERNAL MEDICINE

## 2022-07-06 PROCEDURE — 93458 L HRT ARTERY/VENTRICLE ANGIO: CPT | Mod: 26,,, | Performed by: INTERNAL MEDICINE

## 2022-07-06 PROCEDURE — 93458 L HRT ARTERY/VENTRICLE ANGIO: CPT | Performed by: INTERNAL MEDICINE

## 2022-07-06 PROCEDURE — 93458 PR CATH PLACE/CORON ANGIO, IMG SUPER/INTERP,W LEFT HEART VENTRICULOGRAPHY: ICD-10-PCS | Mod: 26,,, | Performed by: INTERNAL MEDICINE

## 2022-07-06 PROCEDURE — C1887 CATHETER, GUIDING: HCPCS | Performed by: INTERNAL MEDICINE

## 2022-07-06 PROCEDURE — 82962 GLUCOSE BLOOD TEST: CPT | Performed by: INTERNAL MEDICINE

## 2022-07-06 PROCEDURE — 25500020 PHARM REV CODE 255: Performed by: INTERNAL MEDICINE

## 2022-07-06 PROCEDURE — 99152 MOD SED SAME PHYS/QHP 5/>YRS: CPT | Mod: ,,, | Performed by: INTERNAL MEDICINE

## 2022-07-06 PROCEDURE — C1769 GUIDE WIRE: HCPCS | Performed by: INTERNAL MEDICINE

## 2022-07-06 PROCEDURE — C1894 INTRO/SHEATH, NON-LASER: HCPCS | Performed by: INTERNAL MEDICINE

## 2022-07-06 PROCEDURE — 25000003 PHARM REV CODE 250: Performed by: INTERNAL MEDICINE

## 2022-07-06 PROCEDURE — 99152 PR MOD CONSCIOUS SEDATION, SAME PHYS, 5+ YRS, FIRST 15 MIN: ICD-10-PCS | Mod: ,,, | Performed by: INTERNAL MEDICINE

## 2022-07-06 RX ORDER — OXYCODONE HYDROCHLORIDE 5 MG/1
5 TABLET ORAL EVERY 4 HOURS PRN
Status: DISCONTINUED | OUTPATIENT
Start: 2022-07-06 | End: 2022-07-06 | Stop reason: HOSPADM

## 2022-07-06 RX ORDER — LIDOCAINE HYDROCHLORIDE 10 MG/ML
INJECTION, SOLUTION EPIDURAL; INFILTRATION; INTRACAUDAL; PERINEURAL
Status: DISCONTINUED | OUTPATIENT
Start: 2022-07-06 | End: 2022-07-06 | Stop reason: HOSPADM

## 2022-07-06 RX ORDER — ONDANSETRON 2 MG/ML
4 INJECTION INTRAMUSCULAR; INTRAVENOUS EVERY 12 HOURS PRN
Status: DISCONTINUED | OUTPATIENT
Start: 2022-07-06 | End: 2022-07-06

## 2022-07-06 RX ORDER — MIDAZOLAM HYDROCHLORIDE 1 MG/ML
INJECTION, SOLUTION INTRAMUSCULAR; INTRAVENOUS
Status: DISCONTINUED | OUTPATIENT
Start: 2022-07-06 | End: 2022-07-06 | Stop reason: HOSPADM

## 2022-07-06 RX ORDER — VERAPAMIL HYDROCHLORIDE 2.5 MG/ML
INJECTION, SOLUTION INTRAVENOUS
Status: DISCONTINUED | OUTPATIENT
Start: 2022-07-06 | End: 2022-07-06 | Stop reason: HOSPADM

## 2022-07-06 RX ORDER — HEPARIN SODIUM 1000 [USP'U]/ML
INJECTION, SOLUTION INTRAVENOUS; SUBCUTANEOUS
Status: DISCONTINUED | OUTPATIENT
Start: 2022-07-06 | End: 2022-07-06 | Stop reason: HOSPADM

## 2022-07-06 RX ORDER — ONDANSETRON 8 MG/1
8 TABLET, ORALLY DISINTEGRATING ORAL EVERY 8 HOURS PRN
Status: DISCONTINUED | OUTPATIENT
Start: 2022-07-06 | End: 2022-07-06 | Stop reason: HOSPADM

## 2022-07-06 RX ORDER — NITROGLYCERIN 20 MG/100ML
INJECTION INTRAVENOUS
Status: DISCONTINUED | OUTPATIENT
Start: 2022-07-06 | End: 2022-07-06 | Stop reason: HOSPADM

## 2022-07-06 RX ORDER — ACETAMINOPHEN 325 MG/1
650 TABLET ORAL EVERY 4 HOURS PRN
Status: DISCONTINUED | OUTPATIENT
Start: 2022-07-06 | End: 2022-07-06 | Stop reason: HOSPADM

## 2022-07-06 RX ORDER — CLOPIDOGREL 300 MG/1
600 TABLET, FILM COATED ORAL ONCE
Status: COMPLETED | OUTPATIENT
Start: 2022-07-06 | End: 2022-07-06

## 2022-07-06 RX ORDER — SODIUM CHLORIDE 9 MG/ML
INJECTION, SOLUTION INTRAVENOUS CONTINUOUS
Status: DISCONTINUED | OUTPATIENT
Start: 2022-07-06 | End: 2022-07-06 | Stop reason: HOSPADM

## 2022-07-06 RX ORDER — ACETAMINOPHEN 325 MG/1
650 TABLET ORAL EVERY 4 HOURS PRN
Status: DISCONTINUED | OUTPATIENT
Start: 2022-07-06 | End: 2022-07-06

## 2022-07-06 RX ORDER — NAPROXEN SODIUM 220 MG/1
81 TABLET, FILM COATED ORAL ONCE
Status: COMPLETED | OUTPATIENT
Start: 2022-07-06 | End: 2022-07-06

## 2022-07-06 RX ORDER — SODIUM CHLORIDE 9 MG/ML
INJECTION, SOLUTION INTRAVENOUS CONTINUOUS
Status: DISCONTINUED | OUTPATIENT
Start: 2022-07-06 | End: 2022-07-06

## 2022-07-06 RX ORDER — DIPHENHYDRAMINE HCL 25 MG
50 CAPSULE ORAL ONCE
Status: COMPLETED | OUTPATIENT
Start: 2022-07-06 | End: 2022-07-06

## 2022-07-06 RX ORDER — FENTANYL CITRATE 50 UG/ML
INJECTION, SOLUTION INTRAMUSCULAR; INTRAVENOUS
Status: DISCONTINUED | OUTPATIENT
Start: 2022-07-06 | End: 2022-07-06 | Stop reason: HOSPADM

## 2022-07-06 RX ORDER — MORPHINE SULFATE 4 MG/ML
3 INJECTION, SOLUTION INTRAMUSCULAR; INTRAVENOUS
Status: DISCONTINUED | OUTPATIENT
Start: 2022-07-06 | End: 2022-07-06 | Stop reason: HOSPADM

## 2022-07-06 RX ADMIN — ASPIRIN 81 MG CHEWABLE TABLET 81 MG: 81 TABLET CHEWABLE at 06:07

## 2022-07-06 RX ADMIN — DIPHENHYDRAMINE HYDROCHLORIDE 50 MG: 25 CAPSULE ORAL at 06:07

## 2022-07-06 RX ADMIN — CLOPIDOGREL BISULFATE 600 MG: 300 TABLET, FILM COATED ORAL at 06:07

## 2022-07-06 RX ADMIN — SODIUM CHLORIDE: 0.9 INJECTION, SOLUTION INTRAVENOUS at 06:07

## 2022-07-06 NOTE — H&P
Jaswinder Valle is a 52 y.o. male who presents for cardiovascular risk assessment       HISTORY OF PRESENT ILLNESS:      Patient is a pleasant 49-year-old man who was recently diagnosed with diabetes.  States that he has a very active person and denies any chest pains at rest on exertion, orthopnea, PND.  States that he has to climb multiple flights of stair at has never had any issues with that.  Wants to optimize his cardiovascular risk     Notes from October 2019:  Patient here for follow-up today.  States that he does not want to take Lipitor and never even started..  He wants to changes diet and exercise and lifestyle patterns and see how that affects his diabetes and his lipid profile.  We spent a considerable amount of time talking about the data behind diet and exercise as well as Lipitor and diabetes.  Patient states that he understands and would try lifestyle modifications and agreed for rechecking lipid profile in 6 months.        May 22:  Patient here for follow-up after a long hiatus.  Denies any chest pains at rest on exertion, orthopnea, PND.  Concerned about the condition of his heart because uncontrolled diabetes as well as dyslipidemia.  Still refusing to take statins.        Notes from May 2022:  Patient here for follow-up.  Asymptomatic.  Stress test abnormal.  EF 45%.  Stress EKG abnormal.     · The ECG portion of this study is positive for myocardial ischemia.  · The stress echo portion of this study is negative for myocardial ischemia.  · Mildly decreased systolic function.  · The estimated ejection fraction is 45%.  · The patient's exercise capacity was normal.  · The test was stopped because the patient experienced fatigue.  · There were no arrhythmias during stress.           PAST MEDICAL HISTORY:           Past Medical History:   Diagnosis Date    Diabetes mellitus type 2 without retinopathy 6/30/2016    Diabetes mellitus, type 2      Diabetes type 2, uncontrolled 6/29/2016    Nuclear  sclerosis of both eyes 6/30/2016         PAST SURGICAL HISTORY:            Past Surgical History:   Procedure Laterality Date    COLONOSCOPY N/A 5/16/2018     Procedure: COLONOSCOPY;  Surgeon: Jovany Mendiola MD;  Location: [facility];  Service: Endoscopy;  Laterality: N/A;         ALLERGIES AND MEDICATION:           Review of patient's allergies indicates:   Allergen Reactions    No known drug allergies            Medication List            Accurate as of May 26, 2022  9:08 AM. If you have any questions, ask your nurse or doctor.              CONTINUE taking these medications    metFORMIN 500 MG ER 24hr tablet  Commonly known as: GLUCOPHAGE-XR  Take 1 tablet by mouth once daily      tadalafiL 20 MG Tab  Commonly known as: CIALIS  Take 1 tablet (20 mg total) by mouth once daily. Take 1 hour before intercourse      TRULICITY 0.75 mg/0.5 mL pen injector  Generic drug: dulaglutide  INJECT 1 SYRINGE (0.75 MG)  SUBCUTANEOUSLY ONCE A WEEK                SOCIAL HISTORY:      Social History         Socioeconomic History    Marital status: Single   Tobacco Use    Smoking status: Never Smoker    Smokeless tobacco: Never Used   Substance and Sexual Activity    Alcohol use: Yes    Drug use: No    Sexual activity: Yes       Partners: Female            FAMILY HISTORY:            Family History   Problem Relation Age of Onset    Cataracts Mother      Diabetes Mother      No Known Problems Father      No Known Problems Sister      No Known Problems Brother      No Known Problems Maternal Aunt      No Known Problems Maternal Uncle      No Known Problems Paternal Aunt      No Known Problems Paternal Uncle      No Known Problems Maternal Grandmother      No Known Problems Maternal Grandfather      No Known Problems Paternal Grandmother      No Known Problems Paternal Grandfather      Amblyopia Neg Hx      Blindness Neg Hx      Cancer Neg Hx      Hypertension Neg Hx      Macular degeneration Neg Hx       "Retinal detachment Neg Hx      Strabismus Neg Hx      Stroke Neg Hx      Thyroid disease Neg Hx      Glaucoma Neg Hx           REVIEW OF SYSTEMS:   Review of Systems   Constitutional: Negative.    HENT: Negative.    Eyes: Negative.    Respiratory: Negative.    Cardiovascular: Negative.    Gastrointestinal: Negative.    Genitourinary: Negative.    Musculoskeletal: Negative.    Skin: Negative.    Neurological: Negative.    Endo/Heme/Allergies: Negative.    Psychiatric/Behavioral: Negative.          A 10 point review of systems was performed and all the pertinent positives have been mentioned. Rest of review of systems was negative.           PHYSICAL EXAM:          Vitals:     05/26/22 0857   BP: 136/78   Pulse: 78   Resp: 15    Body mass index is 27.52 kg/m².  Weight: 87 kg (191 lb 12.8 oz)   Height: 5' 10" (177.8 cm)      Physical Exam   Constitutional: He is oriented to person, place, and time. He appears well-developed and well-nourished. He is active.   HENT:   Head: Normocephalic and atraumatic.   Right Ear: Hearing normal.   Left Ear: Hearing normal.   Nose: Nose normal.   Mouth/Throat: Mucous membranes are normal.   Eyes: Pupils are equal, round, and reactive to light. Conjunctivae and lids are normal.   Neck: Normal range of motion. Neck supple.   Cardiovascular: Normal rate, regular rhythm, normal heart sounds, intact distal pulses and normal pulses.   Pulmonary/Chest: Effort normal and breath sounds normal.   Abdominal: Soft. Normal appearance. There is no tenderness.   Musculoskeletal: He exhibits no edema or deformity.   Neurological: He is alert and oriented to person, place, and time.   Skin: Skin is warm, dry and intact.   Psychiatric: He has a normal mood and affect. His speech is normal.   Nursing note and vitals reviewed.           DATA:      Laboratory:  CBC:         CHEMISTRIES:      Recent Labs   Lab 01/20/21  1312   Glucose 129 H   Sodium 138   Potassium 3.9   BUN 17   Creatinine 1.2   eGFR " if  >60.0   eGFR if non African American >60.0   Calcium 9.2         CARDIAC BIOMARKERS:         COAGS:         LIPIDS/LFTS:       Recent Labs   Lab 10/02/19  1509 01/20/21  1312   Cholesterol  --  210 H   Triglycerides  --  77   HDL  --  52   LDL Cholesterol  --  142.6   Non-HDL Cholesterol  --  158   AST 19 31   ALT 25 36                 Hemoglobin A1C   Date Value Ref Range Status   04/23/2021 6.7 (H) 4.0 - 5.6 % Final       Comment:       ADA Screening Guidelines:  5.7-6.4%  Consistent with prediabetes  >or=6.5%  Consistent with diabetes     High levels of fetal hemoglobin interfere with the HbA1C  assay. Heterozygous hemoglobin variants (HbS, HgC, etc)do  not significantly interfere with this assay.   However, presence of multiple variants may affect accuracy.      01/20/2021 9.3 (H) 4.0 - 5.6 % Final       Comment:       ADA Screening Guidelines:  5.7-6.4%  Consistent with prediabetes  >or=6.5%  Consistent with diabetes  High levels of fetal hemoglobin interfere with the HbA1C  assay. Heterozygous hemoglobin variants (HbS, HgC, etc)do  not significantly interfere with this assay.   However, presence of multiple variants may affect accuracy.      02/13/2020 8.6 (H) 4.8 - 5.6 % Final       Comment:       [facility]   09/04/2019 8.2 (H) 4.0 - 5.6 % Final       Comment:       ADA Screening Guidelines:  5.7-6.4%  Consistent with prediabetes  >or=6.5%  Consistent with diabetes  High levels of fetal hemoglobin interfere with the HbA1C  assay. Heterozygous hemoglobin variants (HbS, HgC, etc)do  not significantly interfere with this assay.   However, presence of multiple variants may affect accuracy.            TSH         The 10-year ASCVD risk score (Myrtledaniel GALLEGOS JrDav, et al., 2013) is: 13.1%    Values used to calculate the score:      Age: 52 years      Sex: Male      Is Non- : Yes      Diabetic: Yes      Tobacco smoker: No      Systolic Blood Pressure: 136 mmHg      Is BP treated:  No      HDL Cholesterol: 52 mg/dL      Total Cholesterol: 210 mg/dL               Cardiovascular Testing:              ASSESSMENT AND PLAN          Patient Active Problem List   Diagnosis    Diabetes mellitus type 2 without retinopathy    Nuclear sclerosis, bilateral    Refractive error    Type 2 diabetes mellitus without complication, without long-term current use of insulin    Erectile dysfunction    BPH with urinary obstruction         1.  Cardiovascular risk optimization.  ASCVD risk 11.9%.  Considering his diabetes and his ASCVD risk, recommended starting Lipitor.  Patient agreeable.  Initiate Lipitor check LFTs in 6 weeks     2. Cardiomyopathy with EF of 45% and abnormal stress EKG.  Stress echo portion was normal.  Because of EF of 45% and abnormal stress EKG, will proceed with coronary angiogram to find underlying etiology of drop in since his echo in 2019. I have asked patient to start taking aspirin 81 mg daily as well as losartan 25 mg daily.  Will initiate Toprol-XL at next visit.  Check BMP in 1 week.        Risks, benefits and alternatives of the catheterization procedure were discussed with the patient.The risks of coronary angiography include but are not limited to: bleeding, infection, death, heart attack, arrhythmia, kidney injury or failure, potential need for dialysis, allergic reactions, stroke, need for emergency surgery, hematoma, pseudoaneurysm etc.  Should stenting be indicated, the patient has agreed to dual anti-platelet therapy for 1-consecutive year with a drug-eluting stent and a minimum of 1-month with the use of a bare metal stent. Additionally, pt is aware that non-compliance is likely to result in stent clotting with heart attack, heart failure, and/or death  The risks of moderate sedation include hypotension, respiratory depression, arrhythmias, bronchospasm, and death. Informed consent was obtained and the  patient is agreeable to proceed with the procedure. Consent was  placed on the chart.               Thank you very much for involving me in the care of your patient.  Please do not hesitate to contact me if there are any questions.       Km Patel MD, FACC, [facility]  Interventional Cardiologist, [facility].

## 2022-07-06 NOTE — Clinical Note
36 ml of contrast were injected throughout the case. 24 mL of contrast was the total wasted during the case. 60 mL was the total amount used during the case.

## 2022-07-06 NOTE — DISCHARGE INSTRUCTIONS
Limit movement of the wrist.  Do not bend wrist or perform heavy lifting for 24 hours. Wear the splint for 24 hours.      NO blood pressure cuff or venipuncture to affected arm for 24 hours.    If bleeding occurs, apply pressure to site for 20 minutes. Apply band aid once bleeding stops.  Call 911 if unable to stop bleeding with pressure.     If a lump starts to develop under the skin at the site, apply pressure for 20 minutes. Apply bandage once lump stops developing or starts to dissipate. Call 911 if lump (hematoma) continues to grow.     Keep your follow up appointment.      Do not drive, drink alcohol, or sign legal documents for 24 hours, or if taking narcotic pain medication.       Fall Prevention  Millions of people fall every year and injure themselves. You may have had anesthesia or sedation which may increase your risk of falling. You may have health issues that put you at an increased risk of falling.     Here are ways to reduce your risk of falling.    Make your home safe by keeping walkways clear of objects you may trip over.  Use non-slip pads under rugs. Do not use area rugs or small throw rugs.  Use non-slip mats in bathtubs and showers.  Install handrails and lights on staircases.  Do not walk in poorly lit areas.  Do not stand on chairs or wobbly ladders.  Use caution when reaching overhead or looking upward. This position can cause a loss of balance.  Be sure your shoes fit properly, have non-slip bottoms and are in good condition.   Wear shoes both inside and out. Avoid going barefoot or wearing slippers.  Be cautious when going up and down stairs, curbs, and when walking on uneven sidewalks.  If your balance is poor, consider using a cane or walker.  If your fall was related to alcohol use, stop or limit alcohol intake.   If your fall was related to use of sleeping medicines, talk to your doctor about this. You may need to reduce your dosage at bedtime if you awaken during the night to go  to the bathroom.    To reduce the need for nighttime bathroom trips:  Avoid drinking fluids for several hours before going to bed  Empty your bladder before going to bed  Men can keep a urinal at the bedside  Stay as active as you can. Balance, flexibility, strength, and endurance all come from exercise. They all play a role in preventing falls. Ask your healthcare provider which types of activity are right for you.  Get your vision checked on a regular basis.  If you have pets, know where they are before you stand up or walk so you don't trip over them.  Use night lights.

## 2022-07-07 ENCOUNTER — PATIENT MESSAGE (OUTPATIENT)
Dept: CARDIOLOGY | Facility: CLINIC | Age: 52
End: 2022-07-07
Payer: COMMERCIAL

## 2022-07-07 NOTE — TELEPHONE ENCOUNTER
----- Message from Saray Serna sent at 7/7/2022 10:16 AM CDT -----  Regarding: self [phone]  Type: Patient Call Back    Who called: self    What is the request in detail: patient needs a light duty work permission slip (would like it to state hes on light duty until next Tues). Pt would like it emailed, pt verified email on file.     Can the clinic reply by [facility]TNOI[facility]IYAD? no    Would the patient rather a call back or a response via [facility]?  Call back    Best call back number:[phone]

## 2022-07-07 NOTE — TELEPHONE ENCOUNTER
----- Message from Melissa Amanda sent at 7/7/2022  3:16 PM CDT -----  Regarding: Self/  [phone]  Type: Patient Call Back    Who called:  Patient    What is the request in detail:  Patient is needing a note for work that he's able to do light duty work.  He's needing it dated from 7/8 to 7/12.   He's asking for the note to go to his MY Chart.  Thank you    Would the patient rather a call back or a response via [facility]?  Call back    Best call back number: [phone]          Thank you

## 2022-07-13 DIAGNOSIS — E11.9 TYPE 2 DIABETES MELLITUS WITHOUT COMPLICATION: ICD-10-CM

## 2022-07-21 ENCOUNTER — OFFICE VISIT (OUTPATIENT)
Dept: CARDIOLOGY | Facility: CLINIC | Age: 52
End: 2022-07-21
Payer: COMMERCIAL

## 2022-07-21 VITALS
WEIGHT: 194.44 LBS | BODY MASS INDEX: 27.84 KG/M2 | RESPIRATION RATE: 18 BRPM | HEART RATE: 76 BPM | SYSTOLIC BLOOD PRESSURE: 117 MMHG | OXYGEN SATURATION: 100 % | DIASTOLIC BLOOD PRESSURE: 71 MMHG | HEIGHT: 70 IN

## 2022-07-21 DIAGNOSIS — H52.7 REFRACTIVE ERROR: ICD-10-CM

## 2022-07-21 DIAGNOSIS — N52.9 ERECTILE DYSFUNCTION, UNSPECIFIED ERECTILE DYSFUNCTION TYPE: ICD-10-CM

## 2022-07-21 DIAGNOSIS — H25.13 NUCLEAR SCLEROSIS, BILATERAL: ICD-10-CM

## 2022-07-21 DIAGNOSIS — I50.9 CONGESTIVE HEART FAILURE, UNSPECIFIED HF CHRONICITY, UNSPECIFIED HEART FAILURE TYPE: Primary | ICD-10-CM

## 2022-07-21 DIAGNOSIS — E11.9 DIABETES MELLITUS TYPE 2 WITHOUT RETINOPATHY: ICD-10-CM

## 2022-07-21 DIAGNOSIS — E11.9 TYPE 2 DIABETES MELLITUS WITHOUT COMPLICATION, WITHOUT LONG-TERM CURRENT USE OF INSULIN: ICD-10-CM

## 2022-07-21 PROCEDURE — 99214 PR OFFICE/OUTPT VISIT, EST, LEVL IV, 30-39 MIN: ICD-10-PCS | Mod: S$GLB,,, | Performed by: INTERNAL MEDICINE

## 2022-07-21 PROCEDURE — 99999 PR PBB SHADOW E&M-EST. PATIENT-LVL IV: CPT | Mod: PBBFAC,,, | Performed by: INTERNAL MEDICINE

## 2022-07-21 PROCEDURE — 99999 PR PBB SHADOW E&M-EST. PATIENT-LVL IV: ICD-10-PCS | Mod: PBBFAC,,, | Performed by: INTERNAL MEDICINE

## 2022-07-21 PROCEDURE — 99214 OFFICE O/P EST MOD 30 MIN: CPT | Mod: S$GLB,,, | Performed by: INTERNAL MEDICINE

## 2022-07-21 NOTE — PROGRESS NOTES
Jaswinder Valle is a 52 y.o. male who presents for cardiovascular risk assessment       HISTORY OF PRESENT ILLNESS:      Patient is a pleasant 49-year-old man who was recently diagnosed with diabetes.  States that he has a very active person and denies any chest pains at rest on exertion, orthopnea, PND.  States that he has to climb multiple flights of stair at has never had any issues with that.  Wants to optimize his cardiovascular risk     Notes from October 2019:  Patient here for follow-up today.  States that he does not want to take Lipitor and never even started..  He wants to changes diet and exercise and lifestyle patterns and see how that affects his diabetes and his lipid profile.  We spent a considerable amount of time talking about the data behind diet and exercise as well as Lipitor and diabetes.  Patient states that he understands and would try lifestyle modifications and agreed for rechecking lipid profile in 6 months.        May 22:  Patient here for follow-up after a long hiatus.  Denies any chest pains at rest on exertion, orthopnea, PND.  Concerned about the condition of his heart because uncontrolled diabetes as well as dyslipidemia.  Still refusing to take statins.        Notes from May 2022:  Patient here for follow-up.  Asymptomatic.  Stress test abnormal.  EF 45%.  Stress EKG abnormal.     · The ECG portion of this study is positive for myocardial ischemia.  · The stress echo portion of this study is negative for myocardial ischemia.  · Mildly decreased systolic function.  · The estimated ejection fraction is 45%.  · The patient's exercise capacity was normal.  · The test was stopped because the patient experienced fatigue.  · There were no arrhythmias during stress.        July 22: doing fine. No cp, orthopnea, pnd      ·  The pre-procedure left ventricular end diastolic pressure was 4.  · The estimated blood loss was none.  · There was non-obstructive coronary artery disease..     LEFT  MAIN: NO SIGNIFICANT STENOSIS     LAD: PROX 10-20% STENOSIS     LCX: 10% STENOSIS     RCA: PROX 30%  STENOSIS     ACCESS: We accessed the right radial artery using ultrasound guidance. The vessel appeared widely patent, suitable for endovascular access. The images were interpreted by me and saved.  Access was closed with radial band.         PAST MEDICAL HISTORY:           Past Medical History:   Diagnosis Date    Diabetes mellitus type 2 without retinopathy 6/30/2016    Diabetes mellitus, type 2      Diabetes type 2, uncontrolled 6/29/2016    Nuclear sclerosis of both eyes 6/30/2016         PAST SURGICAL HISTORY:            Past Surgical History:   Procedure Laterality Date    COLONOSCOPY N/A 5/16/2018     Procedure: COLONOSCOPY;  Surgeon: Jovany Mendiola MD;  Location: [facility];  Service: Endoscopy;  Laterality: N/A;         ALLERGIES AND MEDICATION:           Review of patient's allergies indicates:   Allergen Reactions    No known drug allergies            Medication List            Accurate as of May 26, 2022  9:08 AM. If you have any questions, ask your nurse or doctor.              CONTINUE taking these medications    metFORMIN 500 MG ER 24hr tablet  Commonly known as: GLUCOPHAGE-XR  Take 1 tablet by mouth once daily      tadalafiL 20 MG Tab  Commonly known as: CIALIS  Take 1 tablet (20 mg total) by mouth once daily. Take 1 hour before intercourse      TRULICITY 0.75 mg/0.5 mL pen injector  Generic drug: dulaglutide  INJECT 1 SYRINGE (0.75 MG)  SUBCUTANEOUSLY ONCE A WEEK                SOCIAL HISTORY:      Social History               Socioeconomic History    Marital status: Single   Tobacco Use    Smoking status: Never Smoker    Smokeless tobacco: Never Used   Substance and Sexual Activity    Alcohol use: Yes    Drug use: No    Sexual activity: Yes       Partners: Female            FAMILY HISTORY:            Family History   Problem Relation Age of Onset    Cataracts Mother      Diabetes  "Mother      No Known Problems Father      No Known Problems Sister      No Known Problems Brother      No Known Problems Maternal Aunt      No Known Problems Maternal Uncle      No Known Problems Paternal Aunt      No Known Problems Paternal Uncle      No Known Problems Maternal Grandmother      No Known Problems Maternal Grandfather      No Known Problems Paternal Grandmother      No Known Problems Paternal Grandfather      Amblyopia Neg Hx      Blindness Neg Hx      Cancer Neg Hx      Hypertension Neg Hx      Macular degeneration Neg Hx      Retinal detachment Neg Hx      Strabismus Neg Hx      Stroke Neg Hx      Thyroid disease Neg Hx      Glaucoma Neg Hx           REVIEW OF SYSTEMS:   Review of Systems   Constitutional: Negative.    HENT: Negative.    Eyes: Negative.    Respiratory: Negative.    Cardiovascular: Negative.    Gastrointestinal: Negative.    Genitourinary: Negative.    Musculoskeletal: Negative.    Skin: Negative.    Neurological: Negative.    Endo/Heme/Allergies: Negative.    Psychiatric/Behavioral: Negative.          A 10 point review of systems was performed and all the pertinent positives have been mentioned. Rest of review of systems was negative.           PHYSICAL EXAM:          Vitals:     05/26/22 0857   BP: 136/78   Pulse: 78   Resp: 15    Body mass index is 27.52 kg/m².  Weight: 87 kg (191 lb 12.8 oz)   Height: 5' 10" (177.8 cm)      Physical Exam   Constitutional: He is oriented to person, place, and time. He appears well-developed and well-nourished. He is active.   HENT:   Head: Normocephalic and atraumatic.   Right Ear: Hearing normal.   Left Ear: Hearing normal.   Nose: Nose normal.   Mouth/Throat: Mucous membranes are normal.   Eyes: Pupils are equal, round, and reactive to light. Conjunctivae and lids are normal.   Neck: Normal range of motion. Neck supple.   Cardiovascular: Normal rate, regular rhythm, normal heart sounds, intact distal pulses and normal " pulses.   Pulmonary/Chest: Effort normal and breath sounds normal.   Abdominal: Soft. Normal appearance. There is no tenderness.   Musculoskeletal: He exhibits no edema or deformity.   Neurological: He is alert and oriented to person, place, and time.   Skin: Skin is warm, dry and intact.   Psychiatric: He has a normal mood and affect. His speech is normal.   Nursing note and vitals reviewed.           DATA:      Laboratory:  CBC:         CHEMISTRIES:      Recent Labs   Lab 01/20/21  1312   Glucose 129 H   Sodium 138   Potassium 3.9   BUN 17   Creatinine 1.2   eGFR if African American >60.0   eGFR if non African American >60.0   Calcium 9.2         CARDIAC BIOMARKERS:         COAGS:         LIPIDS/LFTS:       Recent Labs   Lab 10/02/19  1509 01/20/21  1312   Cholesterol  --  210 H   Triglycerides  --  77   HDL  --  52   LDL Cholesterol  --  142.6   Non-HDL Cholesterol  --  158   AST 19 31   ALT 25 36                 Hemoglobin A1C   Date Value Ref Range Status   04/23/2021 6.7 (H) 4.0 - 5.6 % Final       Comment:       ADA Screening Guidelines:  5.7-6.4%  Consistent with prediabetes  >or=6.5%  Consistent with diabetes     High levels of fetal hemoglobin interfere with the HbA1C  assay. Heterozygous hemoglobin variants (HbS, HgC, etc)do  not significantly interfere with this assay.   However, presence of multiple variants may affect accuracy.      01/20/2021 9.3 (H) 4.0 - 5.6 % Final       Comment:       ADA Screening Guidelines:  5.7-6.4%  Consistent with prediabetes  >or=6.5%  Consistent with diabetes  High levels of fetal hemoglobin interfere with the HbA1C  assay. Heterozygous hemoglobin variants (HbS, HgC, etc)do  not significantly interfere with this assay.   However, presence of multiple variants may affect accuracy.      02/13/2020 8.6 (H) 4.8 - 5.6 % Final       Comment:       [facility]   09/04/2019 8.2 (H) 4.0 - 5.6 % Final       Comment:       ADA Screening Guidelines:  5.7-6.4%  Consistent with  prediabetes  >or=6.5%  Consistent with diabetes  High levels of fetal hemoglobin interfere with the HbA1C  assay. Heterozygous hemoglobin variants (HbS, HgC, etc)do  not significantly interfere with this assay.   However, presence of multiple variants may affect accuracy.            TSH         The 10-year ASCVD risk score (Myrtle GALLEGOS Jr., et al., 2013) is: 13.1%    Values used to calculate the score:      Age: 52 years      Sex: Male      Is Non- : Yes      Diabetic: Yes      Tobacco smoker: No      Systolic Blood Pressure: 136 mmHg      Is BP treated: No      HDL Cholesterol: 52 mg/dL      Total Cholesterol: 210 mg/dL               Cardiovascular Testing:              ASSESSMENT AND PLAN          Patient Active Problem List   Diagnosis    Diabetes mellitus type 2 without retinopathy    Nuclear sclerosis, bilateral    Refractive error    Type 2 diabetes mellitus without complication, without long-term current use of insulin    Erectile dysfunction    BPH with urinary obstruction         1.  Cardiovascular risk optimization.  ASCVD risk 11.9%.  Considering his diabetes and his ASCVD risk, recommended starting Lipitor.  Patient agreeable.  Initiate Lipitor check LFTs in 6 weeks     2. Cardiomyopathy with EF of 45% and abnormal stress EKG.  Stress echo portion was normal.  Because of EF of 45% and abnormal stress EKG, will proceed with coronary angiogram to find underlying etiology of drop in since his echo in 2019. I have asked patient to start taking aspirin 81 mg daily as well as losartan 25 mg daily. Subsequent angiogram did not reveal any significant coronary artery stenosis. Patient refused toprol xl at the current time. Would like to check an echo in 2-3 months and consider toprol xl if lvef still low.            Thank you very much for involving me in the care of your patient.  Please do not hesitate to contact me if there are any questions.       Km Patel MD, FACC,  FSCAI  Interventional Cardiologist, [facility].

## 2022-07-25 NOTE — DISCHARGE SUMMARY
[facility] - Cath Lab  Discharge Note  Short Stay    Procedure(s) (LRB):  Left heart cath (Left)    OUTCOME: Patient tolerated treatment/procedure well without complication and is now ready for discharge.    DISPOSITION: Home or Self Care    FINAL DIAGNOSIS:  <principal problem not specified>    FOLLOWUP: In clinic    DISCHARGE INSTRUCTIONS:    Discharge Procedure Orders   Diet Cardiac         Clinical Reference Documents Added to Patient Instructions       Document    CARDIAC CATHETERIZATION (ENGLISH)    CARDIAC CATHETERIZATION DISCHARGE INSTRUCTIONS (ENGLISH)          TIME SPENT ON DISCHARGE: 35 minutes

## 2022-07-26 ENCOUNTER — OFFICE VISIT (OUTPATIENT)
Dept: OPTOMETRY | Facility: CLINIC | Age: 52
End: 2022-07-26
Payer: COMMERCIAL

## 2022-07-26 DIAGNOSIS — E11.9 TYPE 2 DIABETES MELLITUS WITHOUT RETINOPATHY: Primary | ICD-10-CM

## 2022-07-26 DIAGNOSIS — H52.03 HYPEROPIA WITH ASTIGMATISM AND PRESBYOPIA, BILATERAL: ICD-10-CM

## 2022-07-26 DIAGNOSIS — H52.203 HYPEROPIA WITH ASTIGMATISM AND PRESBYOPIA, BILATERAL: ICD-10-CM

## 2022-07-26 DIAGNOSIS — H52.4 HYPEROPIA WITH ASTIGMATISM AND PRESBYOPIA, BILATERAL: ICD-10-CM

## 2022-07-26 PROCEDURE — 99999 PR PBB SHADOW E&M-EST. PATIENT-LVL II: ICD-10-PCS | Mod: PBBFAC,,, | Performed by: OPTOMETRIST

## 2022-07-26 PROCEDURE — 92014 COMPRE OPH EXAM EST PT 1/>: CPT | Mod: S$GLB,,, | Performed by: OPTOMETRIST

## 2022-07-26 PROCEDURE — 92014 PR EYE EXAM, EST PATIENT,COMPREHESV: ICD-10-PCS | Mod: S$GLB,,, | Performed by: OPTOMETRIST

## 2022-07-26 PROCEDURE — 92015 DETERMINE REFRACTIVE STATE: CPT | Mod: S$GLB,,, | Performed by: OPTOMETRIST

## 2022-07-26 PROCEDURE — 99999 PR PBB SHADOW E&M-EST. PATIENT-LVL II: CPT | Mod: PBBFAC,,, | Performed by: OPTOMETRIST

## 2022-07-26 PROCEDURE — 92015 PR REFRACTION: ICD-10-PCS | Mod: S$GLB,,, | Performed by: OPTOMETRIST

## 2022-07-26 NOTE — PROGRESS NOTES
Subjective:       Patient ID: Jaswinder Valle is a 52 y.o. male      Chief Complaint   Patient presents with    Concerns About Ocular Health    Diabetic Eye Exam     History of Present Illness  Dls: 7/29/21 Dr. Moss    51 y/o male presents today for diabetic eye exam.   Pt c/o blurry vision at distance ou.   Pt c/o problems with glare at night   Pt wear single vision glasses one for reading one for computer and one for distance.      x 2 days     + tearing  + itching  No burning  No pain  No ha's  No floaters  No flashes    Eye meds  Otc gtts ou prn     Hemoglobin A1C       Date                     Value               Ref Range             Status                06/24/2022               12.1 (H)            4.0 - 5.6 %           Final                 04/23/2021               6.7 (H)             4.0 - 5.6 %           Final                  01/20/2021               9.3 (H)             4.0 - 5.6 %           Final                    Assessment/Plan:     1. Type 2 diabetes mellitus without retinopathy  No diabetic retinopathy. Discussed with pt the effects of diabetes on vision, importance of good blood sugar control, compliance with meds, and follow up care with PCP. Return in 1 year for dilated eye exam, sooner PRN.    2. Hyperopia with astigmatism and presbyopia, bilateral  Pt advised refractive shift compared to previous Rx. Pt educated that changes in BS can cause changes in vision. Pt states BS was running high but now improving with pt back on medication. Advised pt if BS shifts more, may need new Rx later.     Educated patient on refractive error and discussed lens options. Dispensed updated spectacle Rx. Educated about adaptation period to new specs.    Eyeglass Final Rx     Eyeglass Final Rx       Sphere Cylinder Axis Add    Right +1.00 Sphere  +2.25    Left +1.25 +0.25 025 +2.25    Type: PAL/BF    Expiration Date: 7/26/2023          Eyeglass Final Rx #2       Sphere Cylinder Axis Add    Right +2.25  Sphere      Left +2.50 +0.25 025     Type: [employer] USAL computer    Expiration Date: 7/26/2023                  Follow up in about 1 year (around 7/26/2023) for Diabetic Eye Exam.

## 2022-07-27 ENCOUNTER — PATIENT MESSAGE (OUTPATIENT)
Dept: OPTOMETRY | Facility: CLINIC | Age: 52
End: 2022-07-27
Payer: COMMERCIAL

## 2022-08-31 DIAGNOSIS — E11.9 TYPE 2 DIABETES MELLITUS WITHOUT COMPLICATION, WITHOUT LONG-TERM CURRENT USE OF INSULIN: ICD-10-CM

## 2022-08-31 RX ORDER — SEMAGLUTIDE 1.34 MG/ML
1 INJECTION, SOLUTION SUBCUTANEOUS
Qty: 3 PEN | Refills: 3 | Status: SHIPPED | OUTPATIENT
Start: 2022-08-31 | End: 2022-09-01

## 2022-08-31 NOTE — TELEPHONE ENCOUNTER
Patient states he need his medication sent to [facility] and would like a 3 moth supply. Medication patient is referring to is semaglutide (OZEMPIC) patient states he is out of his medication and has been waiting for a couple of days. Patient is upset states he need his medication. Medication sent to provider in previous message.  Please advise

## 2022-08-31 NOTE — TELEPHONE ENCOUNTER
No new care gaps identified.  [facility] Embedded Care Gaps. Reference number: [id number]. 8/31/2022   11:32:06 AM JABARIT

## 2022-09-01 ENCOUNTER — HOSPITAL ENCOUNTER (OUTPATIENT)
Dept: CARDIOLOGY | Facility: HOSPITAL | Age: 52
Discharge: HOME | End: 2022-09-01
Attending: INTERNAL MEDICINE
Payer: COMMERCIAL

## 2022-09-01 DIAGNOSIS — E11.9 TYPE 2 DIABETES MELLITUS WITHOUT COMPLICATION, WITHOUT LONG-TERM CURRENT USE OF INSULIN: ICD-10-CM

## 2022-09-01 DIAGNOSIS — I50.9 CONGESTIVE HEART FAILURE, UNSPECIFIED HF CHRONICITY, UNSPECIFIED HEART FAILURE TYPE: ICD-10-CM

## 2022-09-01 LAB
AV INDEX (PROSTH): 0.83
AV MEAN GRADIENT: 3 MMHG
AV PEAK GRADIENT: 5 MMHG
AV VALVE AREA: 2.77 CM2
AV VELOCITY RATIO: 0.87
CV ECHO LV RWT: 0.38 CM
DOP CALC AO PEAK VEL: 1.08 M/S
DOP CALC AO VTI: 21.2 CM
DOP CALC LVOT AREA: 3.3 CM2
DOP CALC LVOT DIAMETER: 2.06 CM
DOP CALC LVOT PEAK VEL: 0.94 M/S
DOP CALC LVOT STROKE VOLUME: 58.8 CM3
DOP CALCLVOT PEAK VEL VTI: 17.65 CM
E WAVE DECELERATION TIME: 182.36 MSEC
E/A RATIO: 0.97
E/E' RATIO: 5.6 M/S
ECHO LV POSTERIOR WALL: 0.94 CM (ref 0.6–1.1)
EJECTION FRACTION: 55 %
FRACTIONAL SHORTENING: 32 % (ref 28–44)
INTERVENTRICULAR SEPTUM: 0.88 CM (ref 0.6–1.1)
IVRT: 96.89 MSEC
LA MAJOR: 5.53 CM
LA MINOR: 4.75 CM
LA WIDTH: 4.76 CM
LEFT ATRIUM SIZE: 4.14 CM
LEFT ATRIUM VOLUME: 85.6 CM3
LEFT INTERNAL DIMENSION IN SYSTOLE: 3.33 CM (ref 2.1–4)
LEFT VENTRICLE DIASTOLIC VOLUME: 113.22 ML
LEFT VENTRICLE SYSTOLIC VOLUME: 45.24 ML
LEFT VENTRICULAR INTERNAL DIMENSION IN DIASTOLE: 4.91 CM (ref 3.5–6)
LEFT VENTRICULAR MASS: 155.73 G
LV LATERAL E/E' RATIO: 5.38 M/S
LV SEPTAL E/E' RATIO: 5.83 M/S
MV A" WAVE DURATION": 9 MSEC
MV PEAK A VEL: 0.72 M/S
MV PEAK E VEL: 0.7 M/S
MV STENOSIS PRESSURE HALF TIME: 52.88 MS
MV VALVE AREA P 1/2 METHOD: 4.16 CM2
PISA TR MAX VEL: 1.88 M/S
PULM VEIN S/D RATIO: 1
PV PEAK D VEL: 0.51 M/S
PV PEAK S VEL: 0.51 M/S
PV PEAK VELOCITY: 1.17 CM/S
RA MAJOR: 5.22 CM
RA PRESSURE: 3 MMHG
RA WIDTH: 4.29 CM
RIGHT VENTRICULAR END-DIASTOLIC DIMENSION: 3.8 CM
RV TISSUE DOPPLER FREE WALL SYSTOLIC VELOCITY 1 (APICAL 4 CHAMBER VIEW): 12.97 CM/S
SINUS: 2.96 CM
STJ: 2.26 CM
TDI LATERAL: 0.13 M/S
TDI SEPTAL: 0.12 M/S
TDI: 0.13 M/S
TR MAX PG: 14 MMHG
TRICUSPID ANNULAR PLANE SYSTOLIC EXCURSION: 2.12 CM
TV REST PULMONARY ARTERY PRESSURE: 17 MMHG

## 2022-09-01 PROCEDURE — 93306 ECHO (CUPID ONLY): ICD-10-PCS | Mod: 26,,, | Performed by: INTERNAL MEDICINE

## 2022-09-01 PROCEDURE — 93306 TTE W/DOPPLER COMPLETE: CPT

## 2022-09-01 PROCEDURE — 93306 TTE W/DOPPLER COMPLETE: CPT | Mod: 26,,, | Performed by: INTERNAL MEDICINE

## 2022-09-01 NOTE — TELEPHONE ENCOUNTER
----- Message from Tricia Gorman sent at 9/1/2022  3:35 PM CDT -----  Regarding: self .[phone]  .Type: Patient Call Back    Who called: self     What is the request in detail: Pt stated that he needs his rx for semaglutide (OZEMPIC) 1 mg/dose (4 mg/3 mL) corrected to a 90 day in order for his insurance company to cover refill     Can the clinic reply by [facility]? Call back     Would the patient rather a call back or a response via [facility]?  Call back   Best call back number .[phone]

## 2022-09-01 NOTE — TELEPHONE ENCOUNTER
No new care gaps identified.  [facility] Embedded Care Gaps. Reference number: [id number]. 9/01/2022   4:07:02 PM CDT

## 2022-09-01 NOTE — TELEPHONE ENCOUNTER
Spoke with pharmacy and she stated that they don't have the script, but 9 pens will cover the way the insurance company would pay fir it.

## 2022-09-02 RX ORDER — SEMAGLUTIDE 1.34 MG/ML
1 INJECTION, SOLUTION SUBCUTANEOUS
Qty: 9 PEN | Refills: 0 | Status: SHIPPED | OUTPATIENT
Start: 2022-09-02 | End: 2022-11-02

## 2022-09-20 ENCOUNTER — OFFICE VISIT (OUTPATIENT)
Dept: CARDIOLOGY | Facility: CLINIC | Age: 52
End: 2022-09-20
Payer: COMMERCIAL

## 2022-09-20 VITALS — DIASTOLIC BLOOD PRESSURE: 64 MMHG | OXYGEN SATURATION: 99 % | SYSTOLIC BLOOD PRESSURE: 112 MMHG | HEART RATE: 79 BPM

## 2022-09-20 DIAGNOSIS — E11.9 DIABETES MELLITUS TYPE 2 WITHOUT RETINOPATHY: Primary | ICD-10-CM

## 2022-09-20 DIAGNOSIS — H52.7 REFRACTIVE ERROR: ICD-10-CM

## 2022-09-20 DIAGNOSIS — N52.9 ERECTILE DYSFUNCTION, UNSPECIFIED ERECTILE DYSFUNCTION TYPE: ICD-10-CM

## 2022-09-20 DIAGNOSIS — H25.13 NUCLEAR SCLEROSIS, BILATERAL: ICD-10-CM

## 2022-09-20 DIAGNOSIS — E11.9 TYPE 2 DIABETES MELLITUS WITHOUT COMPLICATION, WITHOUT LONG-TERM CURRENT USE OF INSULIN: ICD-10-CM

## 2022-09-20 PROCEDURE — 99214 PR OFFICE/OUTPT VISIT, EST, LEVL IV, 30-39 MIN: ICD-10-PCS | Mod: S$GLB,,, | Performed by: INTERNAL MEDICINE

## 2022-09-20 PROCEDURE — 99999 PR PBB SHADOW E&M-EST. PATIENT-LVL III: CPT | Mod: PBBFAC,,, | Performed by: INTERNAL MEDICINE

## 2022-09-20 PROCEDURE — 99999 PR PBB SHADOW E&M-EST. PATIENT-LVL III: ICD-10-PCS | Mod: PBBFAC,,, | Performed by: INTERNAL MEDICINE

## 2022-09-20 PROCEDURE — 99214 OFFICE O/P EST MOD 30 MIN: CPT | Mod: S$GLB,,, | Performed by: INTERNAL MEDICINE

## 2022-09-20 NOTE — PROGRESS NOTES
Jaswinder Valle is a 52 y.o. male who presents for cardiovascular risk assessment       HISTORY OF PRESENT ILLNESS:      Patient is a pleasant 49-year-old man who was recently diagnosed with diabetes.  States that he has a very active person and denies any chest pains at rest on exertion, orthopnea, PND.  States that he has to climb multiple flights of stair at has never had any issues with that.  Wants to optimize his cardiovascular risk     Notes from October 2019:  Patient here for follow-up today.  States that he does not want to take Lipitor and never even started..  He wants to changes diet and exercise and lifestyle patterns and see how that affects his diabetes and his lipid profile.  We spent a considerable amount of time talking about the data behind diet and exercise as well as Lipitor and diabetes.  Patient states that he understands and would try lifestyle modifications and agreed for rechecking lipid profile in 6 months.        May 22:  Patient here for follow-up after a long hiatus.  Denies any chest pains at rest on exertion, orthopnea, PND.  Concerned about the condition of his heart because uncontrolled diabetes as well as dyslipidemia.  Still refusing to take statins.        Notes from May 2022:  Patient here for follow-up.  Asymptomatic.  Stress test abnormal.  EF 45%.  Stress EKG abnormal.     The ECG portion of this study is positive for myocardial ischemia.  The stress echo portion of this study is negative for myocardial ischemia.  Mildly decreased systolic function.  The estimated ejection fraction is 45%.  The patient's exercise capacity was normal.  The test was stopped because the patient experienced fatigue.  There were no arrhythmias during stress.        July 22: doing fine. No cp, orthopnea, pnd       The pre-procedure left ventricular end diastolic pressure was 4.  The estimated blood loss was none.  There was non-obstructive coronary artery disease..     LEFT MAIN: NO SIGNIFICANT  STENOSIS     LAD: PROX 10-20% STENOSIS     LCX: 10% STENOSIS     RCA: PROX 30%  STENOSIS     ACCESS: We accessed the right radial artery using ultrasound guidance. The vessel appeared widely patent, suitable for endovascular access. The images were interpreted by me and saved.  Access was closed with radial band.         September 2022: Patient here for follow-up.  Doing fine.  Denies any chest pains at rest on exertion, orthopnea, PND.  EF has normalized        The left ventricle is normal in size with mild concentric hypertrophy and normal systolic function.  The estimated ejection fraction is 55%.  Normal left ventricular diastolic function.  Normal right ventricular size with normal right ventricular systolic function.  Normal central venous pressure (3 mmHg).  The estimated PA systolic pressure is 17 mmHg.  There is no pulmonary hypertension.      PAST MEDICAL HISTORY:           Past Medical History:   Diagnosis Date    Diabetes mellitus type 2 without retinopathy 6/30/2016    Diabetes mellitus, type 2      Diabetes type 2, uncontrolled 6/29/2016    Nuclear sclerosis of both eyes 6/30/2016         PAST SURGICAL HISTORY:            Past Surgical History:   Procedure Laterality Date    COLONOSCOPY N/A 5/16/2018     Procedure: COLONOSCOPY;  Surgeon: Jovany Mendiola MD;  Location: [facility];  Service: Endoscopy;  Laterality: N/A;         ALLERGIES AND MEDICATION:           Review of patient's allergies indicates:   Allergen Reactions    No known drug allergies            Medication List            Accurate as of May 26, 2022  9:08 AM. If you have any questions, ask your nurse or doctor.              CONTINUE taking these medications    metFORMIN 500 MG ER 24hr tablet  Commonly known as: GLUCOPHAGE-XR  Take 1 tablet by mouth once daily      tadalafiL 20 MG Tab  Commonly known as: CIALIS  Take 1 tablet (20 mg total) by mouth once daily. Take 1 hour before intercourse      TRULICITY 0.75 mg/0.5 mL pen  "injector  Generic drug: dulaglutide  INJECT 1 SYRINGE (0.75 MG)  SUBCUTANEOUSLY ONCE A WEEK                SOCIAL HISTORY:      Social History               Socioeconomic History    Marital status: Single   Tobacco Use    Smoking status: Never Smoker    Smokeless tobacco: Never Used   Substance and Sexual Activity    Alcohol use: Yes    Drug use: No    Sexual activity: Yes       Partners: Female            FAMILY HISTORY:            Family History   Problem Relation Age of Onset    Cataracts Mother      Diabetes Mother      No Known Problems Father      No Known Problems Sister      No Known Problems Brother      No Known Problems Maternal Aunt      No Known Problems Maternal Uncle      No Known Problems Paternal Aunt      No Known Problems Paternal Uncle      No Known Problems Maternal Grandmother      No Known Problems Maternal Grandfather      No Known Problems Paternal Grandmother      No Known Problems Paternal Grandfather      Amblyopia Neg Hx      Blindness Neg Hx      Cancer Neg Hx      Hypertension Neg Hx      Macular degeneration Neg Hx      Retinal detachment Neg Hx      Strabismus Neg Hx      Stroke Neg Hx      Thyroid disease Neg Hx      Glaucoma Neg Hx           REVIEW OF SYSTEMS:   Review of Systems   Constitutional: Negative.    HENT: Negative.    Eyes: Negative.    Respiratory: Negative.    Cardiovascular: Negative.    Gastrointestinal: Negative.    Genitourinary: Negative.    Musculoskeletal: Negative.    Skin: Negative.    Neurological: Negative.    Endo/Heme/Allergies: Negative.    Psychiatric/Behavioral: Negative.          A 10 point review of systems was performed and all the pertinent positives have been mentioned. Rest of review of systems was negative.           PHYSICAL EXAM:          Vitals:     05/26/22 0857   BP: 136/78   Pulse: 78   Resp: 15    Body mass index is 27.52 kg/m².  Weight: 87 kg (191 lb 12.8 oz)   Height: 5' 10" (177.8 cm)      Physical Exam   Constitutional: He is oriented " to person, place, and time. He appears well-developed and well-nourished. He is active.   HENT:   Head: Normocephalic and atraumatic.   Right Ear: Hearing normal.   Left Ear: Hearing normal.   Nose: Nose normal.   Mouth/Throat: Mucous membranes are normal.   Eyes: Pupils are equal, round, and reactive to light. Conjunctivae and lids are normal.   Neck: Normal range of motion. Neck supple.   Cardiovascular: Normal rate, regular rhythm, normal heart sounds, intact distal pulses and normal pulses.   Pulmonary/Chest: Effort normal and breath sounds normal.   Abdominal: Soft. Normal appearance. There is no tenderness.   Musculoskeletal: He exhibits no edema or deformity.   Neurological: He is alert and oriented to person, place, and time.   Skin: Skin is warm, dry and intact.   Psychiatric: He has a normal mood and affect. His speech is normal.   Nursing note and vitals reviewed.           DATA:      Laboratory:  CBC:         CHEMISTRIES:      Recent Labs   Lab 01/20/21  1312   Glucose 129 H   Sodium 138   Potassium 3.9   BUN 17   Creatinine 1.2   eGFR if African American >60.0   eGFR if non African American >60.0   Calcium 9.2         CARDIAC BIOMARKERS:         COAGS:         LIPIDS/LFTS:       Recent Labs   Lab 10/02/19  1509 01/20/21  1312   Cholesterol  --  210 H   Triglycerides  --  77   HDL  --  52   LDL Cholesterol  --  142.6   Non-HDL Cholesterol  --  158   AST 19 31   ALT 25 36                 Hemoglobin A1C   Date Value Ref Range Status   04/23/2021 6.7 (H) 4.0 - 5.6 % Final       Comment:       ADA Screening Guidelines:  5.7-6.4%  Consistent with prediabetes  >or=6.5%  Consistent with diabetes     High levels of fetal hemoglobin interfere with the HbA1C  assay. Heterozygous hemoglobin variants (HbS, HgC, etc)do  not significantly interfere with this assay.   However, presence of multiple variants may affect accuracy.      01/20/2021 9.3 (H) 4.0 - 5.6 % Final       Comment:       ADA Screening  Guidelines:  5.7-6.4%  Consistent with prediabetes  >or=6.5%  Consistent with diabetes  High levels of fetal hemoglobin interfere with the HbA1C  assay. Heterozygous hemoglobin variants (HbS, HgC, etc)do  not significantly interfere with this assay.   However, presence of multiple variants may affect accuracy.      02/13/2020 8.6 (H) 4.8 - 5.6 % Final       Comment:       [facility]   09/04/2019 8.2 (H) 4.0 - 5.6 % Final       Comment:       ADA Screening Guidelines:  5.7-6.4%  Consistent with prediabetes  >or=6.5%  Consistent with diabetes  High levels of fetal hemoglobin interfere with the HbA1C  assay. Heterozygous hemoglobin variants (HbS, HgC, etc)do  not significantly interfere with this assay.   However, presence of multiple variants may affect accuracy.            TSH         The 10-year ASCVD risk score (Myrtle GALLEGOS Jr., et al., 2013) is: 13.1%    Values used to calculate the score:      Age: 52 years      Sex: Male      Is Non- : Yes      Diabetic: Yes      Tobacco smoker: No      Systolic Blood Pressure: 136 mmHg      Is BP treated: No      HDL Cholesterol: 52 mg/dL      Total Cholesterol: 210 mg/dL               Cardiovascular Testing:              ASSESSMENT AND PLAN          Patient Active Problem List   Diagnosis    Diabetes mellitus type 2 without retinopathy    Nuclear sclerosis, bilateral    Refractive error    Type 2 diabetes mellitus without complication, without long-term current use of insulin    Erectile dysfunction    BPH with urinary obstruction         1.  Cardiovascular risk optimization.  On Lipitor     2. Cardiomyopathy with EF of 45% and abnormal stress EKG.  Stress echo portion was normal.  Because of EF of 45% and abnormal stress EKG, will proceed with coronary angiogram to find underlying etiology of drop in since his echo in 2019. I have asked patient to start taking aspirin 81 mg daily as well as losartan 25 mg daily. Subsequent angiogram did not reveal any  significant coronary artery stenosis.  EF has normalized.          Thank you very much for involving me in the care of your patient.  Please do not hesitate to contact me if there are any questions.       Km Patel MD, FACC, [facility]  Interventional Cardiologist, [facility].

## 2022-11-02 DIAGNOSIS — E11.9 TYPE 2 DIABETES MELLITUS WITHOUT COMPLICATION, WITHOUT LONG-TERM CURRENT USE OF INSULIN: ICD-10-CM

## 2022-11-02 RX ORDER — SEMAGLUTIDE 1.34 MG/ML
1 INJECTION, SOLUTION SUBCUTANEOUS
Qty: 3 PEN | Refills: 0 | Status: SHIPPED | OUTPATIENT
Start: 2022-11-02 | End: 2022-12-05 | Stop reason: SDUPTHER

## 2022-11-02 NOTE — TELEPHONE ENCOUNTER
Refill Decision Note   Jaswinder Valle  is requesting a refill authorization.  Brief Assessment and Rationale for Refill:  Approve     Medication Therapy Plan:       Medication Reconciliation Completed: No   Comments:     No Care Gaps recommended.     Note composed:4:21 PM 11/02/2022

## 2022-11-02 NOTE — TELEPHONE ENCOUNTER
No new care gaps identified.  [facility] Embedded Care Gaps. Reference number: [id number]. 11/02/2022   9:27:09 AM JABARIT

## 2022-12-05 ENCOUNTER — OFFICE VISIT (OUTPATIENT)
Dept: FAMILY MEDICINE | Facility: CLINIC | Age: 52
End: 2022-12-05
Payer: COMMERCIAL

## 2022-12-05 VITALS — DIASTOLIC BLOOD PRESSURE: 81 MMHG | SYSTOLIC BLOOD PRESSURE: 114 MMHG

## 2022-12-05 DIAGNOSIS — E11.9 TYPE 2 DIABETES MELLITUS WITHOUT COMPLICATION, WITHOUT LONG-TERM CURRENT USE OF INSULIN: ICD-10-CM

## 2022-12-05 PROCEDURE — 99213 PR OFFICE/OUTPT VISIT, EST, LEVL III, 20-29 MIN: ICD-10-PCS | Mod: 95,,, | Performed by: FAMILY MEDICINE

## 2022-12-05 PROCEDURE — 99213 OFFICE O/P EST LOW 20 MIN: CPT | Mod: 95,,, | Performed by: FAMILY MEDICINE

## 2022-12-05 RX ORDER — SEMAGLUTIDE 1.34 MG/ML
1 INJECTION, SOLUTION SUBCUTANEOUS
Qty: 3 PEN | Refills: 3 | Status: SHIPPED | OUTPATIENT
Start: 2022-12-05 | End: 2023-02-14 | Stop reason: SDUPTHER

## 2022-12-05 NOTE — PROGRESS NOTES
Assessment & Plan  Problem List Items Addressed This Visit          Endocrine    Type 2 diabetes mellitus without complication, without long-term current use of insulin    Overview     Metformin caused renal insufficiency. Cannot tolerate this medication due to increased creatinine          Relevant Medications    semaglutide (OZEMPIC) 1 mg/dose (4 mg/3 mL)     Pt is currently stable on medication regimen.  Continue current therapy as scheduled.  Contact office with any questions about adjustments on medications.   Doing well     Health Maintenance reviewed.    Follow-up: No follow-ups on file.    ______________________________________________________________________    Chief Complaint  No chief complaint on file.      MAITE Valle is a 52 y.o. male with multiple medical diagnoses as listed in the medical history and problem list that presents for diabetes.  Pt is known to me with last appointment 6/28/2022.    Patient denies any new symptoms including chest pain, SOB, blurry vision, N/V, diarrhea.  Diabetes: The patient reports that they  check blood sugars only occasionally. Blood sugar results are generally in an acceptable range (> 70 and <150). The patient  denies any problems with low blood sugars. The patient  reports that they have been complaint with current treatment plan (diet, exercise, medication).    Bp log at home: 114/81        PAST MEDICAL HISTORY:  Past Medical History:   Diagnosis Date    Diabetes mellitus type 2 without retinopathy 6/30/2016    Diabetes mellitus, type 2     Diabetes type 2, uncontrolled 6/29/2016    Nuclear sclerosis of both eyes 6/30/2016       PAST SURGICAL HISTORY:  Past Surgical History:   Procedure Laterality Date    COLONOSCOPY N/A 5/16/2018    Procedure: COLONOSCOPY;  Surgeon: Jovany Mendiola MD;  Location: [facility];  Service: Endoscopy;  Laterality: N/A;    LEFT HEART CATHETERIZATION Left 7/6/2022    Procedure: Left heart cath;  Surgeon: Km Patel MD;   Location: [facility] CATH LAB;  Service: Cardiology;  Laterality: Left;  RN PREOP ON 6/29/22.--NEED H/P       SOCIAL HISTORY:  Social History     Socioeconomic History    Marital status: Single   Tobacco Use    Smoking status: Never    Smokeless tobacco: Never   Substance and Sexual Activity    Alcohol use: Yes    Drug use: No    Sexual activity: Yes     Partners: Female       FAMILY HISTORY:  Family History   Problem Relation Age of Onset    Cataracts Mother     Diabetes Mother     No Known Problems Father     No Known Problems Sister     No Known Problems Brother     No Known Problems Maternal Aunt     No Known Problems Maternal Uncle     No Known Problems Paternal Aunt     No Known Problems Paternal Uncle     No Known Problems Maternal Grandmother     No Known Problems Maternal Grandfather     No Known Problems Paternal Grandmother     No Known Problems Paternal Grandfather     Amblyopia Neg Hx     Blindness Neg Hx     Cancer Neg Hx     Hypertension Neg Hx     Macular degeneration Neg Hx     Retinal detachment Neg Hx     Strabismus Neg Hx     Stroke Neg Hx     Thyroid disease Neg Hx     Glaucoma Neg Hx        ALLERGIES AND MEDICATIONS: updated and reviewed.  Review of patient's allergies indicates:   Allergen Reactions    No known drug allergies      Current Outpatient Medications   Medication Sig Dispense Refill    aspirin 81 MG Chew Take 1 tablet (81 mg total) by mouth once daily. 90 tablet 3    atorvastatin (LIPITOR) 40 MG tablet Take 1 tablet (40 mg total) by mouth every evening. 90 tablet 3    losartan (COZAAR) 25 MG tablet Take 1 tablet (25 mg total) by mouth once daily. 90 tablet 3    semaglutide (OZEMPIC) 1 mg/dose (4 mg/3 mL) Inject 1 mg into the skin every 7 days. 3 pen 3    tadalafiL (CIALIS) 20 MG Tab Take 1 tablet (20 mg total) by mouth once daily. Take 1 hour before intercourse 10 tablet 11     No current facility-administered medications for this visit.         ROS  Review of Systems   Constitutional:   Negative for fatigue.   Cardiovascular:  Negative for chest pain.   Endocrine: Negative for polydipsia, polyphagia and polyuria.   Skin:  Negative for pallor.   Neurological:  Negative for dizziness, tremors, seizures, speech difficulty, weakness and headaches.   Psychiatric/Behavioral:  Negative for confusion. The patient is not nervous/anxious.          Physical Exam  Vitals:    12/05/22 1148   BP: 114/81    There is no height or weight on file to calculate BMI.          Physical Exam  Vitals reviewed.   Constitutional:       Appearance: Normal appearance. He is well-developed.   HENT:      Head: Normocephalic and atraumatic.      Right Ear: External ear normal.      Left Ear: External ear normal.      Nose: Nose normal.   Eyes:      Extraocular Movements: Extraocular movements intact.      Conjunctiva/sclera: Conjunctivae normal.      Pupils: Pupils are equal, round, and reactive to light.   Cardiovascular:      Rate and Rhythm: Normal rate and regular rhythm.      Heart sounds: Normal heart sounds.   Pulmonary:      Effort: Pulmonary effort is normal.      Breath sounds: Normal breath sounds.   Musculoskeletal:      Cervical back: Normal range of motion.   Skin:     General: Skin is warm and dry.   Neurological:      Mental Status: He is alert and oriented to person, place, and time.   Psychiatric:         Behavior: Behavior normal.         Health Maintenance         Date Due Completion Date    HIV Screening Never done ---    Foot Exam 04/06/2019 4/6/2018    Pneumococcal Vaccines (Age 0-64) (2 - PCV) 09/05/2020 9/5/2019    COVID-19 Vaccine (4 - Booster for Moderna series) 02/18/2022 12/24/2021    Influenza Vaccine (1) Never done ---    Hemoglobin A1c 03/28/2023 9/28/2022    Lipid Panel 06/24/2023 6/24/2022    Eye Exam 07/26/2023 7/26/2022    Diabetes Urine Screening 09/28/2023 9/28/2022    Colorectal Cancer Screening 05/16/2028 5/16/2018    TETANUS VACCINE 09/05/2029 9/5/2019                Patient note was  created using [employer].  Any errors in syntax or even information may not have been identified and edited on initial review prior to signing this note.  Answers submitted by the patient for this visit:  Diabetes Questionnaire (Submitted on 12/5/2022)  Chief Complaint: Diabetes problem  Diabetes type: type 2  MedicAlert ID: No  blurred vision: No  foot paresthesias: No  foot ulcerations: No  visual change: No  weight loss: No  Symptom course: stable  hunger: No  mood changes: No  sleepiness: No  sweats: No  blackouts: No  hospitalization: No  nocturnal hypoglycemia: No  required assistance: No  required glucagon: No  CVA: No  heart disease: No  impotence: Yes  nephropathy: No  peripheral neuropathy: No  PVD: No  retinopathy: No  autonomic neuropathy: No  CAD risks: diabetes mellitus  Current treatments: diet, oral agent (monotherapy)  Treatment compliance: most of the time  Dose schedule: pre-breakfast  Given by: patient  Injection sites: abdominal wall, thighs  Home blood tests: 1-2 x per week  Home urines: <1 x per month  Monitoring compliance: good  Blood glucose trend: fluctuating minimally  Weight trend: stable  Current diet: generally healthy  Meal planning: avoidance of concentrated sweets  Exercise: three times a week  Dietitian visit: No  Eye exam current: Yes  Sees podiatrist: No

## 2023-02-14 DIAGNOSIS — E11.9 TYPE 2 DIABETES MELLITUS WITHOUT COMPLICATION, WITHOUT LONG-TERM CURRENT USE OF INSULIN: ICD-10-CM

## 2023-02-14 RX ORDER — SEMAGLUTIDE 1.34 MG/ML
1 INJECTION, SOLUTION SUBCUTANEOUS
Qty: 3 PEN | Refills: 3 | Status: SHIPPED | OUTPATIENT
Start: 2023-02-14 | End: 2023-02-28 | Stop reason: SDUPTHER

## 2023-02-14 NOTE — TELEPHONE ENCOUNTER
Care Due:                  Date            Visit Type   Department     Provider  --------------------------------------------------------------------------------                                ESTABLISHED   [facility]                              PATIENT -    MED/ INTERNAL  Last Visit: 12-      VIRTUAL      MED/ PEDS      Mary Houston                              ESTABLISHED   [facility]                              PATIENT -    MED/ INTERNAL  Next Visit: 06-      VIRTUAL      MED/ PEDS      Mary Houston                                                            Last  Test          Frequency    Reason                     Performed    Due Date  --------------------------------------------------------------------------------    HBA1C.......  6 months...  semaglutide..............  09- 03-    [facility] Embedded Care Gaps. Reference number: [id number]. 2/14/2023   11:21:41 AM CST

## 2023-02-14 NOTE — TELEPHONE ENCOUNTER
----- Message from Tricia Gorman sent at 2/14/2023 11:10 AM CST -----  Regarding: self [phone]  .Type: RX Refill Request    Who Called:  self     Have you contacted your pharmacy: no    Refill or New Rx refill     RX Name and Strength: semaglutide (OZEMPIC) 1 mg/dose (4 mg/3 mL)    Is this a 30 day or 90 day RX: 90 day     Preferred Pharmacy with phone number: .  [facility]   Ph:[phone]    Local or Mail Order: mail    Would the patient rather a call back or a response via [facility]?  Call back     Best Call Back Number: .[phone]

## 2023-02-14 NOTE — TELEPHONE ENCOUNTER
----- Message from Jos Goldstein sent at 2/14/2023 11:18 AM CST -----  Regarding: pt  [phone]  .Type: RX Refill Request    Who Called: pt     Have you contacted your pharmacy:yes     Refill or New Rx:refill     RX Name and Strength:semaglutide (OZEMPIC) 1 mg/dose (4 mg/3 mL)    How is the patient currently taking it? (ex. 1XDay):    Is this a 30 day or 90 day RX:    Preferred Pharmacy with phone number:  [facility]   Phone # [phone]    Local or Mail Order:local     Ordering Provider:    Would the patient rather a call back or a response via [facility]? Call back     Best Call Back Number [phone]      Additional Information: pt first time with this pharmacy because of insurance change

## 2023-02-28 DIAGNOSIS — E11.9 TYPE 2 DIABETES MELLITUS WITHOUT COMPLICATION, WITHOUT LONG-TERM CURRENT USE OF INSULIN: ICD-10-CM

## 2023-02-28 NOTE — TELEPHONE ENCOUNTER
----- Message from Nella Kinsey sent at 2/28/2023 12:14 PM CST -----  Type: Patient Call Back    Who called:self    What is the request in detail:needs semaglutide (OZEMPIC) 1 mg/dose (4 mg/3 mL) sent to [facility] instead. Pt is completely out of medication and medication at [facility] is too expensive. Please call      [facility] - [facility] - [facility] AT [facility] to [facility] Sites  [facility]  [place] [address]  Phone: [phone] Fax: [phone]      Can the clinic reply by [facility]?    Would the patient rather a call back or a response via [facility]? call    Best call back number:[phone] (home)

## 2023-02-28 NOTE — TELEPHONE ENCOUNTER
No new care gaps identified.  [facility] Embedded Care Gaps. Reference number: [id number]. 2/28/2023   12:20:00 PM CST

## 2023-03-01 RX ORDER — SEMAGLUTIDE 1.34 MG/ML
1 INJECTION, SOLUTION SUBCUTANEOUS
Qty: 3 PEN | Refills: 3 | Status: SHIPPED | OUTPATIENT
Start: 2023-03-01 | End: 2023-06-16 | Stop reason: SDUPTHER

## 2023-04-26 DIAGNOSIS — E11.9 TYPE 2 DIABETES MELLITUS WITHOUT COMPLICATION: ICD-10-CM

## 2023-06-14 ENCOUNTER — LAB VISIT (OUTPATIENT)
Dept: LAB | Facility: HOSPITAL | Age: 53
End: 2023-06-14
Attending: FAMILY MEDICINE
Payer: COMMERCIAL

## 2023-06-14 DIAGNOSIS — E11.9 TYPE 2 DIABETES MELLITUS WITHOUT COMPLICATION: ICD-10-CM

## 2023-06-14 LAB
ESTIMATED AVG GLUCOSE: 183 MG/DL (ref 68–131)
HBA1C MFR BLD: 8 % (ref 4–5.6)

## 2023-06-14 PROCEDURE — 36415 COLL VENOUS BLD VENIPUNCTURE: CPT | Mod: PO | Performed by: FAMILY MEDICINE

## 2023-06-14 PROCEDURE — 83036 HEMOGLOBIN GLYCOSYLATED A1C: CPT | Performed by: FAMILY MEDICINE

## 2023-06-16 ENCOUNTER — OFFICE VISIT (OUTPATIENT)
Dept: FAMILY MEDICINE | Facility: CLINIC | Age: 53
End: 2023-06-16
Payer: COMMERCIAL

## 2023-06-16 VITALS
DIASTOLIC BLOOD PRESSURE: 76 MMHG | BODY MASS INDEX: 27.46 KG/M2 | SYSTOLIC BLOOD PRESSURE: 128 MMHG | HEIGHT: 70 IN | TEMPERATURE: 98 F | HEART RATE: 73 BPM | WEIGHT: 191.81 LBS | OXYGEN SATURATION: 99 %

## 2023-06-16 DIAGNOSIS — Z12.5 PROSTATE CANCER SCREENING: ICD-10-CM

## 2023-06-16 DIAGNOSIS — G89.29 CHRONIC LEFT SHOULDER PAIN: ICD-10-CM

## 2023-06-16 DIAGNOSIS — E11.9 ENCOUNTER FOR DIABETIC FOOT EXAM: ICD-10-CM

## 2023-06-16 DIAGNOSIS — E11.65 TYPE 2 DIABETES MELLITUS WITH HYPERGLYCEMIA, WITHOUT LONG-TERM CURRENT USE OF INSULIN: ICD-10-CM

## 2023-06-16 DIAGNOSIS — E11.9 TYPE 2 DIABETES MELLITUS WITHOUT COMPLICATION, WITHOUT LONG-TERM CURRENT USE OF INSULIN: ICD-10-CM

## 2023-06-16 DIAGNOSIS — M25.512 CHRONIC LEFT SHOULDER PAIN: ICD-10-CM

## 2023-06-16 DIAGNOSIS — Z00.00 ANNUAL PHYSICAL EXAM: Primary | ICD-10-CM

## 2023-06-16 PROCEDURE — 90471 IMMUNIZATION ADMIN: CPT | Mod: S$GLB,,, | Performed by: FAMILY MEDICINE

## 2023-06-16 PROCEDURE — 99999 PR PBB SHADOW E&M-EST. PATIENT-LVL IV: ICD-10-PCS | Mod: PBBFAC,,, | Performed by: FAMILY MEDICINE

## 2023-06-16 PROCEDURE — 99999 PR PBB SHADOW E&M-EST. PATIENT-LVL IV: CPT | Mod: PBBFAC,,, | Performed by: FAMILY MEDICINE

## 2023-06-16 PROCEDURE — 99396 PR PREVENTIVE VISIT,EST,40-64: ICD-10-PCS | Mod: 25,S$GLB,, | Performed by: FAMILY MEDICINE

## 2023-06-16 PROCEDURE — 99396 PREV VISIT EST AGE 40-64: CPT | Mod: 25,S$GLB,, | Performed by: FAMILY MEDICINE

## 2023-06-16 PROCEDURE — 90677 PNEUMOCOCCAL CONJUGATE VACCINE 20-VALENT: ICD-10-PCS | Mod: S$GLB,,, | Performed by: FAMILY MEDICINE

## 2023-06-16 PROCEDURE — 90471 PNEUMOCOCCAL CONJUGATE VACCINE 20-VALENT: ICD-10-PCS | Mod: S$GLB,,, | Performed by: FAMILY MEDICINE

## 2023-06-16 PROCEDURE — 90677 PCV20 VACCINE IM: CPT | Mod: S$GLB,,, | Performed by: FAMILY MEDICINE

## 2023-06-16 RX ORDER — SEMAGLUTIDE 1.34 MG/ML
1 INJECTION, SOLUTION SUBCUTANEOUS
Qty: 3 EACH | Refills: 3 | Status: SHIPPED | OUTPATIENT
Start: 2023-06-16 | End: 2023-09-14

## 2023-06-16 RX ORDER — SEMAGLUTIDE 1.34 MG/ML
1 INJECTION, SOLUTION SUBCUTANEOUS
Qty: 3 EACH | Refills: 3 | Status: SHIPPED | OUTPATIENT
Start: 2023-06-16 | End: 2023-06-16

## 2023-06-16 NOTE — PROGRESS NOTES
Assessment & Plan  Problem List Items Addressed This Visit          Endocrine    Type 2 diabetes mellitus with hyperglycemia, without long-term current use of insulin    Overview     Metformin caused renal insufficiency. Cannot tolerate this medication due to increased creatinine           Other Visit Diagnoses       Annual physical exam    -  Primary    Encounter for diabetic foot exam        Relevant Orders    Ambulatory referral/consult to Podiatry        I addressed all major concerns as it related to health maintenance.  All were ordered and scheduled based on the patients wishes.  Any additional health maintenance will be readdressed at the next physical if declined or deferred by the patient.        Health Maintenance reviewed.    Follow-up: No follow-ups on file.    ______________________________________________________________________    Chief Complaint  Chief Complaint   Patient presents with    Annual Exam       HPI  Jaswinder Valle is a 53 y.o. male with multiple medical diagnoses as listed in the medical history and problem list that presents for annual exam.  Pt is known to me with last appointment 12/5/2022.    Patient denies any new symptoms including chest pain, SOB, blurry vision, N/V, diarrhea.  Patient does have a noted elevation in his hemoglobin A1c.  He was previously 7.5.  He has lost 4 lb since his last office evaluation.  Patient is unable to tolerate metformin due to impact on renal function.      PAST MEDICAL HISTORY:  Past Medical History:   Diagnosis Date    Diabetes mellitus type 2 without retinopathy 6/30/2016    Diabetes mellitus, type 2     Diabetes type 2, uncontrolled 6/29/2016    Nuclear sclerosis of both eyes 6/30/2016       PAST SURGICAL HISTORY:  Past Surgical History:   Procedure Laterality Date    COLONOSCOPY N/A 5/16/2018    Procedure: COLONOSCOPY;  Surgeon: Jovany Mendiola MD;  Location: [facility];  Service: Endoscopy;  Laterality: N/A;    LEFT HEART CATHETERIZATION  Left 7/6/2022    Procedure: Left heart cath;  Surgeon: Km Patel MD;  Location: [facility] CATH LAB;  Service: Cardiology;  Laterality: Left;  RN PREOP ON 6/29/22.--NEED H/P       SOCIAL HISTORY:  Social History     Socioeconomic History    Marital status: Single   Tobacco Use    Smoking status: Never    Smokeless tobacco: Never   Substance and Sexual Activity    Alcohol use: Yes    Drug use: No    Sexual activity: Yes     Partners: Female       FAMILY HISTORY:  Family History   Problem Relation Age of Onset    Cataracts Mother     Diabetes Mother     No Known Problems Father     No Known Problems Sister     No Known Problems Brother     No Known Problems Maternal Aunt     No Known Problems Maternal Uncle     No Known Problems Paternal Aunt     No Known Problems Paternal Uncle     No Known Problems Maternal Grandmother     No Known Problems Maternal Grandfather     No Known Problems Paternal Grandmother     No Known Problems Paternal Grandfather     Amblyopia Neg Hx     Blindness Neg Hx     Cancer Neg Hx     Hypertension Neg Hx     Macular degeneration Neg Hx     Retinal detachment Neg Hx     Strabismus Neg Hx     Stroke Neg Hx     Thyroid disease Neg Hx     Glaucoma Neg Hx        ALLERGIES AND MEDICATIONS: updated and reviewed.  Review of patient's allergies indicates:   Allergen Reactions    No known drug allergies      Current Outpatient Medications   Medication Sig Dispense Refill    aspirin 81 MG Chew Take 1 tablet (81 mg total) by mouth once daily. 90 tablet 3    atorvastatin (LIPITOR) 40 MG tablet Take 1 tablet (40 mg total) by mouth every evening. 90 tablet 3    losartan (COZAAR) 25 MG tablet Take 1 tablet (25 mg total) by mouth once daily. 90 tablet 3    tadalafiL (CIALIS) 20 MG Tab Take 1 tablet (20 mg total) by mouth once daily. Take 1 hour before intercourse (Patient not taking: Reported on 6/16/2023) 10 tablet 11     No current facility-administered medications for this visit.         ROS  Review of  "Systems   Constitutional:  Negative for activity change and unexpected weight change.   HENT:  Negative for hearing loss, rhinorrhea and trouble swallowing.    Eyes:  Negative for discharge and visual disturbance.   Respiratory:  Negative for chest tightness and wheezing.    Cardiovascular:  Negative for chest pain and palpitations.   Gastrointestinal:  Negative for blood in stool, constipation, diarrhea and vomiting.   Endocrine: Negative for polydipsia and polyuria.   Genitourinary:  Negative for difficulty urinating, hematuria and urgency.   Musculoskeletal:  Positive for arthralgias. Negative for joint swelling and neck pain.   Neurological:  Negative for weakness and headaches.   Psychiatric/Behavioral:  Negative for confusion and dysphoric mood.          Physical Exam  Vitals:    06/16/23 1408   BP: 128/76   Pulse: 73   Temp: 97.6 °F (36.4 °C)   SpO2: 99%   Weight: 87 kg (191 lb 12.8 oz)   Height: 5' 10" (1.778 m)    Body mass index is 27.52 kg/m².  Weight: 87 kg (191 lb 12.8 oz)   Height: 5' 10" (177.8 cm)   Physical Exam  Vitals reviewed.   Constitutional:       Appearance: Normal appearance. He is well-developed.   HENT:      Head: Normocephalic and atraumatic.      Right Ear: External ear normal.      Left Ear: External ear normal.      Nose: Nose normal.   Eyes:      Extraocular Movements: Extraocular movements intact.      Conjunctiva/sclera: Conjunctivae normal.      Pupils: Pupils are equal, round, and reactive to light.   Cardiovascular:      Rate and Rhythm: Normal rate and regular rhythm.      Heart sounds: Normal heart sounds.   Pulmonary:      Effort: Pulmonary effort is normal.      Breath sounds: Normal breath sounds.   Genitourinary:     Prostate: Normal. Not enlarged, not tender and no nodules present.      Rectum: Normal. No mass, tenderness, anal fissure, external hemorrhoid or internal hemorrhoid. Normal anal tone.   Musculoskeletal:      Cervical back: Normal range of motion.   Skin:     " General: Skin is warm and dry.   Neurological:      Mental Status: He is alert and oriented to person, place, and time.   Psychiatric:         Behavior: Behavior normal.       Health Maintenance         Date Due Completion Date    HIV Screening Never done ---    Foot Exam 04/06/2019 4/6/2018    Pneumococcal Vaccines (Age 0-64) (2 - PCV) 09/05/2020 9/5/2019    COVID-19 Vaccine (4 - Moderna series) 02/18/2022 12/24/2021    Lipid Panel 06/24/2023 6/24/2022    Eye Exam 07/26/2023 7/26/2022    Influenza Vaccine (Season Ended) 09/01/2023 ---    Hemoglobin A1c 09/14/2023 6/14/2023    Diabetes Urine Screening 09/28/2023 9/28/2022    Colorectal Cancer Screening 05/16/2028 5/16/2018    TETANUS VACCINE 09/05/2029 9/5/2019                Patient note was created using [employer].  Any errors in syntax or even information may not have been identified and edited on initial review prior to signing this note.

## 2023-06-16 NOTE — PROGRESS NOTES
Patient tolerated vaccines. Patient given VIS,and advised to remain in clinic for 15 mins. to monitor for adverse reactions.

## 2023-07-14 ENCOUNTER — OFFICE VISIT (OUTPATIENT)
Dept: OPTOMETRY | Facility: CLINIC | Age: 53
End: 2023-07-14
Payer: COMMERCIAL

## 2023-07-14 DIAGNOSIS — H52.203 HYPEROPIA WITH ASTIGMATISM AND PRESBYOPIA, BILATERAL: ICD-10-CM

## 2023-07-14 DIAGNOSIS — H52.03 HYPEROPIA WITH ASTIGMATISM AND PRESBYOPIA, BILATERAL: ICD-10-CM

## 2023-07-14 DIAGNOSIS — E11.9 TYPE 2 DIABETES MELLITUS WITHOUT RETINOPATHY: Primary | ICD-10-CM

## 2023-07-14 DIAGNOSIS — H52.4 HYPEROPIA WITH ASTIGMATISM AND PRESBYOPIA, BILATERAL: ICD-10-CM

## 2023-07-14 PROCEDURE — 99999 PR PBB SHADOW E&M-EST. PATIENT-LVL III: ICD-10-PCS | Mod: PBBFAC,,, | Performed by: OPTOMETRIST

## 2023-07-14 PROCEDURE — 92015 DETERMINE REFRACTIVE STATE: CPT | Mod: S$GLB,,, | Performed by: OPTOMETRIST

## 2023-07-14 PROCEDURE — 92014 COMPRE OPH EXAM EST PT 1/>: CPT | Mod: S$GLB,,, | Performed by: OPTOMETRIST

## 2023-07-14 PROCEDURE — 92014 PR EYE EXAM, EST PATIENT,COMPREHESV: ICD-10-PCS | Mod: S$GLB,,, | Performed by: OPTOMETRIST

## 2023-07-14 PROCEDURE — 99999 PR PBB SHADOW E&M-EST. PATIENT-LVL III: CPT | Mod: PBBFAC,,, | Performed by: OPTOMETRIST

## 2023-07-14 PROCEDURE — 92015 PR REFRACTION: ICD-10-PCS | Mod: S$GLB,,, | Performed by: OPTOMETRIST

## 2023-07-14 NOTE — PROGRESS NOTES
MAITE    SHERRI: 07/22 with Dr. Mora  Chief complaint (CC): Patient is here for annul eye exam today.  Patient   has several pairs of OTC readers that he uses for distance, computer and   reading.  Patient has tried bifocals and progressives in the past but   always had trouble with them  patient would like a current prescription   for progressive lenses today.  Patient has trouble with headlights at   night when he's not wearing glasses.   Glasses? +  Contacts? -  H/o eye surgery, injections or laser: -  H/o eye injury: -  Known eye conditions? See above  Family h/o eye conditions? -  Eye gtts? -      (-) Flashes (-)  Floaters (-) Mucous   (-)  Tearing (-) Itching (-) Burning   (-) Headaches (-) Eye Pain/discomfort (-) Irritation   (-)  Redness (-) Double vision (-) Blurry vision    Diabetic? + yesterday  A1c? Hemoglobin A1C       Date                     Value               Ref Range             Status                06/14/2023               8.0 (H)             4.0 - 5.6 %           Final                 09/28/2022               7.5 (H)             4.0 - 5.6 %           Final                 06/24/2022               12.1 (H)            4.0 - 5.6 %           Final                  02/13/2020               8.6 (H)             4.8 - 5.6 %           Final                    Last edited by Jeanie Sood on 7/14/2023  2:31 PM.            Assessment /Plan     For exam results, see Encounter Report.  Type 2 diabetes mellitus without retinopathy  BS control. No signs of diabetic retinopathy. Monitor with annual exam.    Hyperopia with astigmatism and presbyopia, bilateral  SRx released to patient. Patient educated on lens options. Normal ocular health. RTC 1 year for routine exam.

## 2023-07-17 ENCOUNTER — OFFICE VISIT (OUTPATIENT)
Dept: PODIATRY | Facility: CLINIC | Age: 53
End: 2023-07-17
Payer: COMMERCIAL

## 2023-07-17 VITALS — HEIGHT: 70 IN | BODY MASS INDEX: 27.46 KG/M2 | WEIGHT: 191.81 LBS

## 2023-07-17 DIAGNOSIS — E11.9 ENCOUNTER FOR DIABETIC FOOT EXAM: ICD-10-CM

## 2023-07-17 PROCEDURE — 99203 PR OFFICE/OUTPT VISIT, NEW, LEVL III, 30-44 MIN: ICD-10-PCS | Mod: S$GLB,,, | Performed by: PODIATRIST

## 2023-07-17 PROCEDURE — 99203 OFFICE O/P NEW LOW 30 MIN: CPT | Mod: S$GLB,,, | Performed by: PODIATRIST

## 2023-07-17 PROCEDURE — 99999 PR PBB SHADOW E&M-EST. PATIENT-LVL III: CPT | Mod: PBBFAC,,, | Performed by: PODIATRIST

## 2023-07-17 PROCEDURE — 99999 PR PBB SHADOW E&M-EST. PATIENT-LVL III: ICD-10-PCS | Mod: PBBFAC,,, | Performed by: PODIATRIST

## 2023-07-17 NOTE — PROGRESS NOTES
Subjective:     Patient ID: Jaswinder Valle is a 53 y.o. male.    Chief Complaint: Diabetes Mellitus and Diabetic Foot Exam (6/16/23 Dr Braden PCP)    Jaswinder is a 53 y.o. male who presents to the clinic upon referral from Dr. Braden  for evaluation and treatment of diabetic feet. Jaswinder has a past medical history of Diabetes mellitus type 2 without retinopathy (6/30/2016), Diabetes mellitus, type 2, Diabetes type 2, uncontrolled (6/29/2016), and Nuclear sclerosis of both eyes (6/30/2016). Presents for diabetic foot risk assessment.       PCP: Mary Braden MD    Date Last Seen by PCP: per above    Current shoe gear: Tennis shoes    Hemoglobin A1C   Date Value Ref Range Status   06/14/2023 8.0 (H) 4.0 - 5.6 % Final     Comment:     ADA Screening Guidelines:  5.7-6.4%  Consistent with prediabetes  >or=6.5%  Consistent with diabetes    High levels of fetal hemoglobin interfere with the HbA1C  assay. Heterozygous hemoglobin variants (HbS, HgC, etc)do  not significantly interfere with this assay.   However, presence of multiple variants may affect accuracy.     09/28/2022 7.5 (H) 4.0 - 5.6 % Final     Comment:     ADA Screening Guidelines:  5.7-6.4%  Consistent with prediabetes  >or=6.5%  Consistent with diabetes    High levels of fetal hemoglobin interfere with the HbA1C  assay. Heterozygous hemoglobin variants (HbS, HgC, etc)do  not significantly interfere with this assay.   However, presence of multiple variants may affect accuracy.     06/24/2022 12.1 (H) 4.0 - 5.6 % Final     Comment:     ADA Screening Guidelines:  5.7-6.4%  Consistent with prediabetes  >or=6.5%  Consistent with diabetes    High levels of fetal hemoglobin interfere with the HbA1C  assay. Heterozygous hemoglobin variants (HbS, HgC, etc)do  not significantly interfere with this assay.   However, presence of multiple variants may affect accuracy.     02/13/2020 8.6 (H) 4.8 - 5.6 % Final     Comment:     [facility]         Review of Systems    Constitutional: Negative for chills.   Cardiovascular:  Negative for chest pain and claudication.   Respiratory:  Negative for cough.    Skin:  Positive for color change, dry skin and nail changes.   Musculoskeletal:  Positive for joint pain.   Gastrointestinal:  Negative for nausea.   Neurological:  Positive for paresthesias. Negative for numbness.   Psychiatric/Behavioral:  The patient is not nervous/anxious.       Objective:     Physical Exam  Constitutional:       Appearance: He is well-developed.      Comments: Oriented to time, place, and person.   Cardiovascular:      Comments: DP and PT pulses are palpable bilaterally. 3 sec capillary refill time and toes and feet are warm to touch proximally .  There is  hair growth on the feet and toes b/l. There is no edema b/l. No spider veins or varicosities present b/l.     Musculoskeletal:      Comments: Equinus noted b/l ankles with < 10 deg DF noted. MMT 5/5 in DF/PF/Inv/Ev resistance with no reproduction of pain in any direction. Passive range of motion of ankle and pedal joints is painless b/l.     Feet:      Right foot:      Skin integrity: No callus or dry skin.      Left foot:      Skin integrity: No callus or dry skin.   Lymphadenopathy:      Comments: Negative lymphadenopathy bilateral popliteal fossa and tarsal tunnel.   Skin:     Comments: No open lesions, lacerations or wounds noted.Interdigital spaces clean, dry and intact b/l. No erythema noted to b/l foot.  Nails normal color and trophic qualities.     Neurological:      Mental Status: He is alert.      Comments: Light touch, proprioception, and sharp/dull sensation are all intact bilaterally. Protective threshold with the Moriarty-Wienstein monofilament is intact bilaterally.    Psychiatric:         Behavior: Behavior is cooperative.         Assessment:      Encounter Diagnosis   Name Primary?    Encounter for diabetic foot exam      Plan:     Jaswinder was seen today for diabetes mellitus and diabetic  foot exam.    Diagnoses and all orders for this visit:    Encounter for diabetic foot exam  -     Ambulatory referral/consult to Podiatry      I counseled the patient on his conditions, their implications and medical management.      - Shoe inspection. Diabetic Foot Education. Patient reminded of the importance of good nutrition and blood sugar control to help prevent podiatric complications of diabetes. Patient instructed on proper foot hygeine. We discussed wearing proper shoe gear, daily foot inspections, never walking without protective shoe gear, caution putting sharp instruments to feet     - Discussed DM foot care:  Wear comfortable, proper fitting shoes. Wash feet daily. Dry well. After drying, apply moisturizer to feet (no lotion to webspaces). Inspect feet daily for skin breaks, blisters, swelling, or redness. Wear cotton socks (preferably white)  Change socks every day. Do NOT walk barefoot. Do NOT use heating pads or warm/hot water soaks     F/u one year DM foot exam sooner PRN

## 2023-07-19 RX ORDER — LOSARTAN POTASSIUM 25 MG/1
25 TABLET ORAL DAILY
Qty: 90 TABLET | Refills: 3 | Status: SHIPPED | OUTPATIENT
Start: 2023-07-19

## 2023-07-19 RX ORDER — ATORVASTATIN CALCIUM 40 MG/1
40 TABLET, FILM COATED ORAL NIGHTLY
Qty: 90 TABLET | Refills: 3 | Status: SHIPPED | OUTPATIENT
Start: 2023-07-19

## 2023-07-19 RX ORDER — NAPROXEN SODIUM 220 MG/1
81 TABLET, FILM COATED ORAL DAILY
Qty: 90 TABLET | Refills: 3 | Status: SHIPPED | OUTPATIENT
Start: 2023-07-19 | End: 2024-07-18

## 2023-09-16 ENCOUNTER — PATIENT MESSAGE (OUTPATIENT)
Dept: ADMINISTRATIVE | Facility: HOSPITAL | Age: 53
End: 2023-09-16
Payer: COMMERCIAL

## 2023-09-18 ENCOUNTER — PATIENT OUTREACH (OUTPATIENT)
Dept: ADMINISTRATIVE | Facility: HOSPITAL | Age: 53
End: 2023-09-18
Payer: COMMERCIAL

## 2023-09-18 DIAGNOSIS — E11.65 TYPE 2 DIABETES MELLITUS WITH HYPERGLYCEMIA, WITHOUT LONG-TERM CURRENT USE OF INSULIN: Primary | ICD-10-CM

## 2023-09-18 RX ORDER — NAPROXEN SODIUM 220 MG/1
TABLET, FILM COATED ORAL
COMMUNITY
Start: 2023-07-19 | End: 2023-12-20

## 2023-09-18 RX ORDER — MELOXICAM 7.5 MG/1
7.5 TABLET ORAL 2 TIMES DAILY PRN
COMMUNITY
Start: 2023-09-11

## 2023-09-18 NOTE — PROGRESS NOTES
Overdue Diabetes Labs - appointment scheduled on 12/15/2023.    Diabetes Urine will be due soon - appointment scheduled on 12/15/2023.

## 2023-11-06 ENCOUNTER — PATIENT MESSAGE (OUTPATIENT)
Dept: ADMINISTRATIVE | Facility: HOSPITAL | Age: 53
End: 2023-11-06
Payer: COMMERCIAL

## 2023-12-15 ENCOUNTER — LAB VISIT (OUTPATIENT)
Dept: LAB | Facility: HOSPITAL | Age: 53
End: 2023-12-15
Attending: FAMILY MEDICINE
Payer: COMMERCIAL

## 2023-12-15 DIAGNOSIS — E11.65 TYPE 2 DIABETES MELLITUS WITH HYPERGLYCEMIA, WITHOUT LONG-TERM CURRENT USE OF INSULIN: ICD-10-CM

## 2023-12-15 DIAGNOSIS — Z12.5 PROSTATE CANCER SCREENING: ICD-10-CM

## 2023-12-15 DIAGNOSIS — Z00.00 ANNUAL PHYSICAL EXAM: ICD-10-CM

## 2023-12-15 LAB
BASOPHILS # BLD AUTO: 0.03 K/UL (ref 0–0.2)
BASOPHILS NFR BLD: 0.5 % (ref 0–1.9)
CHOLEST SERPL-MCNC: 119 MG/DL (ref 120–199)
CHOLEST/HDLC SERPL: 2.6 {RATIO} (ref 2–5)
COMPLEXED PSA SERPL-MCNC: 0.28 NG/ML (ref 0–4)
DIFFERENTIAL METHOD: NORMAL
EOSINOPHIL # BLD AUTO: 0.1 K/UL (ref 0–0.5)
EOSINOPHIL NFR BLD: 2.1 % (ref 0–8)
ERYTHROCYTE [DISTWIDTH] IN BLOOD BY AUTOMATED COUNT: 12.4 % (ref 11.5–14.5)
ESTIMATED AVG GLUCOSE: 192 MG/DL (ref 68–131)
HBA1C MFR BLD: 8.3 % (ref 4–5.6)
HCT VFR BLD AUTO: 43.8 % (ref 40–54)
HDLC SERPL-MCNC: 46 MG/DL (ref 40–75)
HDLC SERPL: 38.7 % (ref 20–50)
HGB BLD-MCNC: 14.9 G/DL (ref 14–18)
IMM GRANULOCYTES # BLD AUTO: 0.01 K/UL (ref 0–0.04)
IMM GRANULOCYTES NFR BLD AUTO: 0.2 % (ref 0–0.5)
LDLC SERPL CALC-MCNC: 58.2 MG/DL (ref 63–159)
LYMPHOCYTES # BLD AUTO: 1.7 K/UL (ref 1–4.8)
LYMPHOCYTES NFR BLD: 26.9 % (ref 18–48)
MCH RBC QN AUTO: 30.5 PG (ref 27–31)
MCHC RBC AUTO-ENTMCNC: 34 G/DL (ref 32–36)
MCV RBC AUTO: 90 FL (ref 82–98)
MONOCYTES # BLD AUTO: 0.4 K/UL (ref 0.3–1)
MONOCYTES NFR BLD: 6 % (ref 4–15)
NEUTROPHILS # BLD AUTO: 4 K/UL (ref 1.8–7.7)
NEUTROPHILS NFR BLD: 64.3 % (ref 38–73)
NONHDLC SERPL-MCNC: 73 MG/DL
NRBC BLD-RTO: 0 /100 WBC
PLATELET # BLD AUTO: 171 K/UL (ref 150–450)
PMV BLD AUTO: 11.1 FL (ref 9.2–12.9)
RBC # BLD AUTO: 4.89 M/UL (ref 4.6–6.2)
TRIGL SERPL-MCNC: 74 MG/DL (ref 30–150)
TSH SERPL DL<=0.005 MIU/L-ACNC: 1.05 UIU/ML (ref 0.4–4)
WBC # BLD AUTO: 6.2 K/UL (ref 3.9–12.7)

## 2023-12-15 PROCEDURE — 84153 ASSAY OF PSA TOTAL: CPT | Performed by: FAMILY MEDICINE

## 2023-12-15 PROCEDURE — 83036 HEMOGLOBIN GLYCOSYLATED A1C: CPT | Performed by: FAMILY MEDICINE

## 2023-12-15 PROCEDURE — 84443 ASSAY THYROID STIM HORMONE: CPT | Mod: PN | Performed by: FAMILY MEDICINE

## 2023-12-15 PROCEDURE — 85025 COMPLETE CBC W/AUTO DIFF WBC: CPT | Mod: PN | Performed by: FAMILY MEDICINE

## 2023-12-15 PROCEDURE — 80053 COMPREHEN METABOLIC PANEL: CPT | Performed by: FAMILY MEDICINE

## 2023-12-15 PROCEDURE — 36415 COLL VENOUS BLD VENIPUNCTURE: CPT | Mod: PN | Performed by: FAMILY MEDICINE

## 2023-12-15 PROCEDURE — 80061 LIPID PANEL: CPT | Performed by: FAMILY MEDICINE

## 2023-12-20 ENCOUNTER — OFFICE VISIT (OUTPATIENT)
Dept: FAMILY MEDICINE | Facility: CLINIC | Age: 53
End: 2023-12-20
Payer: COMMERCIAL

## 2023-12-20 VITALS
BODY MASS INDEX: 27.11 KG/M2 | DIASTOLIC BLOOD PRESSURE: 68 MMHG | SYSTOLIC BLOOD PRESSURE: 118 MMHG | HEIGHT: 70 IN | WEIGHT: 189.38 LBS | HEART RATE: 85 BPM | OXYGEN SATURATION: 99 %

## 2023-12-20 DIAGNOSIS — E11.65 TYPE 2 DIABETES MELLITUS WITH HYPERGLYCEMIA, WITHOUT LONG-TERM CURRENT USE OF INSULIN: ICD-10-CM

## 2023-12-20 DIAGNOSIS — E11.65 TYPE 2 DIABETES MELLITUS WITH HYPERGLYCEMIA, WITHOUT LONG-TERM CURRENT USE OF INSULIN: Primary | ICD-10-CM

## 2023-12-20 DIAGNOSIS — Z23 NEED FOR PROPHYLACTIC VACCINATION AND INOCULATION AGAINST INFLUENZA: ICD-10-CM

## 2023-12-20 DIAGNOSIS — M75.01 ADHESIVE CAPSULITIS OF BOTH SHOULDERS: ICD-10-CM

## 2023-12-20 DIAGNOSIS — M75.02 ADHESIVE CAPSULITIS OF BOTH SHOULDERS: ICD-10-CM

## 2023-12-20 PROCEDURE — 90686 FLU VACCINE (QUAD) GREATER THAN OR EQUAL TO 3YO PRESERVATIVE FREE IM: ICD-10-PCS | Mod: S$GLB,,, | Performed by: FAMILY MEDICINE

## 2023-12-20 PROCEDURE — 90471 FLU VACCINE (QUAD) GREATER THAN OR EQUAL TO 3YO PRESERVATIVE FREE IM: ICD-10-PCS | Mod: S$GLB,,, | Performed by: FAMILY MEDICINE

## 2023-12-20 PROCEDURE — 99999 PR PBB SHADOW E&M-EST. PATIENT-LVL III: CPT | Mod: PBBFAC,,, | Performed by: FAMILY MEDICINE

## 2023-12-20 PROCEDURE — 99214 PR OFFICE/OUTPT VISIT, EST, LEVL IV, 30-39 MIN: ICD-10-PCS | Mod: 25,S$GLB,, | Performed by: FAMILY MEDICINE

## 2023-12-20 PROCEDURE — 90471 IMMUNIZATION ADMIN: CPT | Mod: S$GLB,,, | Performed by: FAMILY MEDICINE

## 2023-12-20 PROCEDURE — 90686 IIV4 VACC NO PRSV 0.5 ML IM: CPT | Mod: S$GLB,,, | Performed by: FAMILY MEDICINE

## 2023-12-20 PROCEDURE — 99999 PR PBB SHADOW E&M-EST. PATIENT-LVL III: ICD-10-PCS | Mod: PBBFAC,,, | Performed by: FAMILY MEDICINE

## 2023-12-20 PROCEDURE — 99214 OFFICE O/P EST MOD 30 MIN: CPT | Mod: 25,S$GLB,, | Performed by: FAMILY MEDICINE

## 2023-12-20 RX ORDER — SEMAGLUTIDE 1.34 MG/ML
INJECTION, SOLUTION SUBCUTANEOUS
COMMUNITY
Start: 2023-10-28

## 2023-12-20 NOTE — PROGRESS NOTES
Assessment & Plan  Problem List Items Addressed This Visit          Endocrine    Type 2 diabetes mellitus with hyperglycemia, without long-term current use of insulin - Primary    Overview     Metformin caused renal insufficiency. Cannot tolerate this medication due to increased creatinine          Relevant Medications    OZEMPIC 1 mg/dose (4 mg/3 mL)    Other Relevant Orders    Comprehensive Metabolic Panel    CBC Auto Differential    Hemoglobin A1C    Lipid Panel    TSH       Orthopedic    Adhesive capsulitis of both shoulders     Other Visit Diagnoses       Need for prophylactic vaccination and inoculation against influenza        Relevant Orders    Influenza - Quadrivalent *Preferred* (6 months+) (PF) (Completed)              Health Maintenance reviewed,.    Follow-up: No follow-ups on file.    ______________________________________________________________________    Chief Complaint  No chief complaint on file.      HPI  Jaswinder Valle is a 53 y.o. male with multiple medical diagnoses as listed in the medical history and problem list that presents for diabetes.  Pt is known to me with last appointment 6/16/2023.    Diabetes:  he has not been able to exercise due to work.  He has not been able to cook.  He plans to change in 2024.  He does not want any changes in his medication. Exercise does make a big difference with his blood glucose control.     Shoulder pain:  he reports continued shoulder pain that began last year.  It has worsened since that time.  He has difficulty with interal rotation.  He has had some improvement.  He can still move his arm and lift above his head, but does have noted discomfort.  Diagnosed with adhesive capsulitis in both his arms.    PAST MEDICAL HISTORY:  Past Medical History:   Diagnosis Date    Diabetes mellitus type 2 without retinopathy 6/30/2016    Diabetes mellitus, type 2     Diabetes type 2, uncontrolled 6/29/2016    Nuclear sclerosis of both eyes 6/30/2016       PAST  SURGICAL HISTORY:  Past Surgical History:   Procedure Laterality Date    COLONOSCOPY N/A 5/16/2018    Procedure: COLONOSCOPY;  Surgeon: Jovany Mendiola MD;  Location: [facility] ENDO;  Service: Endoscopy;  Laterality: N/A;    LEFT HEART CATHETERIZATION Left 7/6/2022    Procedure: Left heart cath;  Surgeon: Km Patel MD;  Location: [facility] CATH LAB;  Service: Cardiology;  Laterality: Left;  RN PREOP ON 6/29/22.--NEED H/P       SOCIAL HISTORY:  Social History     Socioeconomic History    Marital status: Single   Tobacco Use    Smoking status: Never    Smokeless tobacco: Never   Substance and Sexual Activity    Alcohol use: Yes    Drug use: No    Sexual activity: Yes     Partners: Female     Social Determinants of Health     Financial Resource Strain: Low Risk  (12/19/2023)    Overall Financial Resource Strain (CARDIA)     Difficulty of Paying Living Expenses: Not hard at all   Food Insecurity: No Food Insecurity (12/19/2023)    Hunger Vital Sign     Worried About Running Out of Food in the Last Year: Never true     Ran Out of Food in the Last Year: Never true   Transportation Needs: No Transportation Needs (12/19/2023)    PRAPARE - Transportation     Lack of Transportation (Medical): No     Lack of Transportation (Non-Medical): No   Physical Activity: Insufficiently Active (12/19/2023)    Exercise Vital Sign     Days of Exercise per Week: 2 days     Minutes of Exercise per Session: 30 min   Stress: No Stress Concern Present (12/19/2023)    [employer] [place] [employer] - Occupational Stress Questionnaire     Feeling of Stress : Not at all   Social Connections: Unknown (12/19/2023)    Social Connection and Isolation Panel [NHANES]     Frequency of Communication with Friends and Family: Three times a week     Frequency of Social Gatherings with Friends and Family: Once a week     Active Member of Clubs or Organizations: Yes     Attends Club or Organization Meetings: More than 4 times per year     Marital  Status: Living with partner   Housing Stability: Low Risk  (12/19/2023)    Housing Stability Vital Sign     Unable to Pay for Housing in the Last Year: No     Number of Places Lived in the Last Year: 1     Unstable Housing in the Last Year: No       FAMILY HISTORY:  Family History   Problem Relation Age of Onset    Cataracts Mother     Diabetes Mother     No Known Problems Father     No Known Problems Sister     No Known Problems Brother     No Known Problems Maternal Aunt     No Known Problems Maternal Uncle     No Known Problems Paternal Aunt     No Known Problems Paternal Uncle     No Known Problems Maternal Grandmother     No Known Problems Maternal Grandfather     No Known Problems Paternal Grandmother     No Known Problems Paternal Grandfather     Amblyopia Neg Hx     Blindness Neg Hx     Cancer Neg Hx     Hypertension Neg Hx     Macular degeneration Neg Hx     Retinal detachment Neg Hx     Strabismus Neg Hx     Stroke Neg Hx     Thyroid disease Neg Hx     Glaucoma Neg Hx        ALLERGIES AND MEDICATIONS: updated and reviewed.  Review of patient's allergies indicates:   Allergen Reactions    No known drug allergies      Current Outpatient Medications   Medication Sig Dispense Refill    aspirin 81 MG Chew Take 1 tablet (81 mg total) by mouth once daily. 90 tablet 3    atorvastatin (LIPITOR) 40 MG tablet Take 1 tablet (40 mg total) by mouth every evening. 90 tablet 3    losartan (COZAAR) 25 MG tablet Take 1 tablet (25 mg total) by mouth once daily. 90 tablet 3    OZEMPIC 1 mg/dose (4 mg/3 mL)       meloxicam (MOBIC) 7.5 MG tablet Take 7.5 mg by mouth 2 (two) times daily as needed.       No current facility-administered medications for this visit.         ROS  Review of Systems   Constitutional:  Negative for fatigue.   Cardiovascular:  Negative for chest pain.   Endocrine: Negative for polydipsia, polyphagia and polyuria.   Musculoskeletal:  Positive for joint swelling and myalgias.   Skin:  Negative for pallor.  "  Neurological:  Negative for dizziness, tremors, seizures, speech difficulty, weakness and headaches.   Psychiatric/Behavioral:  Negative for confusion. The patient is not nervous/anxious.            Physical Exam  Vitals:    12/20/23 1115   BP: 118/68   Pulse: 85   SpO2: 99%   Weight: 85.9 kg (189 lb 6 oz)   Height: 5' 10" (1.778 m)    Body mass index is 27.17 kg/m².  Weight: 85.9 kg (189 lb 6 oz)   Height: 5' 10" (177.8 cm)   Physical Exam  Vitals reviewed.   Constitutional:       Appearance: Normal appearance. He is well-developed.   HENT:      Head: Normocephalic and atraumatic.      Right Ear: External ear normal.      Left Ear: External ear normal.      Nose: Nose normal.   Eyes:      Extraocular Movements: Extraocular movements intact.      Conjunctiva/sclera: Conjunctivae normal.      Pupils: Pupils are equal, round, and reactive to light.   Cardiovascular:      Rate and Rhythm: Normal rate and regular rhythm.      Heart sounds: Normal heart sounds.   Pulmonary:      Effort: Pulmonary effort is normal.      Breath sounds: Normal breath sounds.   Musculoskeletal:      Cervical back: Normal range of motion.   Skin:     General: Skin is warm and dry.   Neurological:      Mental Status: He is alert and oriented to person, place, and time.   Psychiatric:         Behavior: Behavior normal.           Health Maintenance         Date Due Completion Date    HIV Screening Never done ---    COVID-19 Vaccine (4 - 2023-24 season) 09/01/2023 12/24/2021    Hemoglobin A1c 03/15/2024 12/15/2023    Eye Exam 07/14/2024 7/14/2023    Foot Exam 07/17/2024 7/17/2023 (Done)    Override on 7/17/2023: Done    Diabetes Urine Screening 12/15/2024 12/15/2023    Lipid Panel 12/15/2024 12/15/2023    Aspirin/Antiplatelet Therapy 12/26/2024 12/26/2023    Colorectal Cancer Screening 05/16/2028 5/16/2018    TETANUS VACCINE 09/05/2029 9/5/2019                Patient note was created using [employer].  Any errors in syntax or even information may " not have been identified and edited on initial review prior to signing this note.  Answers submitted by the patient for this visit:  Diabetes Questionnaire (Submitted on 12/19/2023)  Chief Complaint: Diabetes problem  Diabetes type: type 2  MedicAlert ID: No  Disease duration: 3 Years  blurred vision: No  foot paresthesias: No  foot ulcerations: No  visual change: Yes  weight loss: No  Symptom course: stable  hunger: No  mood changes: No  sleepiness: No  sweats: No  blackouts: No  hospitalization: No  nocturnal hypoglycemia: No  required assistance: No  required glucagon: No  CVA: No  heart disease: No  impotence: Yes  nephropathy: No  peripheral neuropathy: No  PVD: No  retinopathy: No  autonomic neuropathy: No  CAD risks: no known risk factors  Current treatments: insulin injections  Treatment compliance: most of the time  Dose schedule: at bedtime  Given by: patient  Injection sites: abdominal wall  Home blood tests: 1-2 x per week  Home urines: <1 x per month  Monitoring compliance: adequate  Blood glucose trend: fluctuating minimally  Weight trend: stable  Current diet: generally healthy  Meal planning: avoidance of concentrated sweets  Exercise: intermittently  Dietitian visit: No  Eye exam current: Yes  Sees podiatrist: Yes

## 2023-12-26 ENCOUNTER — OFFICE VISIT (OUTPATIENT)
Dept: DERMATOLOGY | Facility: CLINIC | Age: 53
End: 2023-12-26
Payer: COMMERCIAL

## 2023-12-26 DIAGNOSIS — L82.0 SEBORRHEIC KERATOSES, INFLAMED: Primary | ICD-10-CM

## 2023-12-26 DIAGNOSIS — L72.0 EPIDERMAL INCLUSION CYST: ICD-10-CM

## 2023-12-26 PROCEDURE — 99203 OFFICE O/P NEW LOW 30 MIN: CPT | Mod: 25,S$GLB,, | Performed by: STUDENT IN AN ORGANIZED HEALTH CARE EDUCATION/TRAINING PROGRAM

## 2023-12-26 PROCEDURE — 99203 PR OFFICE/OUTPT VISIT, NEW, LEVL III, 30-44 MIN: ICD-10-PCS | Mod: 25,S$GLB,, | Performed by: STUDENT IN AN ORGANIZED HEALTH CARE EDUCATION/TRAINING PROGRAM

## 2023-12-26 PROCEDURE — 17110 DESTRUCTION B9 LES UP TO 14: CPT | Mod: S$GLB,,, | Performed by: STUDENT IN AN ORGANIZED HEALTH CARE EDUCATION/TRAINING PROGRAM

## 2023-12-26 PROCEDURE — 17110 PR DESTRUCTION BENIGN LESIONS UP TO 14: ICD-10-PCS | Mod: S$GLB,,, | Performed by: STUDENT IN AN ORGANIZED HEALTH CARE EDUCATION/TRAINING PROGRAM

## 2023-12-26 PROCEDURE — 99999 PR PBB SHADOW E&M-EST. PATIENT-LVL III: CPT | Mod: PBBFAC,,, | Performed by: STUDENT IN AN ORGANIZED HEALTH CARE EDUCATION/TRAINING PROGRAM

## 2023-12-26 PROCEDURE — 99999 PR PBB SHADOW E&M-EST. PATIENT-LVL III: ICD-10-PCS | Mod: PBBFAC,,, | Performed by: STUDENT IN AN ORGANIZED HEALTH CARE EDUCATION/TRAINING PROGRAM

## 2023-12-26 NOTE — PROGRESS NOTES
Subjective:      Patient ID:  Jaswinder Valle is a 53 y.o. male who presents for   Chief Complaint   Patient presents with    Spot     R-eye lid      Spot - Initial  Affected locations: R-eye lid.  Duration: 1 year  Signs / symptoms: itching  Severity: mild  Timing: constant  Aggravated by: nothing  Relieving factors/Treatments tried: nothing      Review of Systems   Skin:  Positive for activity-related sunscreen use and wears hat. Negative for daily sunscreen use and recent sunburn.   Hematologic/Lymphatic: Does not bruise/bleed easily.       Objective:   Physical Exam   Constitutional: He appears well-developed and well-nourished. No distress.   Neurological: He is alert and oriented to person, place, and time. He is not disoriented.   Psychiatric: He has a normal mood and affect.   Skin:   Areas Examined (abnormalities noted in diagram):   Head / Face Inspection Performed                 Diagram Legend     Erythematous scaling macule/papule c/w actinic keratosis       Vascular papule c/w angioma      Pigmented verrucoid papule/plaque c/w seborrheic keratosis      Yellow umbilicated papule c/w sebaceous hyperplasia      Irregularly shaped tan macule c/w lentigo     1-2 mm smooth white papules consistent with Milia      Movable subcutaneous cyst with punctum c/w epidermal inclusion cyst      Subcutaneous movable cyst c/w pilar cyst      Firm pink to brown papule c/w dermatofibroma      Pedunculated fleshy papule(s) c/w skin tag(s)      Evenly pigmented macule c/w junctional nevus     Mildly variegated pigmented, slightly irregular-bordered macule c/w mildly atypical nevus      Flesh colored to evenly pigmented papule c/w intradermal nevus       Pink pearly papule/plaque c/w basal cell carcinoma      Erythematous hyperkeratotic cursted plaque c/w SCC      Surgical scar with no sign of skin cancer recurrence      Open and closed comedones      Inflammatory papules and pustules      Verrucoid papule consistent  consistent with wart     Erythematous eczematous patches and plaques     Dystrophic onycholytic nail with subungual debris c/w onychomycosis     Umbilicated papule    Erythematous-base heme-crusted tan verrucoid plaque consistent with inflamed seborrheic keratosis     Erythematous Silvery Scaling Plaque c/w Psoriasis     See annotation      Assessment / Plan:        Seborrheic keratoses, inflamed--right lower eyelid  - lesion was itchy    Procedure note for destruction via hyfrecation and curettage:    Verbal consent obtained. Risks of recurrence and scarring discussed.   1 lesions cleaned with alcohol and anesthetized with 1% lidocaine with epinephrine. Areas then lightly hyfrecated until turned slightly grey.  No complications.   Areas dressed with Vaseline jelly and bandage. Wound care discussed.    Pt tolerated well    Epidermal inclusion cyst--left neck  Reassurance given to patient on benign nature.  Discussed treatment options - excision vs observation. If lesion is not treated, it may grow and become intermittently inflamed. Discussed RBSE of surgery including scar, infection, bleeding and recurrence. Patient voiced understanding and would like to    defer treatment for now.    At current size could treat with punch excision           Follow up if symptoms worsen or fail to improve.

## 2023-12-26 NOTE — PATIENT INSTRUCTIONS

## 2024-06-06 ENCOUNTER — LAB VISIT (OUTPATIENT)
Dept: LAB | Facility: HOSPITAL | Age: 54
End: 2024-06-06
Attending: FAMILY MEDICINE
Payer: COMMERCIAL

## 2024-06-06 DIAGNOSIS — E11.65 TYPE 2 DIABETES MELLITUS WITH HYPERGLYCEMIA, WITHOUT LONG-TERM CURRENT USE OF INSULIN: ICD-10-CM

## 2024-06-06 LAB
ALBUMIN SERPL BCP-MCNC: 4.1 G/DL (ref 3.5–5.2)
ALP SERPL-CCNC: 88 U/L (ref 38–126)
ALT SERPL W/O P-5'-P-CCNC: 56 U/L (ref 10–44)
ANION GAP SERPL CALC-SCNC: 8 MMOL/L (ref 8–16)
AST SERPL-CCNC: 29 U/L (ref 15–46)
BASOPHILS # BLD AUTO: 0.02 K/UL (ref 0–0.2)
BASOPHILS NFR BLD: 0.3 % (ref 0–1.9)
BILIRUB SERPL-MCNC: 2 MG/DL (ref 0.1–1)
CALCIUM SERPL-MCNC: 8.8 MG/DL (ref 8.7–10.5)
CHLORIDE SERPL-SCNC: 105 MMOL/L (ref 95–110)
CHOLEST SERPL-MCNC: 119 MG/DL (ref 120–199)
CHOLEST/HDLC SERPL: 2.5 {RATIO} (ref 2–5)
CO2 SERPL-SCNC: 27 MMOL/L (ref 23–29)
CREAT SERPL-MCNC: 1.18 MG/DL (ref 0.5–1.4)
DIFFERENTIAL METHOD BLD: NORMAL
EOSINOPHIL # BLD AUTO: 0.1 K/UL (ref 0–0.5)
EOSINOPHIL NFR BLD: 2.2 % (ref 0–8)
ERYTHROCYTE [DISTWIDTH] IN BLOOD BY AUTOMATED COUNT: 13.1 % (ref 11.5–14.5)
EST. GFR  (NO RACE VARIABLE): >60 ML/MIN/1.73 M^2
ESTIMATED AVG GLUCOSE: 189 MG/DL (ref 68–131)
GLUCOSE SERPL-MCNC: 208 MG/DL (ref 70–110)
HBA1C MFR BLD: 8.2 % (ref 4–5.6)
HCT VFR BLD AUTO: 41.8 % (ref 40–54)
HDLC SERPL-MCNC: 47 MG/DL (ref 40–75)
HDLC SERPL: 39.5 % (ref 20–50)
HGB BLD-MCNC: 14.1 G/DL (ref 14–18)
IMM GRANULOCYTES # BLD AUTO: 0.02 K/UL (ref 0–0.04)
IMM GRANULOCYTES NFR BLD AUTO: 0.3 % (ref 0–0.5)
LDLC SERPL CALC-MCNC: 55.8 MG/DL (ref 63–159)
LYMPHOCYTES # BLD AUTO: 1.7 K/UL (ref 1–4.8)
LYMPHOCYTES NFR BLD: 28.5 % (ref 18–48)
MCH RBC QN AUTO: 30.4 PG (ref 27–31)
MCHC RBC AUTO-ENTMCNC: 33.7 G/DL (ref 32–36)
MCV RBC AUTO: 90 FL (ref 82–98)
MONOCYTES # BLD AUTO: 0.4 K/UL (ref 0.3–1)
MONOCYTES NFR BLD: 6.7 % (ref 4–15)
NEUTROPHILS # BLD AUTO: 3.6 K/UL (ref 1.8–7.7)
NEUTROPHILS NFR BLD: 62 % (ref 38–73)
NONHDLC SERPL-MCNC: 72 MG/DL
NRBC BLD-RTO: 0 /100 WBC
PLATELET # BLD AUTO: 163 K/UL (ref 150–450)
PMV BLD AUTO: 10.8 FL (ref 9.2–12.9)
POTASSIUM SERPL-SCNC: 4.8 MMOL/L (ref 3.5–5.1)
PROT SERPL-MCNC: 6.9 G/DL (ref 6–8.4)
RBC # BLD AUTO: 4.64 M/UL (ref 4.6–6.2)
SODIUM SERPL-SCNC: 140 MMOL/L (ref 136–145)
TRIGL SERPL-MCNC: 81 MG/DL (ref 30–150)
TSH SERPL DL<=0.005 MIU/L-ACNC: 1.87 UIU/ML (ref 0.4–4)
UUN UR-MCNC: 23 MG/DL (ref 2–20)
WBC # BLD AUTO: 5.78 K/UL (ref 3.9–12.7)

## 2024-06-06 PROCEDURE — 85025 COMPLETE CBC W/AUTO DIFF WBC: CPT | Mod: PN | Performed by: FAMILY MEDICINE

## 2024-06-06 PROCEDURE — 83036 HEMOGLOBIN GLYCOSYLATED A1C: CPT | Performed by: FAMILY MEDICINE

## 2024-06-06 PROCEDURE — 80061 LIPID PANEL: CPT | Performed by: FAMILY MEDICINE

## 2024-06-06 PROCEDURE — 36415 COLL VENOUS BLD VENIPUNCTURE: CPT | Mod: PN | Performed by: FAMILY MEDICINE

## 2024-06-06 PROCEDURE — 80053 COMPREHEN METABOLIC PANEL: CPT | Mod: PN | Performed by: FAMILY MEDICINE

## 2024-06-06 PROCEDURE — 84443 ASSAY THYROID STIM HORMONE: CPT | Mod: PN | Performed by: FAMILY MEDICINE

## 2024-06-06 RX ORDER — GLIPIZIDE 5 MG/1
5 TABLET, FILM COATED, EXTENDED RELEASE ORAL
Qty: 90 TABLET | Refills: 3 | Status: SHIPPED | OUTPATIENT
Start: 2024-06-06 | End: 2025-06-06

## 2024-06-17 ENCOUNTER — OFFICE VISIT (OUTPATIENT)
Dept: FAMILY MEDICINE | Facility: CLINIC | Age: 54
End: 2024-06-17
Payer: COMMERCIAL

## 2024-06-17 VITALS
HEART RATE: 79 BPM | HEIGHT: 70 IN | TEMPERATURE: 98 F | SYSTOLIC BLOOD PRESSURE: 110 MMHG | OXYGEN SATURATION: 99 % | BODY MASS INDEX: 26.8 KG/M2 | DIASTOLIC BLOOD PRESSURE: 80 MMHG | WEIGHT: 187.19 LBS

## 2024-06-17 DIAGNOSIS — Z12.5 PROSTATE CANCER SCREENING: ICD-10-CM

## 2024-06-17 DIAGNOSIS — E11.65 TYPE 2 DIABETES MELLITUS WITH HYPERGLYCEMIA, WITHOUT LONG-TERM CURRENT USE OF INSULIN: ICD-10-CM

## 2024-06-17 DIAGNOSIS — Z00.00 ANNUAL PHYSICAL EXAM: Primary | ICD-10-CM

## 2024-06-17 PROCEDURE — 99396 PREV VISIT EST AGE 40-64: CPT | Mod: S$GLB,,, | Performed by: FAMILY MEDICINE

## 2024-06-17 PROCEDURE — 99999 PR PBB SHADOW E&M-EST. PATIENT-LVL IV: CPT | Mod: PBBFAC,,, | Performed by: FAMILY MEDICINE

## 2024-06-17 RX ORDER — SEMAGLUTIDE 1.34 MG/ML
1 INJECTION, SOLUTION SUBCUTANEOUS
Qty: 9 ML | Refills: 3 | Status: SHIPPED | OUTPATIENT
Start: 2024-06-17 | End: 2025-06-17

## 2024-06-17 NOTE — PROGRESS NOTES
Assessment & Plan  Problem List Items Addressed This Visit          Endocrine    Type 2 diabetes mellitus with hyperglycemia, without long-term current use of insulin    Overview     Metformin caused renal insufficiency. Cannot tolerate this medication due to increased creatinine          Relevant Medications    OZEMPIC 1 mg/dose (4 mg/3 mL)    Other Relevant Orders    Comprehensive Metabolic Panel    CBC Auto Differential    Hemoglobin A1C    Lipid Panel    TSH    PSA, SCREENING     Other Visit Diagnoses       Annual physical exam    -  Primary    Relevant Orders    Comprehensive Metabolic Panel    CBC Auto Differential    Hemoglobin A1C    Lipid Panel    TSH    PSA, SCREENING    Prostate cancer screening        Relevant Orders    PSA, SCREENING         I addressed all major concerns as it related to health maintenance.  All were ordered and scheduled based on the patients wishes.  Any additional health maintenance will be readdressed at the next physical if declined or deferred by the patient.    Assessment & Plan  1. Diabetes Mellitus.  The patient's Hemoglobin A1c remains suboptimal, despite a reduction in blood glucose levels in 2022. His dietary habits are not conducive to optimal health. The addition of glipizide to his treatment regimen is due to occasional postprandial hyperglycemic episodes, which we aim to prevent. His kidney function, while currently satisfactory, is a potential concern. The patient has been advised to adopt a meal plan, incorporating healthier breakfast options such as boiled eggs, oatmeal, and vegetables. He has been cautioned against consuming cereals and encouraged to switch from 2 percent milk to almond milk. Over-the-counter potassium supplements have been recommended for cramping. He has been instructed to commence glipizide therapy, taking one tablet daily after breakfast. If breakfast is skipped, the medication should also be omitted. Regular monitoring of his kidney function  will be conducted. Should there be a deterioration in his kidney function, a referral to a nephrologist will be necessary.    2. Cancer screening.  The patient's Prostate-Specific Antigen (PSA) test is scheduled for 12/2024. The patient has been reassured that his Complete Blood Count (CBC) does not indicate any abnormalities suggestive of leukemia or other blood cancers.    3. Arthralgia.  The patient has been advised to consider over-the-counter glucosamine and chondroitin supplements, which may aid in joint lubrication and potentially alleviate arthritis symptoms. Turmeric, either in pill form or as a spice, has also been suggested as a potential treatment option.    Follow-up  The patient is scheduled for a follow-up visit in 6 months, at which time repeat blood work will be conducted.    Results  Laboratory Studies  Hemoglobin A1c is 8. Blood sugar levels have been consistently in the 200s, with spikes close to 300. Average blood sugar is 189. Kidney function is stable but concerning.      Health Maintenance reviewed.      ______________________________________________________________________    Chief Complaint  Chief Complaint   Patient presents with    Annual Exam       HPI  Jaswinder Valle is a 54 y.o. male with multiple medical diagnoses as listed in the medical history and problem list that presents for annual exam.  Pt is known to me with last appointment 12/20/2023.    History of Present Illness  The patient presents for evaluation of multiple medical concerns.    The patient has been experiencing elevated blood glucose levels, which he attributes to his demanding work schedule. He reports difficulty in adhering to a healthy diet due to time constraints. His dietary preferences include brown rice, salads, Raisin Bran, and Cheerios with 2 percent milk. He also consumes bananas to alleviate hand cramping, particularly during hot weather. His current medication regimen includes Ozempic, which he tolerates  well, and he expresses no desire for further weight loss. His diet is rich in chicken, meats, white beans, red beans, and green beans, but excludes black beans. He has not yet initiated glipizide therapy.    The patient expresses concern about potential cancer risk and inquires about the possibility of detecting cancer through blood work.    The patient seeks advice on non-prescription treatments for joint pain.           PAST MEDICAL HISTORY:  Past Medical History:   Diagnosis Date    Diabetes mellitus type 2 without retinopathy 6/30/2016    Diabetes mellitus, type 2     Diabetes type 2, uncontrolled 6/29/2016    Nuclear sclerosis of both eyes 6/30/2016       PAST SURGICAL HISTORY:  Past Surgical History:   Procedure Laterality Date    COLONOSCOPY N/A 5/16/2018    Procedure: COLONOSCOPY;  Surgeon: Jovany Mendiola MD;  Location: [facility] ENDO;  Service: Endoscopy;  Laterality: N/A;    LEFT HEART CATHETERIZATION Left 7/6/2022    Procedure: Left heart cath;  Surgeon: Km Patel MD;  Location: [facility] CATH LAB;  Service: Cardiology;  Laterality: Left;  RN PREOP ON 6/29/22.--NEED H/P       SOCIAL HISTORY:  Social History     Socioeconomic History    Marital status: Single   Tobacco Use    Smoking status: Never    Smokeless tobacco: Never   Substance and Sexual Activity    Alcohol use: Yes    Drug use: No    Sexual activity: Yes     Partners: Female     Social Determinants of Health     Financial Resource Strain: Low Risk  (12/19/2023)    Overall Financial Resource Strain (CARDIA)     Difficulty of Paying Living Expenses: Not hard at all   Food Insecurity: No Food Insecurity (12/19/2023)    Hunger Vital Sign     Worried About Running Out of Food in the Last Year: Never true     Ran Out of Food in the Last Year: Never true   Transportation Needs: No Transportation Needs (12/19/2023)    PRAPARE - Transportation     Lack of Transportation (Medical): No     Lack of Transportation (Non-Medical): No   Physical Activity:  Insufficiently Active (12/19/2023)    Exercise Vital Sign     Days of Exercise per Week: 2 days     Minutes of Exercise per Session: 30 min   Stress: No Stress Concern Present (12/19/2023)    [employer] [place] [employer] - Occupational Stress Questionnaire     Feeling of Stress : Not at all   Housing Stability: Low Risk  (12/19/2023)    Housing Stability Vital Sign     Unable to Pay for Housing in the Last Year: No     Number of Places Lived in the Last Year: 1     Unstable Housing in the Last Year: No       FAMILY HISTORY:  Family History   Problem Relation Name Age of Onset    Cataracts Mother      Diabetes Mother      No Known Problems Father      No Known Problems Sister      No Known Problems Brother      No Known Problems Maternal Aunt      No Known Problems Maternal Uncle      No Known Problems Paternal Aunt      No Known Problems Paternal Uncle      No Known Problems Maternal Grandmother      No Known Problems Maternal Grandfather      No Known Problems Paternal Grandmother      No Known Problems Paternal Grandfather      Amblyopia Neg Hx      Blindness Neg Hx      Cancer Neg Hx      Hypertension Neg Hx      Macular degeneration Neg Hx      Retinal detachment Neg Hx      Strabismus Neg Hx      Stroke Neg Hx      Thyroid disease Neg Hx      Glaucoma Neg Hx         ALLERGIES AND MEDICATIONS: updated and reviewed.  Review of patient's allergies indicates:   Allergen Reactions    No known drug allergies      Current Outpatient Medications   Medication Sig Dispense Refill    aspirin 81 MG Chew Take 1 tablet (81 mg total) by mouth once daily. 90 tablet 3    atorvastatin (LIPITOR) 40 MG tablet Take 1 tablet (40 mg total) by mouth every evening. 90 tablet 3    glipiZIDE 5 MG TR24 Take 1 tablet (5 mg total) by mouth daily with breakfast. 90 tablet 3    losartan (COZAAR) 25 MG tablet Take 1 tablet (25 mg total) by mouth once daily. 90 tablet 3    meloxicam (MOBIC) 7.5 MG tablet Take 7.5 mg by mouth 2 (two)  "times daily as needed.      OZEMPIC 1 mg/dose (4 mg/3 mL) Inject 1 mg into the skin every 7 days. 9 mL 3     No current facility-administered medications for this visit.         ROS  Review of Systems   Constitutional:  Negative for activity change, appetite change, fatigue, fever and unexpected weight change.   HENT:  Negative for congestion and facial swelling.    Eyes:  Negative for visual disturbance.   Respiratory:  Negative for chest tightness, shortness of breath, wheezing and stridor.    Cardiovascular:  Negative for chest pain, palpitations and leg swelling.   Gastrointestinal:  Negative for abdominal distention, abdominal pain, constipation, diarrhea, nausea and vomiting.   Endocrine: Negative for cold intolerance, heat intolerance, polydipsia and polyuria.   Skin: Negative.    Allergic/Immunologic: Negative.    Neurological:  Negative for dizziness, light-headedness, numbness and headaches.   Psychiatric/Behavioral:  Negative for agitation and decreased concentration.            Physical Exam  Vitals:    06/17/24 1334   BP: 110/80   Pulse: 79   Temp: 98.3 °F (36.8 °C)   TempSrc: Oral   SpO2: 99%   Weight: 84.9 kg (187 lb 2.7 oz)   Height: 5' 10" (1.778 m)    Body mass index is 26.86 kg/m².  Weight: 84.9 kg (187 lb 2.7 oz)   Height: 5' 10" (177.8 cm)   Physical Exam  Vitals reviewed.   Constitutional:       Appearance: Normal appearance. He is well-developed.   HENT:      Head: Normocephalic and atraumatic.      Right Ear: External ear normal.      Left Ear: External ear normal.      Nose: Nose normal.   Eyes:      Extraocular Movements: Extraocular movements intact.      Conjunctiva/sclera: Conjunctivae normal.      Pupils: Pupils are equal, round, and reactive to light.   Cardiovascular:      Rate and Rhythm: Normal rate and regular rhythm.      Heart sounds: Normal heart sounds.   Pulmonary:      Effort: Pulmonary effort is normal.      Breath sounds: Normal breath sounds.   Musculoskeletal:      " Cervical back: Normal range of motion.   Skin:     General: Skin is warm and dry.   Neurological:      Mental Status: He is alert and oriented to person, place, and time.   Psychiatric:         Behavior: Behavior normal.        Physical Exam  The patient's ears are clear with no wax.  The patient's lungs sound good.  The patient's heart sounds good.         Health Maintenance         Date Due Completion Date    HIV Screening Never done ---    COVID-19 Vaccine (4 - 2023-24 season) 09/01/2023 12/24/2021    Eye Exam 07/14/2024 7/14/2023    Foot Exam 07/17/2024 7/17/2023 (Done)    Override on 7/17/2023: Done    Hemoglobin A1c 09/06/2024 6/6/2024    Diabetes Urine Screening 12/15/2024 12/15/2023    Lipid Panel 06/06/2025 6/6/2024    Colorectal Cancer Screening 05/16/2028 5/16/2018    TETANUS VACCINE 09/05/2029 9/5/2019                Patient note was created using [employer].  Any errors in syntax or even information may not have been identified and edited on initial review prior to signing this note.

## 2024-07-02 ENCOUNTER — PATIENT MESSAGE (OUTPATIENT)
Dept: CARDIOLOGY | Facility: CLINIC | Age: 54
End: 2024-07-02
Payer: COMMERCIAL

## 2024-07-23 RX ORDER — LOSARTAN POTASSIUM 25 MG/1
25 TABLET ORAL
Qty: 30 TABLET | Refills: 0 | Status: SHIPPED | OUTPATIENT
Start: 2024-07-23

## 2024-07-23 RX ORDER — ATORVASTATIN CALCIUM 40 MG/1
40 TABLET, FILM COATED ORAL NIGHTLY
Qty: 30 TABLET | Refills: 0 | Status: SHIPPED | OUTPATIENT
Start: 2024-07-23

## 2024-08-05 DIAGNOSIS — E11.65 TYPE 2 DIABETES MELLITUS WITH HYPERGLYCEMIA, WITHOUT LONG-TERM CURRENT USE OF INSULIN: ICD-10-CM

## 2024-08-05 RX ORDER — SEMAGLUTIDE 1.34 MG/ML
INJECTION, SOLUTION SUBCUTANEOUS
Qty: 9 ML | Refills: 1 | Status: SHIPPED | OUTPATIENT
Start: 2024-08-05

## 2024-08-19 ENCOUNTER — PATIENT MESSAGE (OUTPATIENT)
Dept: ADMINISTRATIVE | Facility: HOSPITAL | Age: 54
End: 2024-08-19
Payer: COMMERCIAL

## 2024-08-21 RX ORDER — NAPROXEN SODIUM 220 MG/1
TABLET, FILM COATED ORAL
Qty: 30 TABLET | Refills: 0 | Status: SHIPPED | OUTPATIENT
Start: 2024-08-21

## 2024-09-09 RX ORDER — LOSARTAN POTASSIUM 25 MG/1
25 TABLET ORAL
Qty: 30 TABLET | Refills: 0 | Status: SHIPPED | OUTPATIENT
Start: 2024-09-09

## 2024-09-09 RX ORDER — ATORVASTATIN CALCIUM 40 MG/1
40 TABLET, FILM COATED ORAL NIGHTLY
Qty: 30 TABLET | Refills: 0 | Status: SHIPPED | OUTPATIENT
Start: 2024-09-09

## 2024-09-20 NOTE — TELEPHONE ENCOUNTER
----- Message from Len Lerner sent at 8/31/2022 11:19 AM CDT -----  Type: Patient Call Back    Who called:Self    What is the request in detail: Pt calling regarding medication refill that was sent in. Please call    Can the clinic reply by [facility]? no    Would the patient rather a call back or a response via [facility]? Call back    Best call back number: [phone] (home)       Additional Information:         [de-identified] : Open reduction and internal fixation of L1 fracture. Posterior releasing osteotomy, T12-L1. Exploration of previous posterior spinal fusion L1. Posterior spinal fusion T10-T11, T11-T12, T12-L1, L1-L2. Posterior segmental instrumentation T10 to L2. Vertebral augmentation with methylmethacrylate cement, T10 and T11. Use of local autograft bone and allograft bone to facilitate fusion. [de-identified] : 08/12/2024

## 2024-10-07 ENCOUNTER — TELEPHONE (OUTPATIENT)
Dept: FAMILY MEDICINE | Facility: CLINIC | Age: 54
End: 2024-10-07
Payer: COMMERCIAL

## 2024-10-08 NOTE — TELEPHONE ENCOUNTER
Spoke with patient regarding annual visit, he's not due until June 2025 for his annual. He will call back to schedule once he view his work schedule

## 2024-10-08 NOTE — TELEPHONE ENCOUNTER
----- Message from Jasoncodey sent at 10/7/2024 11:38 AM CDT -----  Regarding: Self  Patient is requesting a sooner appointment.  Patient declined first available appointment listed as well as another facility and provider .  Patient will not accept being placed on the waitlist and is requesting a message be sent to doctor.    Name of Caller: Self     When is the first available appointment?  March 21    Symptoms:  annual     Would the patient rather a call back or a response via [facility]?  Call back     Best Call Back Number: .[phone]      Additional Information:

## 2024-11-20 ENCOUNTER — TELEPHONE (OUTPATIENT)
Dept: FAMILY MEDICINE | Facility: CLINIC | Age: 54
End: 2024-11-20
Payer: COMMERCIAL

## 2024-11-20 NOTE — TELEPHONE ENCOUNTER
----- Message from Diamond sent at 11/20/2024  9:10 AM CST -----  Who called: self      What is the request in detail: pt wants to get additional test done      Can the clinic reply by [facility]? No      Would the patient rather a call back or a response via [facility]? Call Back      Best call back number: .[phone]       Additional Information:

## 2024-11-20 NOTE — TELEPHONE ENCOUNTER
----- Message from [facility] [employer] sent at 11/20/2024  4:16 PM CST -----  Who Called:   Pt  Who Left Message for Patient:   Stella Willams LPN  Does the patient know what this is regarding?:  unsure  Would the patient rather a call back or a response via [facility]?  Call back     Best Call Back Number:   Telephone Information:  Mobile          [phone]    Additional Information:     Thank you.

## 2024-11-21 ENCOUNTER — TELEPHONE (OUTPATIENT)
Dept: FAMILY MEDICINE | Facility: CLINIC | Age: 54
End: 2024-11-21
Payer: COMMERCIAL

## 2024-11-21 NOTE — TELEPHONE ENCOUNTER
----- Message from Clifford sent at 11/21/2024  3:18 PM CST -----   Name of Who is Calling:     What is the request in detail:  patient request call back in reference to lab orders / patient request add PSA  and A1c Please contact to further discuss and advise      Can the clinic reply by [facility]:     What Number to Call Back if not in [facility]:  [phone]

## 2024-11-25 ENCOUNTER — TELEPHONE (OUTPATIENT)
Dept: FAMILY MEDICINE | Facility: CLINIC | Age: 54
End: 2024-11-25
Payer: COMMERCIAL

## 2024-11-25 NOTE — TELEPHONE ENCOUNTER
----- Message from Leilani sent at 11/25/2024 12:36 PM CST -----  Regarding: self    Type: Patient Call Back     Who called:self     What is the request in detail:pt is calling requesting to add lab orders for his upcomming appt. I do see the previous msg requesting the A1c and I did link that order but there are other the pt would like. Please call to advise     Can the clinic reply by [facility]? No     Would the patient rather a call back or a response via [facility]? Call back     Best call back number: [phone]     Additional Information:     Thank you.

## 2024-12-04 ENCOUNTER — TELEPHONE (OUTPATIENT)
Dept: CARDIOLOGY | Facility: CLINIC | Age: 54
End: 2024-12-04
Payer: COMMERCIAL

## 2024-12-04 NOTE — TELEPHONE ENCOUNTER
----- Message from Flor sent at 12/4/2024 11:03 AM CST -----  Regarding: patient call back  Type: Patient Call Back    Who called: Self      What is the request in detail: asked for a call back to get a status update for atorvastatin (LIPITOR) 40 MG tablet [Km Patel MD]         losartan (COZAAR) 25 MG tablet [Km Patel MD]      Can the clinic reply by [facility]? No     Would the patient rather a call back or a response via [facility]? Call     Best call back number: .[phone]

## 2024-12-04 NOTE — TELEPHONE ENCOUNTER
Informed patient that no refills will be approved until he is seen at the clinic since he has not seen Dr. Patel since 2022. Patient voiced understanding, did not request further assistance.

## 2024-12-17 ENCOUNTER — LAB VISIT (OUTPATIENT)
Dept: LAB | Facility: HOSPITAL | Age: 54
End: 2024-12-17
Attending: FAMILY MEDICINE
Payer: COMMERCIAL

## 2024-12-17 DIAGNOSIS — Z00.00 ANNUAL PHYSICAL EXAM: ICD-10-CM

## 2024-12-17 DIAGNOSIS — Z12.5 PROSTATE CANCER SCREENING: ICD-10-CM

## 2024-12-17 DIAGNOSIS — E11.65 TYPE 2 DIABETES MELLITUS WITH HYPERGLYCEMIA, WITHOUT LONG-TERM CURRENT USE OF INSULIN: ICD-10-CM

## 2024-12-17 LAB
ALBUMIN SERPL BCP-MCNC: 4.3 G/DL (ref 3.5–5.2)
ALP SERPL-CCNC: 77 U/L (ref 38–126)
ALT SERPL W/O P-5'-P-CCNC: 53 U/L (ref 10–44)
ANION GAP SERPL CALC-SCNC: 7 MMOL/L (ref 8–16)
AST SERPL-CCNC: 36 U/L (ref 15–46)
BASOPHILS # BLD AUTO: 0.02 K/UL (ref 0–0.2)
BASOPHILS NFR BLD: 0.5 % (ref 0–1.9)
BILIRUB SERPL-MCNC: 2 MG/DL (ref 0.1–1)
CALCIUM SERPL-MCNC: 9.1 MG/DL (ref 8.7–10.5)
CHLORIDE SERPL-SCNC: 101 MMOL/L (ref 95–110)
CHOLEST SERPL-MCNC: 202 MG/DL (ref 120–199)
CHOLEST/HDLC SERPL: 4 {RATIO} (ref 2–5)
CO2 SERPL-SCNC: 29 MMOL/L (ref 23–29)
COMPLEXED PSA SERPL-MCNC: 0.4 NG/ML (ref 0–4)
CREAT SERPL-MCNC: 1.2 MG/DL (ref 0.5–1.4)
DIFFERENTIAL METHOD BLD: NORMAL
EOSINOPHIL # BLD AUTO: 0.1 K/UL (ref 0–0.5)
EOSINOPHIL NFR BLD: 2.3 % (ref 0–8)
ERYTHROCYTE [DISTWIDTH] IN BLOOD BY AUTOMATED COUNT: 13.1 % (ref 11.5–14.5)
EST. GFR  (NO RACE VARIABLE): >60 ML/MIN/1.73 M^2
ESTIMATED AVG GLUCOSE: 171 MG/DL (ref 68–131)
GLUCOSE SERPL-MCNC: 167 MG/DL (ref 70–110)
HBA1C MFR BLD: 7.6 % (ref 4–5.6)
HCT VFR BLD AUTO: 42.8 % (ref 40–54)
HDLC SERPL-MCNC: 50 MG/DL (ref 40–75)
HDLC SERPL: 24.8 % (ref 20–50)
HGB BLD-MCNC: 14.8 G/DL (ref 14–18)
IMM GRANULOCYTES # BLD AUTO: 0.01 K/UL (ref 0–0.04)
IMM GRANULOCYTES NFR BLD AUTO: 0.2 % (ref 0–0.5)
LDLC SERPL CALC-MCNC: 129 MG/DL (ref 63–159)
LYMPHOCYTES # BLD AUTO: 1.3 K/UL (ref 1–4.8)
LYMPHOCYTES NFR BLD: 30.9 % (ref 18–48)
MCH RBC QN AUTO: 30.5 PG (ref 27–31)
MCHC RBC AUTO-ENTMCNC: 34.6 G/DL (ref 32–36)
MCV RBC AUTO: 88 FL (ref 82–98)
MONOCYTES # BLD AUTO: 0.3 K/UL (ref 0.3–1)
MONOCYTES NFR BLD: 6.3 % (ref 4–15)
NEUTROPHILS # BLD AUTO: 2.6 K/UL (ref 1.8–7.7)
NEUTROPHILS NFR BLD: 59.8 % (ref 38–73)
NONHDLC SERPL-MCNC: 152 MG/DL
NRBC BLD-RTO: 0 /100 WBC
PLATELET # BLD AUTO: 166 K/UL (ref 150–450)
PMV BLD AUTO: 11 FL (ref 9.2–12.9)
POTASSIUM SERPL-SCNC: 4.6 MMOL/L (ref 3.5–5.1)
PROT SERPL-MCNC: 7.4 G/DL (ref 6–8.4)
RBC # BLD AUTO: 4.85 M/UL (ref 4.6–6.2)
SODIUM SERPL-SCNC: 137 MMOL/L (ref 136–145)
TRIGL SERPL-MCNC: 115 MG/DL (ref 30–150)
TSH SERPL DL<=0.005 MIU/L-ACNC: 1.35 UIU/ML (ref 0.4–4)
UUN UR-MCNC: 19 MG/DL (ref 2–20)
WBC # BLD AUTO: 4.3 K/UL (ref 3.9–12.7)

## 2024-12-17 PROCEDURE — 80061 LIPID PANEL: CPT | Performed by: FAMILY MEDICINE

## 2024-12-17 PROCEDURE — 36415 COLL VENOUS BLD VENIPUNCTURE: CPT | Mod: PN | Performed by: FAMILY MEDICINE

## 2024-12-17 PROCEDURE — 84443 ASSAY THYROID STIM HORMONE: CPT | Mod: PN | Performed by: FAMILY MEDICINE

## 2024-12-17 PROCEDURE — 85025 COMPLETE CBC W/AUTO DIFF WBC: CPT | Mod: PN | Performed by: FAMILY MEDICINE

## 2024-12-17 PROCEDURE — 80053 COMPREHEN METABOLIC PANEL: CPT | Mod: PN | Performed by: FAMILY MEDICINE

## 2024-12-17 PROCEDURE — 83036 HEMOGLOBIN GLYCOSYLATED A1C: CPT | Performed by: FAMILY MEDICINE

## 2024-12-17 PROCEDURE — 84153 ASSAY OF PSA TOTAL: CPT | Performed by: FAMILY MEDICINE

## 2024-12-18 ENCOUNTER — OFFICE VISIT (OUTPATIENT)
Dept: OPTOMETRY | Facility: CLINIC | Age: 54
End: 2024-12-18
Payer: COMMERCIAL

## 2024-12-18 DIAGNOSIS — H52.4 HYPEROPIA WITH ASTIGMATISM AND PRESBYOPIA, BILATERAL: ICD-10-CM

## 2024-12-18 DIAGNOSIS — E11.9 TYPE 2 DIABETES MELLITUS WITHOUT RETINOPATHY: Primary | ICD-10-CM

## 2024-12-18 DIAGNOSIS — H04.123 DRY EYE SYNDROME OF BOTH EYES: ICD-10-CM

## 2024-12-18 DIAGNOSIS — E11.9 TYPE 2 DIABETES MELLITUS WITHOUT COMPLICATION: ICD-10-CM

## 2024-12-18 DIAGNOSIS — H52.03 HYPEROPIA WITH ASTIGMATISM AND PRESBYOPIA, BILATERAL: ICD-10-CM

## 2024-12-18 DIAGNOSIS — H52.203 HYPEROPIA WITH ASTIGMATISM AND PRESBYOPIA, BILATERAL: ICD-10-CM

## 2024-12-18 PROCEDURE — 92014 COMPRE OPH EXAM EST PT 1/>: CPT | Mod: S$GLB,,, | Performed by: OPTOMETRIST

## 2024-12-18 PROCEDURE — 99999 PR PBB SHADOW E&M-EST. PATIENT-LVL II: CPT | Mod: PBBFAC,,, | Performed by: OPTOMETRIST

## 2024-12-18 PROCEDURE — 92015 DETERMINE REFRACTIVE STATE: CPT | Mod: S$GLB,,, | Performed by: OPTOMETRIST

## 2024-12-18 NOTE — PROGRESS NOTES
HPI    SHERRI: 0/14/2023  Chief complaint (CC): Routine eye exam with C/o dry eye's OS feels like   something is in eye   Glasses? +  Contacts? -  H/o eye surgery, injections or laser: -  H/o eye injury: -  Known eye conditions? See above  Family h/o eye conditions? -  Eye gtts? Visine OU PRN       (-) Flashes (-)  Floaters (-) Mucous   (-)  Tearing (-) Itching (-) Burning   (-) Headaches (-) Eye Pain/discomfort (+) Irritation OS DRY   (-)  Redness (-) Double vision (-) Blurry vision    Diabetic? +  A1c? Hemoglobin A1C       Date                     Value               Ref Range             Status                12/17/2024               7.6 (H)             4.0 - 5.6 %           Final              Comment:    ADA Screening Guidelines:  5.7-6.4%  Consistent with   prediabetes  >or=6.5%  Consistent with diabetes    High levels of fetal   hemoglobin interfere with the HbA1C  assay. Heterozygous hemoglobin   variants (HbS, HgC, etc)do  not significantly interfere with this assay.     However, presence of multiple variants may affect accuracy.         06/06/2024               8.2 (H)             4.0 - 5.6 %           Final              Comment:    ADA Screening Guidelines:  5.7-6.4%  Consistent with   prediabetes  >or=6.5%  Consistent with diabetes    High levels of fetal   hemoglobin interfere with the HbA1C  assay. Heterozygous hemoglobin   variants (HbS, HgC, etc)do  not significantly interfere with this assay.     However, presence of multiple variants may affect accuracy.         12/15/2023               8.3 (H)             4.0 - 5.6 %           Final              Comment:    ADA Screening Guidelines:  5.7-6.4%  Consistent with   prediabetes  >or=6.5%  Consistent with diabetes    High levels of fetal   hemoglobin interfere with the HbA1C  assay. Heterozygous hemoglobin   variants (HbS, HgC, etc)do  not significantly interfere with this assay.     However, presence of multiple variants may affect accuracy.          02/13/2020               8.6 (H)             4.8 - 5.6 %           Final              Comment:    [facility]  ----------    Last edited by Ronal Davis MA on 12/18/2024  2:35 PM.            Assessment /Plan     For exam results, see Encounter Report.      Type 2 diabetes mellitus without retinopathy  BS control. No signs of diabetic retinopathy. Monitor with annual exam.     Dry eye syndrome of both eyes  Recommend iVizia, Systane Ultra or Refresh Optive BID-TID OU to aid with symptoms of dry eyes.    Hyperopia with astigmatism and presbyopia, bilateral  SRx released to patient. Patient educated on lens options. Normal ocular health. RTC 1 year for routine exam.

## 2024-12-19 ENCOUNTER — OFFICE VISIT (OUTPATIENT)
Dept: CARDIOLOGY | Facility: CLINIC | Age: 54
End: 2024-12-19
Payer: COMMERCIAL

## 2024-12-19 VITALS
HEIGHT: 70 IN | SYSTOLIC BLOOD PRESSURE: 119 MMHG | DIASTOLIC BLOOD PRESSURE: 83 MMHG | OXYGEN SATURATION: 98 % | HEART RATE: 88 BPM | WEIGHT: 193.81 LBS | RESPIRATION RATE: 18 BRPM | BODY MASS INDEX: 27.75 KG/M2

## 2024-12-19 DIAGNOSIS — I25.10 CORONARY ARTERY DISEASE, UNSPECIFIED VESSEL OR LESION TYPE, UNSPECIFIED WHETHER ANGINA PRESENT, UNSPECIFIED WHETHER NATIVE OR TRANSPLANTED HEART: ICD-10-CM

## 2024-12-19 DIAGNOSIS — I50.9 CONGESTIVE HEART FAILURE, UNSPECIFIED HF CHRONICITY, UNSPECIFIED HEART FAILURE TYPE: Primary | ICD-10-CM

## 2024-12-19 PROCEDURE — 99999 PR PBB SHADOW E&M-EST. PATIENT-LVL IV: CPT | Mod: PBBFAC,,, | Performed by: INTERNAL MEDICINE

## 2024-12-19 PROCEDURE — 99214 OFFICE O/P EST MOD 30 MIN: CPT | Mod: S$GLB,,, | Performed by: INTERNAL MEDICINE

## 2024-12-19 PROCEDURE — G2211 COMPLEX E/M VISIT ADD ON: HCPCS | Mod: S$GLB,,, | Performed by: INTERNAL MEDICINE

## 2024-12-19 RX ORDER — ATORVASTATIN CALCIUM 40 MG/1
40 TABLET, FILM COATED ORAL NIGHTLY
Qty: 90 TABLET | Refills: 3 | Status: SHIPPED | OUTPATIENT
Start: 2024-12-19

## 2024-12-19 RX ORDER — LOSARTAN POTASSIUM 25 MG/1
25 TABLET ORAL DAILY
Qty: 90 TABLET | Refills: 3 | Status: SHIPPED | OUTPATIENT
Start: 2024-12-19

## 2024-12-19 NOTE — PROGRESS NOTES
CARDIOVASCULAR CONSULTATION    REASON FOR CONSULT:   Jaswinder Valle is a 54 y.o. male who presents for   Chief Complaint   Patient presents with    Follow-up        Referred by:  No referring provider defined for this encounter.      HISTORY OF PRESENT ILLNESS:     Patient is a pleasant 49-year-old man who was recently diagnosed with diabetes.  States that he has a very active person and denies any chest pains at rest on exertion, orthopnea, PND.  States that he has to climb multiple flights of stair at has never had any issues with that.  Wants to optimize his cardiovascular risk     Notes from October 2019:  Patient here for follow-up today.  States that he does not want to take Lipitor and never even started..  He wants to changes diet and exercise and lifestyle patterns and see how that affects his diabetes and his lipid profile.  We spent a considerable amount of time talking about the data behind diet and exercise as well as Lipitor and diabetes.  Patient states that he understands and would try lifestyle modifications and agreed for rechecking lipid profile in 6 months.        May 22:  Patient here for follow-up after a long hiatus.  Denies any chest pains at rest on exertion, orthopnea, PND.  Concerned about the condition of his heart because uncontrolled diabetes as well as dyslipidemia.  Still refusing to take statins.        Notes from May 2022:  Patient here for follow-up.  Asymptomatic.  Stress test abnormal.  EF 45%.  Stress EKG abnormal.     The ECG portion of this study is positive for myocardial ischemia.  The stress echo portion of this study is negative for myocardial ischemia.  Mildly decreased systolic function.  The estimated ejection fraction is 45%.  The patient's exercise capacity was normal.  The test was stopped because the patient experienced fatigue.  There were no arrhythmias during stress.        July 22: doing fine. No cp, orthopnea, pnd       The pre-procedure left  ventricular end diastolic pressure was 4.  The estimated blood loss was none.  There was non-obstructive coronary artery disease..     LEFT MAIN: NO SIGNIFICANT STENOSIS     LAD: PROX 10-20% STENOSIS     LCX: 10% STENOSIS     RCA: PROX 30%  STENOSIS     ACCESS: We accessed the right radial artery using ultrasound guidance. The vessel appeared widely patent, suitable for endovascular access. The images were interpreted by me and saved.  Access was closed with radial band.         September 2022: Patient here for follow-up.  Doing fine.  Denies any chest pains at rest on exertion, orthopnea, PND.  EF has normalized        The left ventricle is normal in size with mild concentric hypertrophy and normal systolic function.  The estimated ejection fraction is 55%.  Normal left ventricular diastolic function.  Normal right ventricular size with normal right ventricular systolic function.  Normal central venous pressure (3 mmHg).  The estimated PA systolic pressure is 17 mmHg.  There is no pulmonary hypertension.      December 24    History of Present Illness    CHIEF COMPLAINT:  - Jaswinder presents for follow-up to discuss medication management and overall cardiac health.    HPI:  Jaswinder reports non-adherence to prescribed medications for 1 month. He discontinued atorvastatin for 1.5 months due to running out of the medication, noting elevated cholesterol levels during this period. Losartan was also discontinued when the supply was exhausted. His last echo in 08/2022 showed improvement in heart function after starting medications, with EF normalizing from a previous low of 45%.    He denies chest pain, tightness, dyspnea, palpitations, or any side effects from medications.    MEDICATIONS:  - ASA  - Atorvastatin  - Losartan  -    LABS:  - Cholesterol: Elevated after patient discontinued medication for 1.5 months       Dec 24:  History of Present Illness    CHIEF COMPLAINT:  - Jaswinder presents for follow-up to discuss  medication management and overall cardiac health.    HPI:  Jaswinder reports non-adherence to prescribed medications for 1 month. He discontinued atorvastatin for 1.5 months due to running out of the medication, noting elevated cholesterol levels during this period. Losartan was also discontinued when the supply was exhausted. His last echo in 08/2022 showed improvement in heart function after starting medications, with EF normalizing from a previous low of 45%.    He denies chest pain, tightness, dyspnea, palpitations, or any side effects from medications.    MEDICATIONS:  - ASA  - Atorvastatin  - Losartan  - ASA  - Atorvastatin for cholesterol  - Losartan    LABS:  - Cholesterol: Elevated after patient discontinued medication for 1.5 months         Results for orders placed or performed in visit on 05/12/22   IN OFFICE EKG 12-LEAD (to [employer])    Collection Time: 05/12/22 11:39 AM    Narrative    Test Reason : E78.5,    Vent. Rate : 079 BPM     Atrial Rate : 079 BPM     P-R Int : 168 ms          QRS Dur : 086 ms      QT Int : 374 ms       P-R-T Axes : 074 046 018 degrees     QTc Int : 428 ms    Normal sinus rhythm  Normal ECG  When compared with ECG of 16-SEP-2019 15:53,  No significant change was found  Confirmed by Bro Souza MD ([id number]) on 5/12/2022 4:45:37 PM    Referred By:  BELLA           Confirmed By:Bro Souza MD          ECHO    Results for orders placed during the hospital encounter of 09/01/22    Echo    Interpretation Summary  · The left ventricle is normal in size with mild concentric hypertrophy and normal systolic function.  · The estimated ejection fraction is 55%.  · Normal left ventricular diastolic function.  · Normal right ventricular size with normal right ventricular systolic function.  · Normal central venous pressure (3 mmHg).  · The estimated PA systolic pressure is 17 mmHg.  · There is no pulmonary hypertension.      STRESS TEST    No results found for this or any previous  visit.        CATH    Results for orders placed during the hospital encounter of 07/06/22    Cardiac catheterization    Conclusion  · The pre-procedure left ventricular end diastolic pressure was 4.  · The estimated blood loss was none.  · There was non-obstructive coronary artery disease..    LEFT MAIN: NO SIGNIFICANT STENOSIS    LAD: PROX 10-20% STENOSIS    LCX: 10% STENOSIS    RCA: PROX 30%  STENOSIS    ACCESS: We accessed the right radial artery using ultrasound guidance. The vessel appeared widely patent, suitable for endovascular access. The images were interpreted by me and saved.  Access was closed with radial band.              The procedure log was documented by Documenter: Rubina King RN and verified by Km Patel MD.    Date: 7/6/2022  Time: 8:06 AM      PAST MEDICAL HISTORY:     Past Medical History:   Diagnosis Date    Diabetes mellitus type 2 without retinopathy 6/30/2016    Diabetes mellitus, type 2     Diabetes type 2, uncontrolled 6/29/2016    Nuclear sclerosis of both eyes 6/30/2016       PAST SURGICAL HISTORY:     Past Surgical History:   Procedure Laterality Date    COLONOSCOPY N/A 5/16/2018    Procedure: COLONOSCOPY;  Surgeon: Jovany Mendiola MD;  Location: [facility] ENDO;  Service: Endoscopy;  Laterality: N/A;    LEFT HEART CATHETERIZATION Left 7/6/2022    Procedure: Left heart cath;  Surgeon: Km Patel MD;  Location: [facility] CATH LAB;  Service: Cardiology;  Laterality: Left;  RN PREOP ON 6/29/22.--NEED H/P           SOCIAL HISTORY:     Social History     Socioeconomic History    Marital status: Single   Tobacco Use    Smoking status: Never    Smokeless tobacco: Never   Substance and Sexual Activity    Alcohol use: Yes    Drug use: No    Sexual activity: Yes     Partners: Female     Social Drivers of Health     Financial Resource Strain: Low Risk  (12/19/2023)    Overall Financial Resource Strain (CARDIA)     Difficulty of Paying Living Expenses: Not hard at all   Food Insecurity: No Food  Insecurity (12/19/2023)    Hunger Vital Sign     Worried About Running Out of Food in the Last Year: Never true     Ran Out of Food in the Last Year: Never true   Transportation Needs: No Transportation Needs (12/19/2023)    PRAPARE - Transportation     Lack of Transportation (Medical): No     Lack of Transportation (Non-Medical): No   Physical Activity: Insufficiently Active (12/19/2023)    Exercise Vital Sign     Days of Exercise per Week: 2 days     Minutes of Exercise per Session: 30 min   Stress: No Stress Concern Present (12/19/2023)    [employer] - Occupational Stress Questionnaire     Feeling of Stress : Not at all   Housing Stability: Low Risk  (12/19/2023)    Housing Stability Vital Sign     Unable to Pay for Housing in the Last Year: No     Number of Places Lived in the Last Year: 1     Unstable Housing in the Last Year: No       FAMILY HISTORY:     Family History   Problem Relation Name Age of Onset    Cataracts Mother      Diabetes Mother      No Known Problems Father      No Known Problems Sister      No Known Problems Brother      No Known Problems Maternal Aunt      No Known Problems Maternal Uncle      No Known Problems Paternal Aunt      No Known Problems Paternal Uncle      No Known Problems Maternal Grandmother      No Known Problems Maternal Grandfather      No Known Problems Paternal Grandmother      No Known Problems Paternal Grandfather      Amblyopia Neg Hx      Blindness Neg Hx      Cancer Neg Hx      Hypertension Neg Hx      Macular degeneration Neg Hx      Retinal detachment Neg Hx      Strabismus Neg Hx      Stroke Neg Hx      Thyroid disease Neg Hx      Glaucoma Neg Hx         REVIEW OF SYSTEMS:   Review of Systems   Constitutional: Negative.   HENT: Negative.     Eyes: Negative.    Cardiovascular: Negative.    Respiratory: Negative.     Endocrine: Negative.    Hematologic/Lymphatic: Negative.    Skin: Negative.    Musculoskeletal: Negative.   "  Gastrointestinal: Negative.    Genitourinary: Negative.    Neurological: Negative.    Psychiatric/Behavioral: Negative.     Allergic/Immunologic: Negative.        A 10 point review of systems was performed and all the pertinent positives have been mentioned. Rest of review of systems was negative.        PHYSICAL EXAM:     Vitals:    12/19/24 1342   BP: 119/83   Pulse: 88   Resp: 18    Body mass index is 27.81 kg/m².  Weight: 87.9 kg (193 lb 12.6 oz)   Height: 5' 10" (177.8 cm)     Physical Exam  Vitals reviewed.   Constitutional:       Appearance: He is well-developed.   HENT:      Head: Normocephalic.   Eyes:      Conjunctiva/sclera: Conjunctivae normal.      Pupils: Pupils are equal, round, and reactive to light.   Cardiovascular:      Rate and Rhythm: Normal rate and regular rhythm.      Heart sounds: Normal heart sounds.   Pulmonary:      Effort: Pulmonary effort is normal.      Breath sounds: Normal breath sounds.   Abdominal:      General: Bowel sounds are normal.      Palpations: Abdomen is soft.   Musculoskeletal:      Cervical back: Normal range of motion and neck supple.   Skin:     General: Skin is warm.   Neurological:      Mental Status: He is alert and oriented to person, place, and time.         Physical Exam              DATA:     Laboratory:  CBC:  Recent Labs   Lab 12/15/23  0747 06/06/24  0831 12/17/24  0925   WBC 6.20 5.78 4.30   Hemoglobin 14.9 14.1 14.8   Hematocrit 43.8 41.8 42.8   Platelets 171 163 166       CHEMISTRIES:  Recent Labs   Lab 06/24/22  1007 06/06/24  0831 12/17/24  0925   Glucose 272 H 208 H 167 H   Sodium 137 140 137   Potassium 4.4 4.8 4.6   BUN 17 23 H 19   Creatinine 1.4 1.18 1.20   eGFR if African American >60.0  --   --    eGFR if non  56.6 A  --   --    Calcium 9.5 8.8 9.1       CARDIAC BIOMARKERS:        COAGS:        LIPIDS/LFTS:  Recent Labs   Lab 06/24/22  1007 12/15/23  0747 06/06/24  0831 12/17/24  0925   Cholesterol 131 119 L 119 L 202 H "   Triglycerides 89 74 81 115   HDL 50 46 47 50   LDL Cholesterol 63.2 58.2 L 55.8 L 129.0   Non-HDL Cholesterol 81 73 72 152   AST 33  --  29 36   ALT 58 H  --  56 H 53 H       Hemoglobin A1C   Date Value Ref Range Status   12/17/2024 7.6 (H) 4.0 - 5.6 % Final     Comment:     ADA Screening Guidelines:  5.7-6.4%  Consistent with prediabetes  >or=6.5%  Consistent with diabetes    High levels of fetal hemoglobin interfere with the HbA1C  assay. Heterozygous hemoglobin variants (HbS, HgC, etc)do  not significantly interfere with this assay.   However, presence of multiple variants may affect accuracy.     06/06/2024 8.2 (H) 4.0 - 5.6 % Final     Comment:     ADA Screening Guidelines:  5.7-6.4%  Consistent with prediabetes  >or=6.5%  Consistent with diabetes    High levels of fetal hemoglobin interfere with the HbA1C  assay. Heterozygous hemoglobin variants (HbS, HgC, etc)do  not significantly interfere with this assay.   However, presence of multiple variants may affect accuracy.     12/15/2023 8.3 (H) 4.0 - 5.6 % Final     Comment:     ADA Screening Guidelines:  5.7-6.4%  Consistent with prediabetes  >or=6.5%  Consistent with diabetes    High levels of fetal hemoglobin interfere with the HbA1C  assay. Heterozygous hemoglobin variants (HbS, HgC, etc)do  not significantly interfere with this assay.   However, presence of multiple variants may affect accuracy.     02/13/2020 8.6 (H) 4.8 - 5.6 % Final     Comment:     [facility]       TSH  Recent Labs   Lab 12/15/23  0747 06/06/24  0831 12/17/24  0925   TSH 1.050 1.870 1.350       The 10-year ASCVD risk score (Carlos RODRIGUEZ, et al., 2019) is: 11.4%    Values used to calculate the score:      Age: 54 years      Sex: Male      Is Non- : Yes      Diabetic: Yes      Tobacco smoker: No      Systolic Blood Pressure: 119 mmHg      Is BP treated: No      HDL Cholesterol: 50 mg/dL      Total Cholesterol: 202 mg/dL       BNP    No results found for:  ""BNP"      ASSESSMENT AND PLAN     Patient Active Problem List   Diagnosis    Diabetes mellitus type 2 without retinopathy    Nuclear sclerosis, bilateral    Refractive error    Type 2 diabetes mellitus with hyperglycemia, without long-term current use of insulin    Erectile dysfunction    BPH with urinary obstruction    Adhesive capsulitis of both shoulders           ALLERGIES AND MEDICATION:     Review of patient's allergies indicates:   Allergen Reactions    No known drug allergies         Medication List            Accurate as of December 19, 2024  2:54 PM. If you have any questions, ask your nurse or doctor.                CHANGE how you take these medications      losartan 25 MG tablet  Commonly known as: COZAAR  Take 1 tablet (25 mg total) by mouth once daily.  What changed: when to take this  Changed by: Km Patel MD            CONTINUE taking these medications      aspirin 81 MG Chew  CHEW AND SWALLOW 1 TABLET BY MOUTH ONCE DAILY     atorvastatin 40 MG tablet  Commonly known as: LIPITOR  Take 1 tablet (40 mg total) by mouth every evening.     glipiZIDE 5 MG Tr24  Take 1 tablet (5 mg total) by mouth daily with breakfast.     meloxicam 7.5 MG tablet  Commonly known as: MOBIC     OZEMPIC 1 mg/dose (4 mg/3 mL)  Generic drug: semaglutide  INJECT 1MG SUBCUTANEOUSLY  EVERY 7 DAYS               Where to Get Your Medications        These medications were sent to [facility], [place] - [address] [facility]  [address] [facility] [address]      Phone: [phone]   atorvastatin 40 MG tablet  losartan 25 MG tablet         Orders Placed This Encounter   Procedures    Echo       Assessment & Plan        1.  Cardiovascular risk optimization.  On Lipitor     2. Cardiomyopathy with EF of 45% and abnormal stress EKG.  Stress echo portion was normal.  Because of EF of 45% coronary angiogram was performed.  Subsequent angiogram did not reveal any significant coronary artery stenosis.  EF has normalized.  " On losartan    3. Dyslipidemia: On Lipitor.      Thank you very much for involving me in the care of your patient.  Please do not hesitate to contact me if there are any questions.      Km Patel MD, FAC, [facility]  Interventional Cardiologist, [facility].           This note was dictated with the help of speech recognition software.  There might be un-intended errors and/or substitutions.    This note was generated with the assistance of ambient listening technology. Verbal consent was obtained by the patient and accompanying visitor(s) for the recording of patient appointment to facilitate this note. I attest to having reviewed and edited the generated note for accuracy, though some syntax or spelling errors may persist. Please contact the author of this note for any clarification.

## 2024-12-23 ENCOUNTER — PATIENT MESSAGE (OUTPATIENT)
Dept: ADMINISTRATIVE | Facility: HOSPITAL | Age: 54
End: 2024-12-23
Payer: COMMERCIAL

## 2024-12-23 ENCOUNTER — TELEPHONE (OUTPATIENT)
Dept: FAMILY MEDICINE | Facility: CLINIC | Age: 54
End: 2024-12-23
Payer: COMMERCIAL

## 2024-12-24 NOTE — TELEPHONE ENCOUNTER
Patient stated that he have no concerns with his results. He will follow up with Dr. Braden next year.

## 2024-12-30 ENCOUNTER — LAB VISIT (OUTPATIENT)
Dept: LAB | Facility: HOSPITAL | Age: 54
End: 2024-12-30
Attending: FAMILY MEDICINE
Payer: COMMERCIAL

## 2024-12-30 DIAGNOSIS — E11.9 TYPE 2 DIABETES MELLITUS WITHOUT COMPLICATION: ICD-10-CM

## 2024-12-30 LAB
ALBUMIN/CREAT UR: 5.4 UG/MG (ref 0–30)
CREAT UR-MCNC: 406 MG/DL (ref 23–375)
MICROALBUMIN UR DL<=1MG/L-MCNC: 22 UG/ML

## 2024-12-30 PROCEDURE — 82043 UR ALBUMIN QUANTITATIVE: CPT | Mod: PN | Performed by: FAMILY MEDICINE

## 2025-01-24 DIAGNOSIS — E11.65 TYPE 2 DIABETES MELLITUS WITH HYPERGLYCEMIA, WITHOUT LONG-TERM CURRENT USE OF INSULIN: ICD-10-CM

## 2025-01-24 RX ORDER — SEMAGLUTIDE 1.34 MG/ML
INJECTION, SOLUTION SUBCUTANEOUS
Qty: 9 ML | Refills: 1 | Status: SHIPPED | OUTPATIENT
Start: 2025-01-24

## 2025-01-24 NOTE — TELEPHONE ENCOUNTER
No care due was identified.  [facility] Embedded Care Due Messages. Reference number: [id number].   1/24/2025 1:12:55 AM CST

## 2025-01-24 NOTE — TELEPHONE ENCOUNTER
Refill Decision Note   Jaswinder Valle  is requesting a refill authorization.  Brief Assessment and Rationale for Refill:  Approve     Medication Therapy Plan:         Comments:     Note composed:7:32 AM 01/24/2025

## 2025-02-13 ENCOUNTER — OFFICE VISIT (OUTPATIENT)
Dept: FAMILY MEDICINE | Facility: CLINIC | Age: 55
End: 2025-02-13
Payer: COMMERCIAL

## 2025-02-13 ENCOUNTER — LAB VISIT (OUTPATIENT)
Dept: LAB | Facility: HOSPITAL | Age: 55
End: 2025-02-13
Payer: COMMERCIAL

## 2025-02-13 VITALS
OXYGEN SATURATION: 95 % | BODY MASS INDEX: 28.18 KG/M2 | TEMPERATURE: 98 F | DIASTOLIC BLOOD PRESSURE: 60 MMHG | HEIGHT: 70 IN | HEART RATE: 81 BPM | SYSTOLIC BLOOD PRESSURE: 124 MMHG | WEIGHT: 196.88 LBS

## 2025-02-13 DIAGNOSIS — Z00.00 ROUTINE HEALTH MAINTENANCE: ICD-10-CM

## 2025-02-13 DIAGNOSIS — G89.29 CHRONIC NONINTRACTABLE HEADACHE, UNSPECIFIED HEADACHE TYPE: ICD-10-CM

## 2025-02-13 DIAGNOSIS — F32.A DEPRESSION, UNSPECIFIED DEPRESSION TYPE: Primary | ICD-10-CM

## 2025-02-13 DIAGNOSIS — R51.9 CHRONIC NONINTRACTABLE HEADACHE, UNSPECIFIED HEADACHE TYPE: ICD-10-CM

## 2025-02-13 DIAGNOSIS — H93.13 TINNITUS OF BOTH EARS: ICD-10-CM

## 2025-02-13 LAB
ALBUMIN SERPL BCP-MCNC: 4 G/DL (ref 3.5–5.2)
ALP SERPL-CCNC: 99 U/L (ref 40–150)
ALT SERPL W/O P-5'-P-CCNC: 54 U/L (ref 10–44)
ANION GAP SERPL CALC-SCNC: 7 MMOL/L (ref 8–16)
AST SERPL-CCNC: 30 U/L (ref 10–40)
BILIRUB SERPL-MCNC: 1.5 MG/DL (ref 0.1–1)
BUN SERPL-MCNC: 17 MG/DL (ref 6–20)
CALCIUM SERPL-MCNC: 9.4 MG/DL (ref 8.7–10.5)
CHLORIDE SERPL-SCNC: 102 MMOL/L (ref 95–110)
CO2 SERPL-SCNC: 28 MMOL/L (ref 23–29)
CREAT SERPL-MCNC: 1.1 MG/DL (ref 0.5–1.4)
EST. GFR  (NO RACE VARIABLE): >60 ML/MIN/1.73 M^2
GLUCOSE SERPL-MCNC: 239 MG/DL (ref 70–110)
HIV 1+2 AB+HIV1 P24 AG SERPL QL IA: NORMAL
POTASSIUM SERPL-SCNC: 4.2 MMOL/L (ref 3.5–5.1)
PROT SERPL-MCNC: 7.1 G/DL (ref 6–8.4)
SODIUM SERPL-SCNC: 137 MMOL/L (ref 136–145)

## 2025-02-13 PROCEDURE — 87389 HIV-1 AG W/HIV-1&-2 AB AG IA: CPT

## 2025-02-13 PROCEDURE — 80053 COMPREHEN METABOLIC PANEL: CPT

## 2025-02-13 PROCEDURE — 36415 COLL VENOUS BLD VENIPUNCTURE: CPT | Mod: PO

## 2025-02-13 PROCEDURE — 99999 PR PBB SHADOW E&M-EST. PATIENT-LVL V: CPT | Mod: PBBFAC,,,

## 2025-02-13 RX ORDER — VENLAFAXINE HYDROCHLORIDE 75 MG/1
75 CAPSULE, EXTENDED RELEASE ORAL DAILY
Qty: 30 CAPSULE | Refills: 2 | Status: SHIPPED | OUTPATIENT
Start: 2025-02-20 | End: 2025-05-21

## 2025-02-13 RX ORDER — VENLAFAXINE HYDROCHLORIDE 37.5 MG/1
37.5 CAPSULE, EXTENDED RELEASE ORAL DAILY
Qty: 7 CAPSULE | Refills: 0 | Status: SHIPPED | OUTPATIENT
Start: 2025-02-13 | End: 2025-02-20

## 2025-02-13 NOTE — PROGRESS NOTES
HPI     Jaswinder Valle is a 54 y.o. male with multiple medical diagnoses as listed in the medical history and problem list that presents for depression. PCP Dr. Braden with last visit in this clinic on 6/17/24.     Chief Complaint   Patient presents with    Depression       HPI    CHIEF COMPLAINT:  Patient presents today for depression symptoms.     DEPRESSION:  He reports experiencing depression for approximately 1.5 years. He endorses irritability noted by spouse, loss of interest in previously enjoyed activities, feeling bad about self most days, and trouble concentrating, specifically with reading. He describes social withdrawal at work, including distancing from coworkers. He denies suicidal ideation or thoughts of self-harm. He reports difficulty falling or staying asleep, as well as hypersomnia. He experiences fatigue and little energy.    HEADACHES:  He experiences frequent headaches multiple times weekly without specific pattern or time of day. The headaches are described as severe and sometimes interfere with sleep. Over-the-counter ibuprofen provides variable relief, occasionally requiring increased dosage. Resting in a dark room sometimes provides relief.    TINNITUS AND HEARING:  He was diagnosed with tinnitus in the  approximately 2 years ago with severe ringing in ears that necessitates isolation in a dark room at night for relief. He notes worsening hearing but has not seen an ENT specialist.     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       Assessment & Plan     1. Depression, unspecified depression type    Denies SI/HI. PHQ-9 score of 17, moderately severe depression.     - Discussed depression symptoms, prevalence, and treatment options with the patient.  - Explained potential side effects of antidepressant medications, including weight changes and sexual side effects.  - Recommend initiating  antidepressant medication Effexor-XR. Will start at 37.5mg dose daily x 1 week, then increase to 75mg daily.   -Discussed starting adjunctive therapy to help with depression symptoms  -Discussed importance of following up with me or his PCP in about 6 weeks to reassess effectiveness of medication. Patient will check his work schedule at home and either schedule on his patient portal or by calling office.     - Ambulatory referral/consult to Behavioral Health; Future  - venlafaxine (EFFEXOR-XR) 37.5 MG 24 hr capsule; Take 1 capsule (37.5 mg total) by mouth once daily. for 7 days  Dispense: 7 capsule; Refill: 0  - venlafaxine (EFFEXOR-XR) 75 MG 24 hr capsule; Take 1 capsule (75 mg total) by mouth once daily.  Dispense: 30 capsule; Refill: 2    2. Tinnitus of both ears    Ongoing for approximately 2 years. Ear exam in clinic today normal. Denies dizziness, lightheadedness. Will refer to ENT for further assessment and treatment.     - Ambulatory referral/consult to ENT; Future    3. Chronic nonintractable headache, unspecified headache type    Ongoing for several years, no recent changes in frequency or intensity. No specific trigger, but does notice sensitivity to light and noise with headaches. Uses PRN ibuprofen/tylenol with some relief. Discussed that Effexor for depression has the added effect of helping with chronic headaches. Will trial Effexor and continue with PRN OTC pain medication. Follow up with clinic for any worsening symptoms, or if symptoms fail to improve.     ED precautions given.     4. Routine health maintenance    - COMPREHENSIVE METABOLIC PANEL; Future  - HIV 1/2 Ag/Ab (4th Gen); Future          Discussed DDx, condition, and treatment.   Education sent to patient portal/included in after visit summary.  ED precautions given.   Notify provider if symptoms do not resolve or increase in severity.   Patient verbalizes understanding and agrees with plan of  care.    --------------------------------------------      Health Maintenance:  Health Maintenance         Date Due Completion Date    Foot Exam 07/17/2024 7/17/2023 (Done)    Override on 7/17/2023: Done    Influenza Vaccine (1) 09/01/2024 12/20/2023    COVID-19 Vaccine (4 - 2024-25 season) 09/01/2024 12/24/2021    Hemoglobin A1c 06/17/2025 12/17/2024    Lipid Panel 12/17/2025 12/17/2024    Diabetic Eye Exam 12/18/2025 12/18/2024    Diabetes Urine Screening 12/30/2025 12/30/2024    Colorectal Cancer Screening 05/16/2028 5/16/2018    TETANUS VACCINE 09/05/2029 9/5/2019    RSV Vaccine (Age 60+ and Pregnant patients) (1 - 1-dose 75+ series) 02/23/2043 ---            Discussed the importance of overdue vaccines which were offered during this encounter. Patient declined overdue vaccines at this time and Advised patient on the importance of completing overdue health maintenance items    Follow Up:  Follow up in about 6 weeks (around 3/27/2025).    Exam     Review of Systems:  (as noted above)  Review of Systems   Constitutional:  Negative for fever.   HENT:  Positive for hearing loss. Negative for trouble swallowing.    Respiratory:  Negative for shortness of breath.    Cardiovascular:  Negative for chest pain.   Gastrointestinal:  Negative for blood in stool.   Neurological:  Positive for headaches. Negative for dizziness, facial asymmetry, weakness and light-headedness.   Psychiatric/Behavioral:  Positive for dysphoric mood and sleep disturbance. Negative for suicidal ideas.        Physical Exam:   Physical Exam  Constitutional:       General: He is not in acute distress.     Appearance: Normal appearance. He is not ill-appearing.   HENT:      Head: Normocephalic and atraumatic.      Right Ear: Tympanic membrane and external ear normal.      Left Ear: Tympanic membrane and external ear normal.      Nose: Nose normal.   Cardiovascular:      Rate and Rhythm: Normal rate and regular rhythm.      Pulses: Normal pulses.       "Heart sounds: Normal heart sounds. No murmur heard.  Pulmonary:      Effort: Pulmonary effort is normal. No respiratory distress.      Breath sounds: Normal breath sounds. No wheezing.   Musculoskeletal:         General: Normal range of motion.      Cervical back: Normal range of motion. No rigidity.   Lymphadenopathy:      Cervical: No cervical adenopathy.   Skin:     General: Skin is warm and dry.      Capillary Refill: Capillary refill takes less than 2 seconds.   Neurological:      General: No focal deficit present.      Mental Status: He is alert and oriented to person, place, and time. Mental status is at baseline.      GCS: GCS eye subscore is 4. GCS verbal subscore is 5. GCS motor subscore is 6.      Motor: No weakness.      Coordination: Coordination is intact.      Gait: Gait normal.   Psychiatric:         Mood and Affect: Mood normal.         Behavior: Behavior normal.       Vitals:    02/13/25 1307   BP: 124/60   Pulse: 81   Temp: 98 °F (36.7 °C)   TempSrc: Oral   SpO2: 95%   Weight: 89.3 kg (196 lb 13.9 oz)   Height: 5' 10" (1.778 m)      Body mass index is 28.25 kg/m².        History     Past Medical History:  Past Medical History:   Diagnosis Date    Diabetes mellitus type 2 without retinopathy 6/30/2016    Diabetes mellitus, type 2     Diabetes type 2, uncontrolled 6/29/2016    Nuclear sclerosis of both eyes 6/30/2016       Past Surgical History:  Past Surgical History:   Procedure Laterality Date    COLONOSCOPY N/A 5/16/2018    Procedure: COLONOSCOPY;  Surgeon: Jovany Mendiola MD;  Location: [facility] ENDO;  Service: Endoscopy;  Laterality: N/A;    LEFT HEART CATHETERIZATION Left 7/6/2022    Procedure: Left heart cath;  Surgeon: Km Patel MD;  Location: [facility] CATH LAB;  Service: Cardiology;  Laterality: Left;  RN PREOP ON 6/29/22.--NEED H/P       Social History:  Social History     Socioeconomic History    Marital status: Single   Tobacco Use    Smoking status: Never    Smokeless tobacco: Never "   Substance and Sexual Activity    Alcohol use: Yes    Drug use: No    Sexual activity: Yes     Partners: Female     Social Drivers of Health     Financial Resource Strain: Low Risk  (12/19/2023)    Overall Financial Resource Strain (CARDIA)     Difficulty of Paying Living Expenses: Not hard at all   Food Insecurity: No Food Insecurity (2/10/2025)    Hunger Vital Sign     Worried About Running Out of Food in the Last Year: Never true     Ran Out of Food in the Last Year: Never true   Transportation Needs: No Transportation Needs (12/19/2023)    PRAPARE - Transportation     Lack of Transportation (Medical): No     Lack of Transportation (Non-Medical): No   Physical Activity: Inactive (2/10/2025)    Exercise Vital Sign     Days of Exercise per Week: 0 days     Minutes of Exercise per Session: 0 min   Stress: Stress Concern Present (2/10/2025)    [employer] - Occupational Stress Questionnaire     Feeling of Stress : Very much   Housing Stability: High Risk (2/10/2025)    Housing Stability Vital Sign     Unable to Pay for Housing in the Last Year: Yes       Family History:  Family History   Problem Relation Name Age of Onset    Cataracts Mother      Diabetes Mother      No Known Problems Father      No Known Problems Sister      No Known Problems Brother      No Known Problems Maternal Aunt      No Known Problems Maternal Uncle      No Known Problems Paternal Aunt      No Known Problems Paternal Uncle      No Known Problems Maternal Grandmother      No Known Problems Maternal Grandfather      No Known Problems Paternal Grandmother      No Known Problems Paternal Grandfather      Amblyopia Neg Hx      Blindness Neg Hx      Cancer Neg Hx      Hypertension Neg Hx      Macular degeneration Neg Hx      Retinal detachment Neg Hx      Strabismus Neg Hx      Stroke Neg Hx      Thyroid disease Neg Hx      Glaucoma Neg Hx         Allergies and Medications: (updated and reviewed)  Review of patient's  allergies indicates:   Allergen Reactions    No known drug allergies      Current Outpatient Medications   Medication Sig Dispense Refill    aspirin 81 MG Chew CHEW AND SWALLOW 1 TABLET BY MOUTH ONCE DAILY 30 tablet 0    atorvastatin (LIPITOR) 40 MG tablet Take 1 tablet (40 mg total) by mouth every evening. 90 tablet 3    glipiZIDE 5 MG TR24 Take 1 tablet (5 mg total) by mouth daily with breakfast. 90 tablet 3    losartan (COZAAR) 25 MG tablet Take 1 tablet (25 mg total) by mouth once daily. 90 tablet 3    OZEMPIC 1 mg/dose (4 mg/3 mL) INJECT 1MG SUBCUTANEOUSLY  EVERY 7 DAYS 9 mL 1    meloxicam (MOBIC) 7.5 MG tablet Take 7.5 mg by mouth 2 (two) times daily as needed.      venlafaxine (EFFEXOR-XR) 37.5 MG 24 hr capsule Take 1 capsule (37.5 mg total) by mouth once daily. for 7 days 7 capsule 0    [START ON 2/20/2025] venlafaxine (EFFEXOR-XR) 75 MG 24 hr capsule Take 1 capsule (75 mg total) by mouth once daily. 30 capsule 2     No current facility-administered medications for this visit.       Patient Care Team:  Mary Braden MD as PCP - General (Family Medicine)  Patito Quiñones LPN as Care Coordinator         - The patient is given an After Visit Summary that lists all medications with directions, allergies, education, orders placed during this encounter and follow-up instructions.      - I have reviewed the patient's medical information including past medical, family, and social history sections including the medications and allergies.      - We discussed the patient's current medications.     This note was created by combination of typed  and MModal dictation.  Transcription errors may be present.  If there are any questions, please contact me.   This note was generated with the assistance of ambient listening technology. Verbal consent was obtained by the patient and accompanying visitor(s) for the recording of patient appointment to facilitate this note. I attest to having reviewed and  edited the generated note for accuracy, though some syntax or spelling errors may persist. Please contact the author of this note for any clarification.                JEN Keller

## 2025-02-15 NOTE — PATIENT INSTRUCTIONS
Please call the Referral Desk to schedule ENT follow up at your earliest convenience:  [phone]. Thank you!

## 2025-02-16 ENCOUNTER — RESULTS FOLLOW-UP (OUTPATIENT)
Dept: FAMILY MEDICINE | Facility: CLINIC | Age: 55
End: 2025-02-16
Payer: COMMERCIAL

## 2025-02-16 DIAGNOSIS — Z00.00 ROUTINE HEALTH MAINTENANCE: Primary | ICD-10-CM

## 2025-02-20 ENCOUNTER — TELEPHONE (OUTPATIENT)
Dept: PSYCHIATRY | Facility: CLINIC | Age: 55
End: 2025-02-20
Payer: COMMERCIAL

## 2025-02-20 NOTE — TELEPHONE ENCOUNTER
----- Message from Yash Blanchard PA-C sent at 2/19/2025  2:12 PM CST -----  Hey can you get this NP scheduled with me next weekChaya      Left message to call office

## 2025-02-26 ENCOUNTER — OFFICE VISIT (OUTPATIENT)
Dept: PSYCHIATRY | Facility: CLINIC | Age: 55
End: 2025-02-26
Payer: COMMERCIAL

## 2025-02-26 VITALS
DIASTOLIC BLOOD PRESSURE: 83 MMHG | BODY MASS INDEX: 27.94 KG/M2 | HEART RATE: 85 BPM | WEIGHT: 195.13 LBS | HEIGHT: 70 IN | SYSTOLIC BLOOD PRESSURE: 122 MMHG

## 2025-02-26 DIAGNOSIS — F10.10 ALCOHOL ABUSE: ICD-10-CM

## 2025-02-26 DIAGNOSIS — F43.10 PTSD (POST-TRAUMATIC STRESS DISORDER): Primary | ICD-10-CM

## 2025-02-26 DIAGNOSIS — F41.1 GAD (GENERALIZED ANXIETY DISORDER): ICD-10-CM

## 2025-02-26 DIAGNOSIS — F33.2 SEVERE EPISODE OF RECURRENT MAJOR DEPRESSIVE DISORDER, WITHOUT PSYCHOTIC FEATURES: ICD-10-CM

## 2025-02-26 PROCEDURE — 99205 OFFICE O/P NEW HI 60 MIN: CPT | Mod: S$GLB,,,

## 2025-02-26 PROCEDURE — 90785 PSYTX COMPLEX INTERACTIVE: CPT | Mod: S$GLB,,,

## 2025-02-26 PROCEDURE — G2211 COMPLEX E/M VISIT ADD ON: HCPCS | Mod: S$GLB,,,

## 2025-02-26 PROCEDURE — 90833 PSYTX W PT W E/M 30 MIN: CPT | Mod: S$GLB,,,

## 2025-02-26 PROCEDURE — 99999 PR PBB SHADOW E&M-EST. PATIENT-LVL III: CPT | Mod: PBBFAC,,,

## 2025-02-26 RX ORDER — PRAZOSIN HYDROCHLORIDE 1 MG/1
1 CAPSULE ORAL NIGHTLY
Qty: 30 CAPSULE | Refills: 1 | Status: SHIPPED | OUTPATIENT
Start: 2025-02-26 | End: 2025-04-27

## 2025-02-26 RX ORDER — MIRTAZAPINE 7.5 MG/1
7.5 TABLET, FILM COATED ORAL NIGHTLY
Qty: 30 TABLET | Refills: 1 | Status: SHIPPED | OUTPATIENT
Start: 2025-02-26 | End: 2025-04-27

## 2025-02-26 RX ORDER — VENLAFAXINE HYDROCHLORIDE 37.5 MG/1
37.5 CAPSULE, EXTENDED RELEASE ORAL DAILY
Qty: 30 CAPSULE | Refills: 0 | Status: SHIPPED | OUTPATIENT
Start: 2025-02-26 | End: 2025-03-28

## 2025-02-26 NOTE — PROGRESS NOTES
"Outpatient Psychiatry Initial Visit   2/26/2025    Jaswinder Valle, a 55 y.o. male, presenting for initial evaluation visit. Met with patient.    Reason for Encounter: Referral from JEN Keller . Patient complains of mood      History of Present Illness:    SUBJECTIVE:   Psych Interview 02/26/2025:   Jaswinder Valle is a 55 y.o. male with past psychiatric history of anxiety and depression  presented to for initial evaluation and treatment for Anxiety and depression.    Pt is A+Ox 4.  Patients mood is "not good", affect appears flat, guarded, sad. Pts thought process is normal and logical.  Pts speech is normal tone, normal rate, normal pitch, normal volume  Cooperative, normal eye contact, no psychomotor retardation.  Pt is calmly seated in chair during interview.  Pt is casually dressed and well groomed.      Pertinent PMH: HIV (+), CHF, CAD, T2DM    Patient was never previously being seen by Psychiatry, was being treated by PCP.  Pt states that they are currently taking effexor 75mg daily for 2 weeks, endorses low libido and anorgasmia form SSRI.  Patient endorses acute on chronic anxiety, depression, nightmares, and flashbacks which began approximately 5 years ago after the death of his father and have recently worsened over the last two years due to symptoms of tinnitus and headaches. Patient states that him and his father were close and he has not grieved appropriately. Patient states that he began experiencing tinnitus and daily headaches for the last two years, he has appointment with ENT later this month. 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 Pt denies symptoms including nightmares, hypervigilance, flashbacks, avoidance behaviors, and disassociation.  Patient states that he did not have adequate hearing protection. Patient endorses an incident where he was sleeping in a tank and a canister fell and hit him in the head knocking him unconscious. Patient did " not report incident due to fear that he would be punished.  Patient has two daughters 40 y/o and 26 y/o. . Patient states that his biggest stressor currently is his daughter's health, 27 year old was just put on dialysis and a kidney transplant list. 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 Patient sleeps approximately 2 to three hours per night.    denies prior hx of psychiatric hospitalizations. denies hx of suicide attempts. Pt denies hx self harm. Pt endorses hx hallucinations after father .  Pt denies hx of eating disorders.   Denies physical/sexual abuse.   Reports depression today as 8/10, and anxiety as 8/10.  Reports sleeping 2-3 hrs per night, and poor appetite.   Denies SI/HI/AVH. Denies side effects of medications.  Pt states that there support consists of - sister   Access to Gun - endorses.    Denies recreational drug use. Pt reports 3 beers a day drinks per week, denies tobacco use, denies Vaping, denies Caffeine.      Current Medication:  Effexor 75mg daily     Past Medications:  none    Collateral obtained from Wife (Maritza Valle) [phone]   Wife states she is not concerned about patient safety. Wife states that she has never been concerned about patient safety. Patient endorses that there are guns at the house, wife states that they're there for self protection.  Wife states that she has noticed an increase in irritability and mood swings which began approximately 2 years ago. Wife denies any true hypomanic episodes. Wife states that currently patient is experiencing daily tinnitus and daily headaches Which are affecting mood.      DX:  The patient complained of depressed mood with lethargy, decreased appetite , insomnia, psycho-motor retardation, anhedonia, apathy, worsening self-esteem, guilt, decreased concentration and ability to make decisions.     Pt denies  hx symptoms/episodes of scarlett.    Admits to symptoms of anxiety including excessive anxiety/worry/fear, more days than not, about numerous issues, difficulty controlling the worry, over thinking, rumination, restlessness, poor concentration, fatigue, and increased irritability. Denies panic attacks at this time.     Standardized Screenings tools:   PHQ9: 20  ROBERTO- 7: 19    Review Of Systems:     Review of Systems   Constitutional:  Negative for weight loss.   HENT:  Positive for tinnitus.    Eyes:  Negative for blurred vision.   Respiratory:  Negative for cough and shortness of breath.    Cardiovascular:  Negative for chest pain.   Gastrointestinal:  Negative for abdominal pain.   Genitourinary:  Negative for dysuria.   Musculoskeletal:  Negative for back pain and neck pain.   Skin:  Negative for rash.   Neurological:  Positive for headaches. Negative for dizziness, seizures and weakness.   Psychiatric/Behavioral:  Positive for depression. Negative for hallucinations, memory loss, substance abuse and suicidal ideas. The patient is nervous/anxious and has insomnia.        Psychiatric Review Of Systems - Is patient experiencing or having changes in:  sleep: yes  appetite: yes  weight: no  energy/anergy: yes  interest/pleasure/anhedonia: yes  somatic symptoms: yes  libido: yes  anxiety/panic: yes  guilty/hopelessness: no  concentration: no  S.I.B.s/risky behavior: no  Irritability: yes  Racing thoughts: yes  Impulsive behaviors: no  Paranoia: no  AVH: no    Risk Parameters:  Patient reports no suicidal ideation  Patient reports no homicidal ideation  Patient reports no self-injurious behavior  Patient reports no violent behavior    OBJECTIVE     Past Psychiatric History:   Previous Psychiatric Hospitalizations: NO  Previous Medication Trials: YES:      History of psychotherapy:  NO  Previous Suicide Attempts: NO  History of Violence:  NO  History of physical/sexual abuse: NO  Outpatient psychiatric provider(past):  NO    Substance Abuse History:   Tobacco: NO  Alcohol: YES: 3 beers per day     Illicit Substances: NO  Detox/Rehab: NO    Neurological History:   Seizures: NO  Head trauma: YES: Football in HS and while in the  - canister fell on Pt in the head.    Family Psychiatric History: Yes  Sister - depression     Social History:  Developmental/Childhood:Achieved all developmental milestones timely  *Education:Bachelor's Degree  Employment Status/Finances:Employed   Relationship Status/Sexual Orientation: : Relationship intact  Children: 2  Housing Status: Home    history:  YES:      Access to gun: YES:      Gnosticism:Actively participates in organized Latter-day  Recreational activities:Time with family  Person patient is closest to/confides in: sister and wife    Legal History:   Past Charges/Incarcerations:  No      Past Medical/Surgical History:   Past Medical History:   Diagnosis Date    Diabetes mellitus type 2 without retinopathy 6/30/2016    Diabetes mellitus, type 2     Diabetes type 2, uncontrolled 6/29/2016    Nuclear sclerosis of both eyes 6/30/2016     Past Surgical History:   Procedure Laterality Date    COLONOSCOPY N/A 5/16/2018    Procedure: COLONOSCOPY;  Surgeon: Jovany Mendiola MD;  Location: [facility] ENDO;  Service: Endoscopy;  Laterality: N/A;    LEFT HEART CATHETERIZATION Left 7/6/2022    Procedure: Left heart cath;  Surgeon: Km Patel MD;  Location: [facility] CATH LAB;  Service: Cardiology;  Laterality: Left;  RN PREOP ON 6/29/22.--NEED H/P         Current Medications:   Medication List with Changes/Refills   New Medications    MIRTAZAPINE (REMERON) 7.5 MG TAB    Take 1 tablet (7.5 mg total) by mouth every evening.    PRAZOSIN (MINIPRESS) 1 MG CAP    Take 1 capsule (1 mg total) by mouth every evening.    VENLAFAXINE (EFFEXOR-XR) 37.5 MG 24 HR CAPSULE    Take 1 capsule (37.5 mg total) by mouth once daily.   Current Medications    ASPIRIN 81 MG CHEW    CHEW AND SWALLOW 1 TABLET BY  "MOUTH ONCE DAILY    ATORVASTATIN (LIPITOR) 40 MG TABLET    Take 1 tablet (40 mg total) by mouth every evening.    GLIPIZIDE 5 MG TR24    Take 1 tablet (5 mg total) by mouth daily with breakfast.    LOSARTAN (COZAAR) 25 MG TABLET    Take 1 tablet (25 mg total) by mouth once daily.    OZEMPIC 1 MG/DOSE (4 MG/3 ML)    INJECT 1MG SUBCUTANEOUSLY  EVERY 7 DAYS   Discontinued Medications    VENLAFAXINE (EFFEXOR-XR) 37.5 MG 24 HR CAPSULE    Take 1 capsule (37.5 mg total) by mouth once daily. for 7 days    VENLAFAXINE (EFFEXOR-XR) 75 MG 24 HR CAPSULE    Take 1 capsule (75 mg total) by mouth once daily.       Allergies:   Review of patient's allergies indicates:   Allergen Reactions    No known drug allergies          Vitals   Vitals:    02/26/25 0953   BP: 122/83   Pulse: 85        Labs/Imaging/Studies:   No results found for this or any previous visit (from the past 48 hours).   No results found for: "PHENYTOIN", "PHENOBARB", "VALPROATE", "CBMZ"      Nutritional Screening: Considering the patient's height and weight, medications, medical history and preferences, should a referral be made to the dietitian? no    Constitutional  Vitals:  Most recent vital signs, dated less than 90 days prior to this appointment, were reviewed.    Vitals:    02/26/25 0953   BP: 122/83   Pulse: 85   Weight: 88.5 kg (195 lb 1.7 oz)   Height: 5' 10" (1.778 m)        General:  unremarkable, age appropriate     Musculoskeletal  Muscle Strength/Tone:  no spasicity, no rigidity, no cogwheeling, no flaccidity, no paratonia, no dyskinesia, no dystonia, no tremor, no tic, no choreoathetosis, no atrophy   Gait & Station:  non-ataxic       Psychiatric Mental Status Exam:  Arousal: alert  Sensorium/Orientation: oriented to grossly intact, person, place, situation, time/date  Behavior/Cooperation: cooperative, eye contact normal   Speech: normal tone, normal rate, normal pitch, normal volume  Language: grossly intact, able to name, able to repeat  Mood: " "depressed  Affect: flat, sad  Thought Process: normal and logical  Thought Content: concerned with meds  Auditory hallucinations: NO  Visual hallucinations: NO  Paranoia: NO  Delusions:  NO  Suicidal ideation: NO  Homicidal ideation: NO  Attention/Concentration:  spelled "WORLD" forwards and backwards  Memory:    Recent: [facility] Recent Memory: WNL , 3 out of 3 in 3 minutes  Remote: [facility] Remote Memory: WNL , past events, as relates history  Fund of Knowledge: Aware of current events, Intact, and Vocabulary appropriate    Intelligence: [facility] Intelligence: Average, based on history, based on vocabulary, syntax, grammar and content  Insight: {[facility] insight: Fair, understanding severity of illness/history of present illness  Judgment: [facility] Judgement: Fair, per patient's behavior/history of present illness      Relevant Elements of Neurological Exam: normal gait        Assessment / Plan:     Diagnosis:  1. PTSD (post-traumatic stress disorder)  Ambulatory referral/consult to Behavioral Health      2. Severe episode of recurrent major depressive disorder, without psychotic features        3. ROBERTO (generalized anxiety disorder)        4. Alcohol abuse            Strengths and Liabilities: Strength: Patient accepts guidance/feedback, Strength: Patient is motivated for change., Strength: Patient has positive support network., Liability: Patient lacks coping skills.    Treatment Goals:  Specify outcomes written in observable, behavioral terms:   Anxiety: acquiring relapse prevention skills, reducing negative automatic thoughts, reducing physical symptoms of anxiety, and reducing time spent worrying (<30 minutes/day)  Depression: increasing interest in usual activities, increasing self-reward for positive behaviors (one/day), increasing self-reward for positive thoughts (one/day), reducing excessive guilt, and reducing fatigue    Treatment Plan/Recommendations:     Mood   Start Prazosin 1mg QHS - targeting nightmares  Start Remeron " 7.5mg QHS - targeting mood and insomnia   Decrease Effexor 37.5mg daily for 1 week then stop.    R/O Bipolar 2  Pts mood swings began 5 years ago.  Symptoms appear to be due to PTSD symptoms and high anxiety.      Pt signed RAMIN for Wife (Maritza Valle) [phone] on 2/26/2025. Scanned into chart     Encouraged Pt to cut back on alcohol use. Concern that Pt is drinking more than 3 beers a day. CAGE 1/4    Pt would benefit from talk therapy. States that he would like to peruse medications before considering talk therapy.     At this time I feel like this Pt would benefit from an IOP program.  Pamphlet for [facility] was given to Pt in clinic.  Pt was instructed to call the number on the back and make an appointment.      At this time Pt does not meet PEC criteria.  Denies SI/HI/AVH, not gravely disabled.  Pt extensively informed to call 911 or go to your nearest ED if you experience thoughts of hurting yourself.  Pt is amendable to plan.       Discussed diagnosis, risks and benefits of proposed treatment above vs alternative treatments vs no treatment, and potential side effects of these treatments, and the inherent unpredictability of individual response to treatment.  The patient expresses understanding and gives informed consent to pursue treatment.  The potential benefits outweigh the potential risks. Patient has no other questions. Risks/adverse effects discussed at this time including but not limited to: GI side effects, sexual dysfunction, activation vs sedation, triggering of suicidal thoughts, and serotonin syndrome.    Serotonin syndrome   Mental status changes can include anxiety, restlessness, disorientation, and agitated delirium.    Autonomic manifestations can include diaphoresis, tachycardia, hyperthermia, hypertension, vomiting, and diarrhea   Neuromuscular hyperactivity can manifest as tremor, myoclonus, hyperreflexia, rigidity, hyperthermia, seizure, and bilateral Babinski  sign.   Pt was informed that if they experience any of these symptoms to go the ED.       Difficulty Sleeping Behavioral Modification:  Implement stimulus control: [place] bedroom for sleep only. Leave bedroom when frustrated from not sleeping. Engage in relaxation before returning. Engage in activities during the day. AVOID >7-8 h time in bed  Avoid clock watching  Avoid thinking/worrying about sleep when trying to fall asleep  Limit caffeinated consumption  Make sure the bedroom is dark, quiet and cool    Safety Plan   Patient voices understanding and agreement with this plan  Provided crisis numbers  Encouraged patient to keep future appointments.  Instructed patient to call or message with questions or concerns  In the event of an emergency, including suicidal ideation, patient was advised to go to the emergency room and/or call 911    Return to Clinic: 2 weeks    Psychotherapy:  Target symptoms: alcohol abuse, depression, anxiety   Why chosen therapy is appropriate versus another modality: relevant to diagnosis, evidence based practice  Outcome monitoring methods: self-report, observation  Therapeutic intervention type: insight oriented psychotherapy, interactive psychotherapy  Topics discussed/themes: relationships difficulties, stress related to medical comorbidities, difficulty managing affect in interpersonal relationships, building skills sets for symptom management, symptom recognition  The patient's response to the intervention is guarded. The patient's progress toward treatment goals is fair.   Duration of intervention: 17 minutes.    Total face to face time: 60 min  Total time (chart review, patient contact, documentation): 80 min  Obtained collateral from wife    A diagnostic psychiatric evaluation was performed and responsiveness to treatment was assessed.  The patient demonstrates adequate ability/capacity to respond to treatment.    Yash Blanchard PA-C      *This note has been prepared using a  combination of a dictation device and typing.  It has been checked for errors but some errors may still exist within the note as a result of speech recognition errors and/or typographical errors.

## 2025-02-28 ENCOUNTER — OFFICE VISIT (OUTPATIENT)
Dept: OTOLARYNGOLOGY | Facility: CLINIC | Age: 55
End: 2025-02-28
Payer: COMMERCIAL

## 2025-02-28 ENCOUNTER — CLINICAL SUPPORT (OUTPATIENT)
Dept: OTOLARYNGOLOGY | Facility: CLINIC | Age: 55
End: 2025-02-28
Payer: COMMERCIAL

## 2025-02-28 VITALS
HEART RATE: 67 BPM | WEIGHT: 194.88 LBS | SYSTOLIC BLOOD PRESSURE: 111 MMHG | BODY MASS INDEX: 27.96 KG/M2 | DIASTOLIC BLOOD PRESSURE: 69 MMHG

## 2025-02-28 DIAGNOSIS — H93.13 TINNITUS OF BOTH EARS: ICD-10-CM

## 2025-02-28 DIAGNOSIS — R42 VERTIGO: ICD-10-CM

## 2025-02-28 DIAGNOSIS — H90.3 ASYMMETRICAL SENSORINEURAL HEARING LOSS: Primary | ICD-10-CM

## 2025-02-28 DIAGNOSIS — H90.3 ASYMMETRIC SNHL (SENSORINEURAL HEARING LOSS): Primary | ICD-10-CM

## 2025-02-28 PROCEDURE — 99999 PR PBB SHADOW E&M-EST. PATIENT-LVL I: CPT | Mod: PBBFAC,,,

## 2025-02-28 PROCEDURE — 99999 PR PBB SHADOW E&M-EST. PATIENT-LVL IV: CPT | Mod: PBBFAC,,, | Performed by: NURSE PRACTITIONER

## 2025-02-28 NOTE — PROGRESS NOTES
Chief Complaint   Patient presents with    Hearing Loss    Tinnitus       HPI:  Jaswinder Valle is a very pleasant 55 y.o. male referred to me by Cheryle Isaacs in consultation for evaluation of hearing loss.  He does not perceive hearing loss but his wife told him he needs a hearing test.  He has not noted any difference in hearing between the ears, with either ear being the worse hearing ear. He has noted tinnitus in both ears.  He has not had any recent issues with ear pain or ear drainage. He denies a family history of hearing loss, and has not had any previous otologic surgery. He has a history of significant loud noise exposure (). He has issues with dizziness that is intermittent. Describes it as a spinning sensation and lasts a few seconds. Occur with turning head.       Past Medical History:   Diagnosis Date    Diabetes mellitus type 2 without retinopathy 6/30/2016    Diabetes mellitus, type 2     Diabetes type 2, uncontrolled 6/29/2016    Nuclear sclerosis of both eyes 6/30/2016     Social History[1]   Past Surgical History:   Procedure Laterality Date    COLONOSCOPY N/A 5/16/2018    Procedure: COLONOSCOPY;  Surgeon: Jovany Mendiola MD;  Location: [facility] ENDO;  Service: Endoscopy;  Laterality: N/A;    LEFT HEART CATHETERIZATION Left 7/6/2022    Procedure: Left heart cath;  Surgeon: Km Patel MD;  Location: [facility] CATH LAB;  Service: Cardiology;  Laterality: Left;  RN PREOP ON 6/29/22.--NEED H/P     Family History   Problem Relation Name Age of Onset    Cataracts Mother      Diabetes Mother      No Known Problems Father      No Known Problems Sister      No Known Problems Brother      No Known Problems Maternal Aunt      No Known Problems Maternal Uncle      No Known Problems Paternal Aunt      No Known Problems Paternal Uncle      No Known Problems Maternal Grandmother      No Known Problems Maternal Grandfather      No Known Problems Paternal Grandmother      No Known Problems Paternal  Grandfather      Amblyopia Neg Hx      Blindness Neg Hx      Cancer Neg Hx      Hypertension Neg Hx      Macular degeneration Neg Hx      Retinal detachment Neg Hx      Strabismus Neg Hx      Stroke Neg Hx      Thyroid disease Neg Hx      Glaucoma Neg Hx           Review of Systems  General: negative for chills, fever or weight loss  Psychological: negative for mood changes or depression  Ophthalmic: negative for blurry vision, photophobia or eye pain  ENT: see HPI  Respiratory: no cough, shortness of breath, or wheezing  Cardiovascular: no chest pain or dyspnea on exertion  Gastrointestinal: no abdominal pain, change in bowel habits, or black/ bloody stools  Musculoskeletal: negative for gait disturbance or muscular weakness  Neurological: no syncope or seizures; no ataxia  Dermatological: negative for puritis,  rash and jaundice  Hematologic/lymphatic: no easy bruising, no new lumps or bumps      Physical Exam:    Vitals:    02/28/25 0837   BP: 111/69   Pulse: 67       Constitutional: Well appearing / communicating without difficutly.  NAD.  Eyes: EOM I Bilaterally  Head/Face: Normocephalic.  Negative paranasal sinus pressure/tenderness.  Salivary glands WNL.  House Brackmann I Bilaterally.    Right Ear: Auricle normal appearance. External Auditory Canal within normal limits no lesions or masses,TM w/o masses/lesions/perforations. TM mobility noted.   Left Ear: Auricle normal appearance. External Auditory Canal within normal limits no lesions or masses,TM w/o masses/lesions/perforations. TM mobility noted.  Vestibular exam:  Romberg; negative Right Dixx- Hallpike; negative left Dixx- Hallpike  Nose: No gross nasal septal deviation. Inferior Turbinates 3+ bilaterally. No septal perforation. No masses/lesions. External nasal skin appears normal without masses/lesions.  Oral Cavity: Gingiva/lips within normal limits.  Dentition/gingiva healthy appearing. Mucus membranes moist. Floor of mouth soft, no masses palpated.  Oral Tongue mobile. Hard Palate appears normal.    Oropharynx: Base of tongue appears normal. No masses/lesions noted. Tonsillar fossa/pharyngeal wall without lesions. Posterior oropharynx WNL.  Soft palate without masses. Midline uvula.   Neck/Lymphatic: No LAD I-VI bilaterally.  No thyromegaly.  No masses noted on exam.      Diagnostic studies:  Audiogram interpreted personally by me and discussed in detail with the patient today.   Pure tone testing revealed mild sloping to moderate sensorineural hearing loss in the right ear and moderate sensorineural hearing loss in the left ear. Speech reception thresholds were obtained at 30 dBHL in the right ear and 35 dBHL in the left ear. Speech discrimination scores were 88% in the right ear and 84% in the left ear. Tympanometry revealed Type A tympanograms in both ears.       Assessment:    ICD-10-CM ICD-9-CM    1. Asymmetric SNHL (sensorineural hearing loss)  H90.3 389.16 MRI IAC/Temporal Bones W W/O Contrast      POCT CREATININE      2. Tinnitus of both ears  H93.13 388.30 Ambulatory referral/consult to ENT      3. Vertigo  R42 780.4 MRI IAC/Temporal Bones W W/O Contrast        The primary encounter diagnosis was Asymmetric SNHL (sensorineural hearing loss). Diagnoses of Tinnitus of both ears and Vertigo were also pertinent to this visit.      Plan:  Orders Placed This Encounter   Procedures    MRI IAC/Temporal Bones W W/O Contrast       -We reviewed the recent audiogram, and the fact that there is a distinct asymmetry of at least 10 dB across a few frequencies.  We discussed that as humans we are not entirely symmetric, and that many people have one ear that hears better than the other.  However, considering the degree of asymmetry, I would recommend and MRI of the IAC with contrast to evaluate for any retrocochlear lesions.  If that is normal, the patient will continue to follow with annual audiograms. Also would recommend hearing aids if MRI is unremarkable.     -We  also discussed that tinnitus is most often caused by a hearing loss, and that as the hair cells are damaged, either genetic or as a result of loud noise exposure, they then cause tinnitus.  Some patients find that restricting the salt or caffeine in their diet helps.  Tinnitus tends to be louder in times of stress and fatigue, and may decrease with time.  Sound machines may also be an effective masking technique if needed at night.  -dizziness is most likely due to BPPV. Dixx-hallpikes negative bilaterally today  -recommended vestibular therapy which he declines right now.   -follow up 1 year or sooner as needed        Nataliya Rdz NP            Answers submitted by the patient for this visit:  Review of Symptoms Questionnaire  (Submitted on 2/24/2025)  Fatigue (Tiredness)?: Yes  appetite change : Yes  hearing loss: Yes  Light sensitivity / Light hurts the eyes?: Yes  Snoring?: Yes  None of these : Yes  None of these: Yes  back pain: Yes  neck pain: Yes  None of these : Yes  None of these: Yes  None of these : Yes  headaches: Yes  Light-headedness: Yes  None of these: Yes  decreased concentration: Yes  Feeling depressed?: Yes  sleep disturbance: Yes         [1]   Social History  Socioeconomic History    Marital status: Single   Tobacco Use    Smoking status: Never    Smokeless tobacco: Never   Substance and Sexual Activity    Alcohol use: Yes    Drug use: No    Sexual activity: Yes     Partners: Female     Social Drivers of Health     Financial Resource Strain: Low Risk  (12/19/2023)    Overall Financial Resource Strain (CARDIA)     Difficulty of Paying Living Expenses: Not hard at all   Food Insecurity: No Food Insecurity (2/10/2025)    Hunger Vital Sign     Worried About Running Out of Food in the Last Year: Never true     Ran Out of Food in the Last Year: Never true   Transportation Needs: No Transportation Needs (12/19/2023)    PRAPARE - Transportation     Lack of Transportation (Medical): No     Lack of  Transportation (Non-Medical): No   Physical Activity: Inactive (2/10/2025)    Exercise Vital Sign     Days of Exercise per Week: 0 days     Minutes of Exercise per Session: 0 min   Stress: Stress Concern Present (2/10/2025)    [employer] - Occupational Stress Questionnaire     Feeling of Stress : Very much   Housing Stability: High Risk (2/10/2025)    Housing Stability Vital Sign     Unable to Pay for Housing in the Last Year: Yes

## 2025-02-28 NOTE — PROGRESS NOTES
"Jaswinder Valle, a 55 y.o. male was seen today in the clinic for an audiologic evaluation. The patient's primary complaint was hearing loss and "ringing" in both ears.  He reports right ear hearing perception is better than left. He does experience some dizziness with change in head position. History of noise exposure includes 13 years of  service.    Pure tone testing revealed mild sloping to moderate sensorineural hearing loss in the right ear and moderate sensorineural hearing loss in the left ear. Speech reception thresholds were obtained at 30 dBHL in the right ear and 35 dBHL in the left ear. Speech discrimination scores were 88% in the right ear and 84% in the left ear. Tympanometry revealed Type A tympanograms in both ears.    Results were reviewed with the patient and the recommendation of a hearing aid consultation after medical clearance was discussed.    Recommendations:  Otologic evaluation  Annual audiologic evaluation  Hearing protection in noise  Hearing aid consultation after medical clearance          "

## 2025-04-02 ENCOUNTER — OFFICE VISIT (OUTPATIENT)
Dept: PSYCHIATRY | Facility: CLINIC | Age: 55
End: 2025-04-02
Payer: COMMERCIAL

## 2025-04-02 VITALS
WEIGHT: 193.13 LBS | HEIGHT: 70 IN | DIASTOLIC BLOOD PRESSURE: 70 MMHG | BODY MASS INDEX: 27.65 KG/M2 | HEART RATE: 88 BPM | SYSTOLIC BLOOD PRESSURE: 117 MMHG

## 2025-04-02 DIAGNOSIS — F43.10 PTSD (POST-TRAUMATIC STRESS DISORDER): Primary | ICD-10-CM

## 2025-04-02 DIAGNOSIS — F33.1 MDD (MAJOR DEPRESSIVE DISORDER), RECURRENT EPISODE, MODERATE: ICD-10-CM

## 2025-04-02 DIAGNOSIS — F41.1 GAD (GENERALIZED ANXIETY DISORDER): ICD-10-CM

## 2025-04-02 DIAGNOSIS — F10.10 ALCOHOL ABUSE: ICD-10-CM

## 2025-04-02 PROCEDURE — 99999 PR PBB SHADOW E&M-EST. PATIENT-LVL III: CPT | Mod: PBBFAC,,,

## 2025-04-02 PROCEDURE — G2211 COMPLEX E/M VISIT ADD ON: HCPCS | Mod: S$GLB,,,

## 2025-04-02 PROCEDURE — 99214 OFFICE O/P EST MOD 30 MIN: CPT | Mod: S$GLB,,,

## 2025-04-02 RX ORDER — MIRTAZAPINE 7.5 MG/1
7.5 TABLET, FILM COATED ORAL NIGHTLY
Qty: 90 TABLET | Refills: 0 | Status: SHIPPED | OUTPATIENT
Start: 2025-04-02 | End: 2025-07-01

## 2025-04-02 RX ORDER — PRAZOSIN HYDROCHLORIDE 1 MG/1
1 CAPSULE ORAL NIGHTLY
Qty: 90 CAPSULE | Refills: 0 | Status: SHIPPED | OUTPATIENT
Start: 2025-04-02 | End: 2025-07-01

## 2025-04-02 NOTE — PROGRESS NOTES
"Outpatient Psychiatry Follow-Up Visit   4/2/2025    Clinical Status of Patient:  Outpatient (Ambulatory)    Chief Complaint:  Jaswinder Valle is a 55 y.o. male who presents today for follow-up of depression and anxiety.  Met with patient.      Interval History and Content of Current Session 04/02/2025:    Pt is A+Ox 4.  Patients mood is "better", affect appears congruent and appropriate. Pts thought process is normal and logical.  Pts speech is normal tone, normal rate, normal pitch, normal volume   Linear and logical, friendly and cooperative, normal eye contact, no psychomotor retardation.  Pt is calmly seated in chair during interview. Pt is casually dressed and well groomed.      Patient states that he has been doing better since our previous appointment. Continues to take effexor 37.5 MG daily, remeron 7.5 MG QHS, and prazosin 1MG QHS. Patient states that since adjusting medication he has seen an improvement in anxiety, depression, and insomnia. Patient continues to endorse issues with low libido. Patient is requesting to trial alternative medication to Effexor.  Patient states that he has cut back on alcohol consumption. Today he states that he was originally drinking a case of beer 3 times a week. States currently he's drinking 3 to 6 beers 3 x  week.    Pt reports  taking medications as prescribed and behaving appropriately during interview today.  Denies SI/HI/AVH. Pt reports sleeping better and normal appetite.   Denies recreational drug use. Pt reports 3-6 beers a day drinks per week, denies tobacco use, denies Vaping, denies Caffeine.      Standardized Screenings tools:   PHQ9: 13  ROBERTO- 7: 10    Prior visit:  Psych Interview 02/26/2025:   Jaswinder Valle is a 55 y.o. male with past psychiatric history of anxiety and depression  presented to for initial evaluation and treatment for Anxiety and depression.     Pt is A+Ox 4.  Patients mood is "not good", affect appears flat, guarded, sad. Pts thought " process is normal and logical.  Pts speech is normal tone, normal rate, normal pitch, normal volume  Cooperative, normal eye contact, no psychomotor retardation.  Pt is calmly seated in chair during interview.  Pt is casually dressed and well groomed.       Pertinent PMH: HIV (+), CHF, CAD, T2DM     Patient was never previously being seen by Psychiatry, was being treated by PCP.  Pt states that they are currently taking effexor 75mg daily for 2 weeks, endorses low libido and anorgasmia form SSRI.  Patient endorses acute on chronic anxiety, depression, nightmares, and flashbacks which began approximately 5 years ago after the death of his father and have recently worsened over the last two years due to symptoms of tinnitus and headaches. Patient states that him and his father were close and he has not grieved appropriately. Patient states that he began experiencing tinnitus and daily headaches for the last two years, he has appointment with ENT later this month. 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 Pt denies symptoms including nightmares, hypervigilance, flashbacks, avoidance behaviors, and disassociation.  Patient states that he did not have adequate hearing protection. Patient endorses an incident where he was sleeping in a tank and a canister fell and hit him in the head knocking him unconscious. Patient did not report incident due to fear that he would be punished.  Patient has two daughters 38 y/o and 26 y/o. . Patient states that his biggest stressor currently is his daughter's health, 27 year old was just put on dialysis and a kidney transplant list. 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 Patient sleeps approximately 2 to three hours per night.     denies prior hx of psychiatric hospitalizations. denies hx of suicide attempts. Pt  denies hx self harm. Pt endorses hx hallucinations after father .  Pt denies hx of eating disorders.   Denies physical/sexual abuse.   Reports depression today as 8/10, and anxiety as 8/10.  Reports sleeping 2-3 hrs per night, and poor appetite.   Denies SI/HI/AVH. Denies side effects of medications.  Pt states that there support consists of - sister   Access to Gun - endorses.    Denies recreational drug use. Pt reports 3 beers a day drinks per week, denies tobacco use, denies Vaping, denies Caffeine.       Current Medication:  Effexor 75mg daily      Past Medications:  none     Collateral obtained from Wife (Maritza Valle) [phone]              Wife states she is not concerned about patient safety. Wife states that she has never been concerned about patient safety. Patient endorses that there are guns at the house, wife states that they're there for self protection.  Wife states that she has noticed an increase in irritability and mood swings which began approximately 2 years ago. Wife denies any true hypomanic episodes. Wife states that currently patient is experiencing daily tinnitus and daily headaches Which are affecting mood.        DX:  The patient complained of depressed mood with lethargy, decreased appetite , insomnia, psycho-motor retardation, anhedonia, apathy, worsening self-esteem, guilt, decreased concentration and ability to make decisions.      Pt denies hx symptoms/episodes of scarlett.     Admits to symptoms of anxiety including excessive anxiety/worry/fear, more days than not, about numerous issues, difficulty controlling the worry, over thinking, rumination, restlessness, poor concentration, fatigue, and increased irritability. Denies panic attacks at this time.      Standardized Screenings tools:   PHQ9: 20  ROBERTO- 7: 19    Past Psychiatric History:   Previous Psychiatric Hospitalizations: NO  Previous Medication Trials: YES:      History of psychotherapy:  NO  Previous Suicide Attempts:  NO  History of Violence:  NO  History of physical/sexual abuse: NO  Outpatient psychiatric provider(past): NO     Substance Abuse History:   Tobacco: NO  Alcohol: YES: 3 beers per day     Illicit Substances: NO  Detox/Rehab: NO     Neurological History:   Seizures: NO  Head trauma: YES: Football in HS and while in the  - aldo fell on Pt in the head.     Family Psychiatric History: Yes  Sister - depression      Social History:  Developmental/Childhood:Achieved all developmental milestones timely  *Education:Bachelor's Degree  Employment Status/Finances:Employed   Relationship Status/Sexual Orientation: : Relationship intact  Children: 2  Housing Status: Home    history:  YES:      Access to gun: YES:      Druze:Actively participates in organized Uatsdin  Recreational activities:Time with family  Person patient is closest to/confides in: sister and wife     Legal History:   Past Charges/Incarcerations:  No      Review of Systems     Review of Systems   Constitutional:  Negative for weight loss.   HENT:  Negative for tinnitus.    Eyes:  Negative for blurred vision.   Respiratory:  Negative for cough and shortness of breath.    Cardiovascular:  Negative for chest pain.   Gastrointestinal:  Negative for abdominal pain.   Genitourinary:  Negative for dysuria.   Musculoskeletal:  Negative for back pain and neck pain.   Skin:  Negative for rash.   Neurological:  Negative for dizziness, seizures and weakness.   Psychiatric/Behavioral:  Positive for depression. Negative for hallucinations, memory loss, substance abuse and suicidal ideas. The patient is nervous/anxious and has insomnia.          Past Medical, Family and Social History: The patient's past medical, family and social history have been reviewed and updated as appropriate within the electronic medical record - see encounter notes.      Current Medications:   Medication List with Changes/Refills   New Medications     "DEXTROMETHORPHAN-BUPROPION  MG TBIE    Take 1 tablet by mouth 2 (two) times daily.   Current Medications    ASPIRIN 81 MG CHEW    CHEW AND SWALLOW 1 TABLET BY MOUTH ONCE DAILY    ATORVASTATIN (LIPITOR) 40 MG TABLET    Take 1 tablet (40 mg total) by mouth every evening.    GLIPIZIDE 5 MG TR24    Take 1 tablet (5 mg total) by mouth daily with breakfast.    LOSARTAN (COZAAR) 25 MG TABLET    Take 1 tablet (25 mg total) by mouth once daily.    OZEMPIC 1 MG/DOSE (4 MG/3 ML)    INJECT 1MG SUBCUTANEOUSLY  EVERY 7 DAYS   Changed and/or Refilled Medications    Modified Medication Previous Medication    MIRTAZAPINE (REMERON) 7.5 MG TAB mirtazapine (REMERON) 7.5 MG Tab       Take 1 tablet (7.5 mg total) by mouth every evening.    Take 1 tablet (7.5 mg total) by mouth every evening.    PRAZOSIN (MINIPRESS) 1 MG CAP prazosin (MINIPRESS) 1 MG Cap       Take 1 capsule (1 mg total) by mouth every evening.    Take 1 capsule (1 mg total) by mouth every evening.   Discontinued Medications    VENLAFAXINE (EFFEXOR-XR) 37.5 MG 24 HR CAPSULE    Take 1 capsule (37.5 mg total) by mouth once daily.         Allergies:   Review of patient's allergies indicates:   Allergen Reactions    No known drug allergies          Vitals   Vitals:    04/02/25 1421   BP: 117/70   Pulse: 88          Labs/Imaging/Studies:   No results found for this or any previous visit (from the past 48 hours).   No results found for: "PHENYTOIN", "PHENOBARB", "VALPROATE", "CBMZ"    Compliance: yes    Side effects: see above    Risk Parameters:  Patient reports no suicidal ideation  Patient reports no homicidal ideation  Patient reports no self-injurious behavior  Patient reports no violent behavior    Exam (detailed: at least 9 elements; comprehensive: all 15 elements)   Constitutional  Vitals:  Most recent vital signs, dated less than 90 days prior to this appointment, were reviewed.   Vitals:    04/02/25 1421   BP: 117/70   Pulse: 88   Weight: 87.6 kg (193 lb 2 oz) " "  Height: 5' 10" (1.778 m)        General:  unremarkable, age appropriate     Musculoskeletal  Muscle Strength/Tone:  no spasicity, no rigidity, no cogwheeling, no flaccidity, no paratonia, no dyskinesia, no dystonia, no tremor, no tic, no choreoathetosis, no atrophy   Gait & Station:  non-ataxic     Psychiatric  Speech:  no latency; no press   Mood & Affect:  steady  congruent and appropriate   Thought Process:  normal and logical   Associations:  intact   Thought Content:  normal, no suicidality, no homicidality, delusions, or paranoia   Insight:  intact, has awareness of illness   Judgement: behavior is adequate to circumstances, age appropriate   Orientation:  grossly intact, person, place, situation, time/date   Memory: intact for content of interview, grossly intact, memory >3 objects at five mins   Language: grossly intact, able to name, able to repeat   Attention Span & Concentration:  able to focus, completed tasks   Fund of Knowledge:  intact and appropriate to age and level of education, familiar with aspects of current personal life     Assessment and Diagnosis   Status/Progress: Based on the examination today, the patient's problem(s) is/are improved.  New problems have not been presented today.       General Impression:      ICD-10-CM ICD-9-CM   1. PTSD (post-traumatic stress disorder)  F43.10 309.81   2. MDD (major depressive disorder), recurrent episode, moderate  F33.1 296.32   3. ROBERTO (generalized anxiety disorder)  F41.1 300.02   4. Alcohol abuse  F10.10 305.00       Intervention/Counseling/Treatment Plan   Mood   Continue Prazosin 1mg QHS - targeting nightmares  Continue Remeron 7.5mg QHS - targeting mood and insomnia   Stop Effexor   Start Auvelity 45-105mg daily - targeting depression         Pt signed RAMIN for Wife (Maritza Valle) [phone] on 2/26/2025. Scanned into chart      Encouraged Pt to cut back on alcohol use. Concern that Pt is drinking more than 3-6 beers a day. CAGE 1/4     Pt " would benefit from talk therapy. States that he would like to peruse medications before considering talk therapy.          Discussed diagnosis, risks and benefits of proposed treatment above vs alternative treatments vs no treatment, and potential side effects of these treatments, and the inherent unpredictability of individual response to treatment.  The patient expresses understanding and gives informed consent to pursue treatment.  The potential benefits outweigh the potential risks. Patient has no other questions. Risks/adverse effects discussed at this time including but not limited to: GI side effects, sexual dysfunction, activation vs sedation, triggering of suicidal thoughts, and serotonin syndrome.    Serotonin syndrome   Mental status changes can include anxiety, restlessness, disorientation, and agitated delirium.    Autonomic manifestations can include diaphoresis, tachycardia, hyperthermia, hypertension, vomiting, and diarrhea   Neuromuscular hyperactivity can manifest as tremor, myoclonus, hyperreflexia, rigidity, hyperthermia, seizure, and bilateral Babinski sign.   Pt was informed that if they experience any of these symptoms to go the ED.     Difficulty Sleeping Behavioral Modification:  Implement stimulus control: [place] bedroom for sleep only. Leave bedroom when frustrated from not sleeping. Engage in relaxation before returning. Engage in activities during the day. AVOID >7-8 h time in bed  Avoid clock watching  Avoid thinking/worrying about sleep when trying to fall asleep  Limit caffeinated consumption  Make sure the bedroom is dark, quiet and cool    Safety Plan   Patient voices understanding and agreement with this plan  Provided crisis numbers  Encouraged patient to keep future appointments.  Instructed patient to call or message with questions or concerns  In the event of an emergency, including suicidal ideation, patient was advised to go to the emergency room and/or call 911    Return to  Clinic: 1 month    Psychotherapy:  Target symptoms: alcohol abuse, depression, anxiety   Why chosen therapy is appropriate versus another modality: relevant to diagnosis, evidence based practice  Outcome monitoring methods: self-report, observation  Therapeutic intervention type: insight oriented psychotherapy, interactive psychotherapy  Topics discussed/themes: relationships difficulties, stress related to medical comorbidities, difficulty managing affect in interpersonal relationships, building skills sets for symptom management, symptom recognition  The patient's response to the intervention is guarded. The patient's progress toward treatment goals is fair.   Duration of intervention: 15 minutes.    Total face to face time: 30 min  Total time (chart review, patient contact, documentation): 35 min    A diagnostic psychiatric evaluation was performed and responsiveness to treatment was assessed.  The patient demonstrates adequate ability/capacity to respond to treatment.    Yash Blanchard PA-C    *This note has been prepared using a combination of a dictation device and typing.  It has been checked for errors but some errors may still exist within the note as a result of speech recognition errors and/or typographical errors.

## 2025-04-14 ENCOUNTER — PATIENT MESSAGE (OUTPATIENT)
Dept: PSYCHIATRY | Facility: CLINIC | Age: 55
End: 2025-04-14
Payer: COMMERCIAL

## 2025-04-14 RX ORDER — VILAZODONE HYDROCHLORIDE 10 MG/1
10 TABLET ORAL DAILY
Qty: 30 TABLET | Refills: 1 | Status: SHIPPED | OUTPATIENT
Start: 2025-04-14 | End: 2025-06-13

## 2025-05-08 ENCOUNTER — PATIENT MESSAGE (OUTPATIENT)
Dept: CARDIOLOGY | Facility: CLINIC | Age: 55
End: 2025-05-08
Payer: COMMERCIAL

## 2025-05-08 ENCOUNTER — HOSPITAL ENCOUNTER (OUTPATIENT)
Dept: CARDIOLOGY | Facility: HOSPITAL | Age: 55
Discharge: HOME | End: 2025-05-08
Attending: INTERNAL MEDICINE
Payer: COMMERCIAL

## 2025-05-08 VITALS — WEIGHT: 194 LBS | BODY MASS INDEX: 27.77 KG/M2 | HEIGHT: 70 IN

## 2025-05-08 DIAGNOSIS — I50.9 CONGESTIVE HEART FAILURE, UNSPECIFIED HF CHRONICITY, UNSPECIFIED HEART FAILURE TYPE: ICD-10-CM

## 2025-05-08 LAB
AORTIC SIZE INDEX: 1.2 CM/M2
APICAL FOUR CHAMBER EJECTION FRACTION: 49 %
ASCENDING AORTA: 2.4 CM
AV INDEX (PROSTH): 0.96
AV MEAN GRADIENT: 2 MMHG
AV PEAK GRADIENT: 4 MMHG
AV VALVE AREA BY VELOCITY RATIO: 3 CM²
AV VALVE AREA: 3.7 CM²
AV VELOCITY RATIO: 0.8
BSA FOR ECHO PROCEDURE: 2.08 M2
CV ECHO LV RWT: 0.27 CM
DOP CALC AO PEAK VEL: 1 M/S
DOP CALC AO VTI: 16.2 CM
DOP CALC LVOT AREA: 3.8 CM2
DOP CALC LVOT DIAMETER: 2.2 CM
DOP CALC LVOT PEAK VEL: 0.8 M/S
DOP CALC LVOT STROKE VOLUME: 59.3 CM3
DOP CALC MV VTI: 12.6 CM
DOP CALCLVOT PEAK VEL VTI: 15.6 CM
E WAVE DECELERATION TIME: 93 MSEC
E/A RATIO: 0.82
E/E' RATIO: 7 M/S
ECHO LV POSTERIOR WALL: 0.7 CM (ref 0.6–1.1)
FRACTIONAL SHORTENING: 23.1 % (ref 28–44)
GLOBAL LONGITUIDAL STRAIN: 11.5 %
INTERVENTRICULAR SEPTUM: 0.9 CM (ref 0.6–1.1)
IVC DIAMETER: 0.97 CM
IVRT: 99 MSEC
LEFT ATRIUM AREA SYSTOLIC (APICAL 4 CHAMBER): 10.65 CM2
LEFT INTERNAL DIMENSION IN SYSTOLE: 4 CM (ref 2.1–4)
LEFT VENTRICLE DIASTOLIC VOLUME INDEX: 63.59 ML/M2
LEFT VENTRICLE DIASTOLIC VOLUME: 131 ML
LEFT VENTRICLE END DIASTOLIC VOLUME APICAL 4 CHAMBER: 99.15 ML
LEFT VENTRICLE END SYSTOLIC VOLUME APICAL 4 CHAMBER: 22.14 ML
LEFT VENTRICLE MASS INDEX: 70.5 G/M2
LEFT VENTRICLE SYSTOLIC VOLUME INDEX: 33 ML/M2
LEFT VENTRICLE SYSTOLIC VOLUME: 68 ML
LEFT VENTRICULAR INTERNAL DIMENSION IN DIASTOLE: 5.2 CM (ref 3.5–6)
LEFT VENTRICULAR MASS: 145.2 G
LV LATERAL E/E' RATIO: 5.9 M/S
LV SEPTAL E/E' RATIO: 7.8 M/S
LVED V (TEICH): 130.88 ML
LVES V (TEICH): 68.05 ML
LVOT MG: 1.64 MMHG
LVOT MV: 0.62 CM/S
MV A" WAVE DURATION": 87.54 MSEC
MV MEAN GRADIENT: 1 MMHG
MV PEAK A VEL: 0.57 M/S
MV PEAK E VEL: 0.47 M/S
MV PEAK GRADIENT: 3 MMHG
MV STENOSIS PRESSURE HALF TIME: 27.01 MS
MV VALVE AREA BY CONTINUITY EQUATION: 4.7 CM2
MV VALVE AREA P 1/2 METHOD: 8.15 CM2
OHS CV RV/LV RATIO: 0.69 CM
PV PEAK GRADIENT: 2 MMHG
PV PEAK VELOCITY: 0.77 M/S
RA PRESSURE ESTIMATED: 3 MMHG
RA VOL SYS: 29.23 ML
RIGHT ATRIAL AREA: 12.6 CM2
RIGHT ATRIUM VOLUME AREA LENGTH APICAL 4 CHAMBER: 27.62 ML
RIGHT VENTRICLE DIASTOLIC BASEL DIMENSION: 3.6 CM
RV TISSUE DOPPLER FREE WALL SYSTOLIC VELOCITY 1 (APICAL 4 CHAMBER VIEW): 9.92 CM/S
SINUS: 3 CM
STJ: 2.6 CM
TDI LATERAL: 0.08 M/S
TDI SEPTAL: 0.06 M/S
TDI: 0.07 M/S
TRICUSPID ANNULAR PLANE SYSTOLIC EXCURSION: 1.9 CM
Z-SCORE OF LEFT VENTRICULAR DIMENSION IN END DIASTOLE: -1.79
Z-SCORE OF LEFT VENTRICULAR DIMENSION IN END SYSTOLE: 0.39

## 2025-05-08 PROCEDURE — 93306 TTE W/DOPPLER COMPLETE: CPT | Mod: 26,,, | Performed by: INTERNAL MEDICINE

## 2025-05-08 PROCEDURE — 93356 MYOCRD STRAIN IMG SPCKL TRCK: CPT | Mod: ,,, | Performed by: INTERNAL MEDICINE

## 2025-05-08 PROCEDURE — 93356 MYOCRD STRAIN IMG SPCKL TRCK: CPT | Mod: PN

## 2025-06-10 ENCOUNTER — PATIENT MESSAGE (OUTPATIENT)
Dept: FAMILY MEDICINE | Facility: CLINIC | Age: 55
End: 2025-06-10
Payer: COMMERCIAL

## 2025-06-16 ENCOUNTER — OFFICE VISIT (OUTPATIENT)
Dept: CARDIOLOGY | Facility: CLINIC | Age: 55
End: 2025-06-16
Payer: COMMERCIAL

## 2025-06-16 VITALS
HEIGHT: 70 IN | HEART RATE: 84 BPM | OXYGEN SATURATION: 98 % | WEIGHT: 195.13 LBS | DIASTOLIC BLOOD PRESSURE: 79 MMHG | SYSTOLIC BLOOD PRESSURE: 124 MMHG | RESPIRATION RATE: 18 BRPM | BODY MASS INDEX: 27.94 KG/M2

## 2025-06-16 DIAGNOSIS — I50.9 CONGESTIVE HEART FAILURE, UNSPECIFIED HF CHRONICITY, UNSPECIFIED HEART FAILURE TYPE: Primary | ICD-10-CM

## 2025-06-16 DIAGNOSIS — I25.10 CORONARY ARTERY DISEASE, UNSPECIFIED VESSEL OR LESION TYPE, UNSPECIFIED WHETHER ANGINA PRESENT, UNSPECIFIED WHETHER NATIVE OR TRANSPLANTED HEART: ICD-10-CM

## 2025-06-16 PROCEDURE — 99214 OFFICE O/P EST MOD 30 MIN: CPT | Mod: S$GLB,,, | Performed by: INTERNAL MEDICINE

## 2025-06-16 PROCEDURE — 99999 PR PBB SHADOW E&M-EST. PATIENT-LVL IV: CPT | Mod: PBBFAC,,, | Performed by: INTERNAL MEDICINE

## 2025-06-16 PROCEDURE — G2211 COMPLEX E/M VISIT ADD ON: HCPCS | Mod: S$GLB,,, | Performed by: INTERNAL MEDICINE

## 2025-06-16 PROCEDURE — 93000 ELECTROCARDIOGRAM COMPLETE: CPT | Mod: S$GLB,,, | Performed by: INTERNAL MEDICINE

## 2025-06-16 RX ORDER — METOPROLOL SUCCINATE 25 MG/1
25 TABLET, EXTENDED RELEASE ORAL DAILY
Qty: 90 TABLET | Refills: 3 | Status: SHIPPED | OUTPATIENT
Start: 2025-06-16

## 2025-06-16 NOTE — PROGRESS NOTES
CARDIOVASCULAR CONSULTATION    REASON FOR CONSULT:   Jaswinder Valle is a 55 y.o. male who presents for   Chief Complaint   Patient presents with    Follow-up        Referred by:  No referring provider defined for this encounter.      HISTORY OF PRESENT ILLNESS:     Patient is a pleasant 49-year-old man who was recently diagnosed with diabetes.  States that he has a very active person and denies any chest pains at rest on exertion, orthopnea, PND.  States that he has to climb multiple flights of stair at has never had any issues with that.  Wants to optimize his cardiovascular risk     Notes from October 2019:  Patient here for follow-up today.  States that he does not want to take Lipitor and never even started..  He wants to changes diet and exercise and lifestyle patterns and see how that affects his diabetes and his lipid profile.  We spent a considerable amount of time talking about the data behind diet and exercise as well as Lipitor and diabetes.  Patient states that he understands and would try lifestyle modifications and agreed for rechecking lipid profile in 6 months.        May 22:  Patient here for follow-up after a long hiatus.  Denies any chest pains at rest on exertion, orthopnea, PND.  Concerned about the condition of his heart because uncontrolled diabetes as well as dyslipidemia.  Still refusing to take statins.        Notes from May 2022:  Patient here for follow-up.  Asymptomatic.  Stress test abnormal.  EF 45%.  Stress EKG abnormal.     The ECG portion of this study is positive for myocardial ischemia.  The stress echo portion of this study is negative for myocardial ischemia.  Mildly decreased systolic function.  The estimated ejection fraction is 45%.  The patient's exercise capacity was normal.  The test was stopped because the patient experienced fatigue.  There were no arrhythmias during stress.        July 22: doing fine. No cp, orthopnea, pnd       The pre-procedure left  ventricular end diastolic pressure was 4.  The estimated blood loss was none.  There was non-obstructive coronary artery disease..     LEFT MAIN: NO SIGNIFICANT STENOSIS     LAD: PROX 10-20% STENOSIS     LCX: 10% STENOSIS     RCA: PROX 30%  STENOSIS     ACCESS: We accessed the right radial artery using ultrasound guidance. The vessel appeared widely patent, suitable for endovascular access. The images were interpreted by me and saved.  Access was closed with radial band.         September 2022: Patient here for follow-up.  Doing fine.  Denies any chest pains at rest on exertion, orthopnea, PND.  EF has normalized        The left ventricle is normal in size with mild concentric hypertrophy and normal systolic function.  The estimated ejection fraction is 55%.  Normal left ventricular diastolic function.  Normal right ventricular size with normal right ventricular systolic function.  Normal central venous pressure (3 mmHg).  The estimated PA systolic pressure is 17 mmHg.  There is no pulmonary hypertension.      December 24    History of Present Illness    CHIEF COMPLAINT:  - Jaswinder presents for follow-up to discuss medication management and overall cardiac health.    HPI:  Jaswinder reports non-adherence to prescribed medications for 1 month. He discontinued atorvastatin for 1.5 months due to running out of the medication, noting elevated cholesterol levels during this period. Losartan was also discontinued when the supply was exhausted. His last echo in 08/2022 showed improvement in heart function after starting medications, with EF normalizing from a previous low of 45%.    He denies chest pain, tightness, dyspnea, palpitations, or any side effects from medications.    MEDICATIONS:  - ASA  - Atorvastatin  - Losartan  -    LABS:  - Cholesterol: Elevated after patient discontinued medication for 1.5 months       Dec 24:  History of Present Illness    CHIEF COMPLAINT:  - Jaswinder presents for follow-up to discuss  medication management and overall cardiac health.    HPI:  Jaswinder reports non-adherence to prescribed medications for 1 month. He discontinued atorvastatin for 1.5 months due to running out of the medication, noting elevated cholesterol levels during this period. Losartan was also discontinued when the supply was exhausted. His last echo in 08/2022 showed improvement in heart function after starting medications, with EF normalizing from a previous low of 45%.    He denies chest pain, tightness, dyspnea, palpitations, or any side effects from medications.    MEDICATIONS:  - ASA  - Atorvastatin  - Losartan  - ASA  - Atorvastatin for cholesterol  - Losartan    LABS:  - Cholesterol: Elevated after patient discontinued medication for 1.5 months         Results for orders placed or performed in visit on 05/12/22   IN OFFICE EKG 12-LEAD (to happin!)    Collection Time: 05/12/22 11:39 AM    Narrative    Test Reason : E78.5,    Vent. Rate : 079 BPM     Atrial Rate : 079 BPM     P-R Int : 168 ms          QRS Dur : 086 ms      QT Int : 374 ms       P-R-T Axes : 074 046 018 degrees     QTc Int : 428 ms    Normal sinus rhythm  Normal ECG  When compared with ECG of 16-SEP-2019 15:53,  No significant change was found  Confirmed by Bro Souza MD [id number]) on 5/12/2022 4:45:37 PM    Referred By:  BELLA           Confirmed By:Bro Souza MD          ECHO    Results for orders placed during the hospital encounter of 09/01/22    Echo    Interpretation Summary  · The left ventricle is normal in size with mild concentric hypertrophy and normal systolic function.  · The estimated ejection fraction is 55%.  · Normal left ventricular diastolic function.  · Normal right ventricular size with normal right ventricular systolic function.  · Normal central venous pressure (3 mmHg).  · The estimated PA systolic pressure is 17 mmHg.  · There is no pulmonary hypertension.      STRESS TEST    No results found for this or any previous  visit.        CATH    Results for orders placed during the hospital encounter of 07/06/22    Cardiac catheterization    Conclusion  · The pre-procedure left ventricular end diastolic pressure was 4.  · The estimated blood loss was none.  · There was non-obstructive coronary artery disease..    LEFT MAIN: NO SIGNIFICANT STENOSIS    LAD: PROX 10-20% STENOSIS    LCX: 10% STENOSIS    RCA: PROX 30%  STENOSIS    ACCESS: We accessed the right radial artery using ultrasound guidance. The vessel appeared widely patent, suitable for endovascular access. The images were interpreted by me and saved.  Access was closed with radial band.              The procedure log was documented by Documenter: Rubina King RN and verified by Km Patel MD.    Date: 7/6/2022  Time: 8:06 AM      Jun 25:    History of Present Illness    CHIEF COMPLAINT:  Jaswinder presents today for follow up    CARDIOVASCULAR:  He denies chest pain, chest tightness, or dyspnea. Prior cardiac catheterization revealed no stenosis.    MEDICATIONS:  He continues aspirin, atorvastatin for cholesterol management, losartan, and Ozempic. He reports Ozempic is keeping his condition manageable though uncertain of its effectiveness.       Results for orders placed during the hospital encounter of 05/08/25    Echo    Interpretation Summary    Left Ventricle: The left ventricle is normal in size. Normal wall thickness. Unable to assess wall motion. There is mildly reduced systolic function with a visually estimated ejection fraction of 40 - 45%. Global longitudinal strain is reduced measuring -11.5%.  Consider repeating limited imaging with echo enhancing contrast for wall motion assessment. Grade I diastolic dysfunction.    Right Ventricle: The right ventricle is normal in size Systolic function is normal.    Aortic Valve: There is normal leaflet mobility. There is no stenosis. LVOT velocity time integral is 15.6 cm. Aortic valve peak velocity is 1.0 m/s. Mean gradient  is 2 mmHg.    Pulmonary Artery: Pulmonary artery pressure could not be estimated.      PAST MEDICAL HISTORY:     Past Medical History:   Diagnosis Date    Diabetes mellitus type 2 without retinopathy 6/30/2016    Diabetes mellitus, type 2     Diabetes type 2, uncontrolled 6/29/2016    Nuclear sclerosis of both eyes 6/30/2016       PAST SURGICAL HISTORY:     Past Surgical History:   Procedure Laterality Date    COLONOSCOPY N/A 5/16/2018    Procedure: COLONOSCOPY;  Surgeon: Jovany Mendiola MD;  Location: [facility] ENDO;  Service: Endoscopy;  Laterality: N/A;    LEFT HEART CATHETERIZATION Left 7/6/2022    Procedure: Left heart cath;  Surgeon: Km Patel MD;  Location: [facility] CATH LAB;  Service: Cardiology;  Laterality: Left;  RN PREOP ON 6/29/22.--NEED H/P           SOCIAL HISTORY:     Social History     Socioeconomic History    Marital status: Single   Tobacco Use    Smoking status: Never    Smokeless tobacco: Never   Substance and Sexual Activity    Alcohol use: Yes    Drug use: No    Sexual activity: Yes     Partners: Female     Social Drivers of Health     Financial Resource Strain: Medium Risk (5/1/2025)    Overall Financial Resource Strain (CARDIA)     Difficulty of Paying Living Expenses: Somewhat hard   Food Insecurity: No Food Insecurity (5/1/2025)    Hunger Vital Sign     Worried About Running Out of Food in the Last Year: Never true     Ran Out of Food in the Last Year: Never true   Transportation Needs: Unknown (5/1/2025)    PRAPARE - Transportation     Lack of Transportation (Non-Medical): No   Physical Activity: Sufficiently Active (5/1/2025)    Exercise Vital Sign     Days of Exercise per Week: 3 days     Minutes of Exercise per Session: 60 min   Recent Concern: Physical Activity - Inactive (2/10/2025)    Exercise Vital Sign     Days of Exercise per Week: 0 days     Minutes of Exercise per Session: 0 min   Stress: Stress Concern Present (5/1/2025)    [employer] [place] [employer] -  "Occupational Stress Questionnaire     Feeling of Stress : To some extent   Housing Stability: High Risk (5/1/2025)    Housing Stability Vital Sign     Unable to Pay for Housing in the Last Year: Yes     Number of Times Moved in the Last Year: 0     Homeless in the Last Year: No       FAMILY HISTORY:     Family History   Problem Relation Name Age of Onset    Cataracts Mother      Diabetes Mother      No Known Problems Father      No Known Problems Sister      No Known Problems Brother      No Known Problems Maternal Aunt      No Known Problems Maternal Uncle      No Known Problems Paternal Aunt      No Known Problems Paternal Uncle      No Known Problems Maternal Grandmother      No Known Problems Maternal Grandfather      No Known Problems Paternal Grandmother      No Known Problems Paternal Grandfather      Amblyopia Neg Hx      Blindness Neg Hx      Cancer Neg Hx      Hypertension Neg Hx      Macular degeneration Neg Hx      Retinal detachment Neg Hx      Strabismus Neg Hx      Stroke Neg Hx      Thyroid disease Neg Hx      Glaucoma Neg Hx         REVIEW OF SYSTEMS:   Review of Systems   Constitutional: Negative.   HENT: Negative.     Eyes: Negative.    Cardiovascular: Negative.    Respiratory: Negative.     Endocrine: Negative.    Hematologic/Lymphatic: Negative.    Skin: Negative.    Musculoskeletal: Negative.    Gastrointestinal: Negative.    Genitourinary: Negative.    Neurological: Negative.    Psychiatric/Behavioral: Negative.     Allergic/Immunologic: Negative.        A 10 point review of systems was performed and all the pertinent positives have been mentioned. Rest of review of systems was negative.        PHYSICAL EXAM:     Vitals:    06/16/25 0832   BP: 124/79   Pulse: 84   Resp: 18    Body mass index is 27.99 kg/m².  Weight: 88.5 kg (195 lb 1.7 oz)   Height: 5' 10" (177.8 cm)     Physical Exam  Vitals reviewed.   Constitutional:       Appearance: He is well-developed.   HENT:      Head: Normocephalic. "   Eyes:      Conjunctiva/sclera: Conjunctivae normal.      Pupils: Pupils are equal, round, and reactive to light.   Cardiovascular:      Rate and Rhythm: Normal rate and regular rhythm.      Heart sounds: Normal heart sounds.   Pulmonary:      Effort: Pulmonary effort is normal.      Breath sounds: Normal breath sounds.   Abdominal:      General: Bowel sounds are normal.      Palpations: Abdomen is soft.   Musculoskeletal:      Cervical back: Normal range of motion and neck supple.   Skin:     General: Skin is warm.   Neurological:      Mental Status: He is alert and oriented to person, place, and time.         Physical Exam              DATA:     Laboratory:  CBC:  Recent Labs   Lab 12/15/23  0747 06/06/24  0831 12/17/24  0925   WBC 6.20 5.78 4.30   Hemoglobin 14.9 14.1 14.8   Hematocrit 43.8 41.8 42.8   Platelets 171 163 166       CHEMISTRIES:  Recent Labs   Lab 06/24/22  1007 06/06/24  0831 12/17/24  0925 02/13/25  1400   Glucose 272 H 208 H 167 H 239 H   Sodium 137 140 137 137   Potassium 4.4 4.8 4.6 4.2   BUN 17 23 H 19 17   Creatinine 1.4 1.18 1.20 1.1   eGFR if  >60.0  --   --   --    eGFR if non  56.6 A  --   --   --    Calcium 9.5 8.8 9.1 9.4       CARDIAC BIOMARKERS:        COAGS:        LIPIDS/LFTS:  Recent Labs   Lab 12/15/23  0747 06/06/24  0831 12/17/24  0925 02/13/25  1400   Cholesterol 119 L 119 L 202 H  --    Triglycerides 74 81 115  --    HDL 46 47 50  --    LDL Cholesterol 58.2 L 55.8 L 129.0  --    Non-HDL Cholesterol 73 72 152  --    AST  --  29 36 30   ALT  --  56 H 53 H 54 H       Hemoglobin A1C   Date Value Ref Range Status   12/17/2024 7.6 (H) 4.0 - 5.6 % Final     Comment:     ADA Screening Guidelines:  5.7-6.4%  Consistent with prediabetes  >or=6.5%  Consistent with diabetes    High levels of fetal hemoglobin interfere with the HbA1C  assay. Heterozygous hemoglobin variants (HbS, HgC, etc)do  not significantly interfere with this assay.   However, presence  "of multiple variants may affect accuracy.     06/06/2024 8.2 (H) 4.0 - 5.6 % Final     Comment:     ADA Screening Guidelines:  5.7-6.4%  Consistent with prediabetes  >or=6.5%  Consistent with diabetes    High levels of fetal hemoglobin interfere with the HbA1C  assay. Heterozygous hemoglobin variants (HbS, HgC, etc)do  not significantly interfere with this assay.   However, presence of multiple variants may affect accuracy.     12/15/2023 8.3 (H) 4.0 - 5.6 % Final     Comment:     ADA Screening Guidelines:  5.7-6.4%  Consistent with prediabetes  >or=6.5%  Consistent with diabetes    High levels of fetal hemoglobin interfere with the HbA1C  assay. Heterozygous hemoglobin variants (HbS, HgC, etc)do  not significantly interfere with this assay.   However, presence of multiple variants may affect accuracy.     02/13/2020 8.6 (H) 4.8 - 5.6 % Final     Comment:     [facility]       TSH  Recent Labs   Lab 12/15/23  0747 06/06/24  0831 12/17/24  0925   TSH 1.050 1.870 1.350       The 10-year ASCVD risk score (Carlos RODRIGUEZ, et al., 2019) is: 12.7%    Values used to calculate the score:      Age: 55 years      Sex: Male      Is Non- : Yes      Diabetic: Yes      Tobacco smoker: No      Systolic Blood Pressure: 124 mmHg      Is BP treated: No      HDL Cholesterol: 50 mg/dL      Total Cholesterol: 202 mg/dL       BNP    No results found for: "BNP"      ASSESSMENT AND PLAN     Patient Active Problem List   Diagnosis    Diabetes mellitus type 2 without retinopathy    Nuclear sclerosis, bilateral    Refractive error    Type 2 diabetes mellitus with hyperglycemia, without long-term current use of insulin    Erectile dysfunction    BPH with urinary obstruction    Adhesive capsulitis of both shoulders    Coronary artery disease    Congestive heart failure           ALLERGIES AND MEDICATION:     Review of patient's allergies indicates:   Allergen Reactions    No known drug allergies         Medication List            " Accurate as of June 16, 2025  9:04 AM. If you have any questions, ask your nurse or doctor.                CONTINUE taking these medications      aspirin 81 MG Chew  CHEW AND SWALLOW 1 TABLET BY MOUTH ONCE DAILY     atorvastatin 40 MG tablet  Commonly known as: LIPITOR  Take 1 tablet (40 mg total) by mouth every evening.     glipiZIDE 5 MG Tr24  Take 1 tablet (5 mg total) by mouth daily with breakfast.     losartan 25 MG tablet  Commonly known as: COZAAR  Take 1 tablet (25 mg total) by mouth once daily.     mirtazapine 7.5 MG Tab  Commonly known as: REMERON  Take 1 tablet (7.5 mg total) by mouth every evening.     OZEMPIC 1 mg/dose (4 mg/3 mL)  Generic drug: semaglutide  INJECT 1MG SUBCUTANEOUSLY  EVERY 7 DAYS     prazosin 1 MG Cap  Commonly known as: MINIPRESS  Take 1 capsule (1 mg total) by mouth every evening.     vilazodone 10 mg Tab tablet  Commonly known as: VIIBRYD  Take 1 tablet (10 mg total) by mouth once daily.              No orders of the defined types were placed in this encounter.      Assessment & Plan        1.  Cardiovascular risk optimization.  On Lipitor     2. Cardiomyopathy with EF of 45% and abnormal stress EKG.  Stress echo portion was normal.  Because of EF of 45% coronary angiogram was performed.  Subsequent angiogram did not reveal any significant coronary artery stenosis.  Mild nonobstructive coronary artery disease was seen.  On losartan.  Add metoprolol.  Follow-up with echocardiogram in 6 months.    3. Dyslipidemia: On Lipitor.      Thank you very much for involving me in the care of your patient.  Please do not hesitate to contact me if there are any questions.      Km Patel MD, FAC, [facility]  Interventional Cardiologist, [facility].       Visit today included increased complexity associated with the care of the episodic problem dyslipidemia, nonobstructive CAD, cardiomyopathy with heart failure with mildly reduced ejection fraction addressed and managing the longitudinal  care of the patient due to the serious and/or complex managed problem(s) Problem List[1]  .    This note was dictated with the help of speech recognition software.  There might be un-intended errors and/or substitutions.    This note was generated with the assistance of ambient listening technology. Verbal consent was obtained by the patient and accompanying visitor(s) for the recording of patient appointment to facilitate this note. I attest to having reviewed and edited the generated note for accuracy, though some syntax or spelling errors may persist. Please contact the author of this note for any clarification.                    [1]   Patient Active Problem List  Diagnosis    Diabetes mellitus type 2 without retinopathy    Nuclear sclerosis, bilateral    Refractive error    Type 2 diabetes mellitus with hyperglycemia, without long-term current use of insulin    Erectile dysfunction    BPH with urinary obstruction    Adhesive capsulitis of both shoulders    Coronary artery disease    Congestive heart failure

## 2025-06-17 LAB
OHS QRS DURATION: 78 MS
OHS QTC CALCULATION: 428 MS

## 2025-06-20 ENCOUNTER — OFFICE VISIT (OUTPATIENT)
Dept: FAMILY MEDICINE | Facility: CLINIC | Age: 55
End: 2025-06-20
Payer: COMMERCIAL

## 2025-06-20 ENCOUNTER — TELEPHONE (OUTPATIENT)
Dept: FAMILY MEDICINE | Facility: CLINIC | Age: 55
End: 2025-06-20
Payer: COMMERCIAL

## 2025-06-20 VITALS
HEIGHT: 70 IN | SYSTOLIC BLOOD PRESSURE: 126 MMHG | TEMPERATURE: 98 F | HEART RATE: 70 BPM | DIASTOLIC BLOOD PRESSURE: 72 MMHG | OXYGEN SATURATION: 99 % | WEIGHT: 192.25 LBS | BODY MASS INDEX: 27.52 KG/M2

## 2025-06-20 DIAGNOSIS — E11.65 TYPE 2 DIABETES MELLITUS WITH HYPERGLYCEMIA, WITHOUT LONG-TERM CURRENT USE OF INSULIN: ICD-10-CM

## 2025-06-20 DIAGNOSIS — G43.919 INTRACTABLE MIGRAINE WITHOUT STATUS MIGRAINOSUS, UNSPECIFIED MIGRAINE TYPE: Primary | ICD-10-CM

## 2025-06-20 DIAGNOSIS — F40.240 CLAUSTROPHOBIA: ICD-10-CM

## 2025-06-20 PROCEDURE — 99999 PR PBB SHADOW E&M-EST. PATIENT-LVL V: CPT | Mod: PBBFAC,,,

## 2025-06-20 RX ORDER — LORAZEPAM 0.5 MG/1
0.5 TABLET ORAL EVERY 6 HOURS PRN
Qty: 2 TABLET | Refills: 0 | Status: SHIPPED | OUTPATIENT
Start: 2025-06-20 | End: 2025-07-20

## 2025-06-20 RX ORDER — SUMATRIPTAN SUCCINATE 50 MG/1
50 TABLET ORAL
Qty: 10 TABLET | Refills: 0 | Status: SHIPPED | OUTPATIENT
Start: 2025-06-20 | End: 2025-07-20

## 2025-06-20 NOTE — TELEPHONE ENCOUNTER
Copied from CRM #[id number]. Topic: General Inquiry - Patient Advice  >> Jun 20, 2025  9:07 AM Vega wrote:  [facility]      What is the request in detail:[facility] is calling in regards of they need to verify that the medication came from your office the lorazepam is out of the area for them they would like a call back      Can the clinic reply by [facility]?     Would the patient rather a call back or a response via [facility]?callback      Best call back number:[phone] [facility]      Additional Information:

## 2025-06-20 NOTE — TELEPHONE ENCOUNTER
Spoke with pharmacist Aurelia. Aurelia was advised that prescription did come from our office. Pharmacist verbalized understanding and does not have any other questions or concerns at this time

## 2025-06-20 NOTE — PROGRESS NOTES
HPI     Chief Complaint:  Chief Complaint   Patient presents with    Headache       Jaswinder Valle is a 55 y.o. male with multiple medical diagnoses as listed in the medical history and problem list that presents for   Chief Complaint   Patient presents with    Headache    .     Patient is not known to me with his last appointment in this department on 2/13/2025.     Pt presents for headaches.  Headache   This is a chronic problem. The current episode started more than 1 year ago. Episode frequency: about 3 times per week. The pain is located in the Frontal and bilateral region. The quality of the pain is described as throbbing. The pain is at a severity of 9/10. Associated symptoms include blurred vision, photophobia, tinnitus and vomiting. Treatments tried: excedrin, ibu, tylenol. The treatment provided no relief. His past medical history is significant for migraines in the family.   Feels headaches are worsening.  Sleeps about 2-3 hours per night.  Works for [employer] which doesn't cause too much stress.  Last headache was about two days.       Assessment & Plan     Problem List Items Addressed This Visit       Type 2 diabetes mellitus with hyperglycemia, without long-term current use of insulin  A1C above goal. Will order A1C due today.  Hemoglobin A1C   Date Value Ref Range Status   12/17/2024 7.6 (H) 4.0 - 5.6 % Final     Comment:     ADA Screening Guidelines:  5.7-6.4%  Consistent with prediabetes  >or=6.5%  Consistent with diabetes    High levels of fetal hemoglobin interfere with the HbA1C  assay. Heterozygous hemoglobin variants (HbS, HgC, etc)do  not significantly interfere with this assay.   However, presence of multiple variants may affect accuracy.     06/06/2024 8.2 (H) 4.0 - 5.6 % Final     Comment:     ADA Screening Guidelines:  5.7-6.4%  Consistent with prediabetes  >or=6.5%  Consistent with diabetes    High levels of fetal hemoglobin interfere with the HbA1C  assay. Heterozygous hemoglobin  variants (HbS, HgC, etc)do  not significantly interfere with this assay.   However, presence of multiple variants may affect accuracy.     12/15/2023 8.3 (H) 4.0 - 5.6 % Final     Comment:     ADA Screening Guidelines:  5.7-6.4%  Consistent with prediabetes  >or=6.5%  Consistent with diabetes    High levels of fetal hemoglobin interfere with the HbA1C  assay. Heterozygous hemoglobin variants (HbS, HgC, etc)do  not significantly interfere with this assay.   However, presence of multiple variants may affect accuracy.     02/13/2020 8.6 (H) 4.8 - 5.6 % Final     Comment:     [facility]         Overview   Metformin caused renal insufficiency. Cannot tolerate this medication due to increased creatinine          Relevant Orders    HEMOGLOBIN A1C     Other Visit Diagnoses         Intractable migraine without status migrainosus, unspecified migraine type    -  Primary  Worsening headaches over the past year. Has vomiting, blurry vision and photophobia with headaches. Will treat for migraine with imitrex. Will order head CT and refer to neurology.    Relevant Medications    sumatriptan (IMITREX) 50 MG tablet    Other Relevant Orders    CT Head Without Contrast    Ambulatory referral/consult to Neurology      Claustrophobia      Upcoming CT for headaches. Concern for claustrophobia, will order lorazepam for CT.    Relevant Medications    LORazepam (ATIVAN) 0.5 MG tablet              --------------------------------------------      Health Maintenance:  Health Maintenance         Date Due Completion Date    Foot Exam 07/17/2024 7/17/2023 (Done)    Override on 7/17/2023: Done    COVID-19 Vaccine (4 - 2024-25 season) 09/01/2024 12/24/2021    Hemoglobin A1c 06/17/2025 12/17/2024    Influenza Vaccine (Season Ended) 09/01/2025 12/20/2023    Lipid Panel 12/17/2025 12/17/2024    Diabetic Eye Exam 12/18/2025 12/18/2024    Diabetes Urine Screening 12/30/2025 12/30/2024    Aspirin/Antiplatelet Therapy 06/20/2026 6/20/2025    Colorectal  "Cancer Screening 05/16/2028 5/16/2018    TETANUS VACCINE 09/05/2029 9/5/2019    RSV Vaccine (Age 60+ and Pregnant patients) (1 - 1-dose 75+ series) 02/23/2043 ---            Health maintenance reviewed and A1c ordered    Follow Up:  Follow up in about 3 months (around 9/20/2025).    Exam     Review of Systems:  (as noted above)  Review of Systems   HENT:  Positive for tinnitus.    Eyes:  Positive for blurred vision and photophobia.   Gastrointestinal:  Positive for vomiting.   Neurological:  Positive for headaches.       Physical Exam:   Physical Exam  Constitutional:       General: He is not in acute distress.     Appearance: Normal appearance. He is not ill-appearing, toxic-appearing or diaphoretic.   Cardiovascular:      Rate and Rhythm: Normal rate.   Pulmonary:      Effort: Pulmonary effort is normal.   Neurological:      Mental Status: He is alert.       Vitals:    06/20/25 0829   BP: 126/72   BP Location: Right arm   Patient Position: Sitting   Pulse: 70   Temp: 97.8 °F (36.6 °C)   TempSrc: Oral   SpO2: 99%   Weight: 87.2 kg (192 lb 3.9 oz)   Height: 5' 10" (1.778 m)      Body mass index is 27.58 kg/m².        History     Past Medical History:  Past Medical History:   Diagnosis Date    Diabetes mellitus type 2 without retinopathy 6/30/2016    Diabetes mellitus, type 2     Diabetes type 2, uncontrolled 6/29/2016    Nuclear sclerosis of both eyes 6/30/2016       Past Surgical History:  Past Surgical History:   Procedure Laterality Date    COLONOSCOPY N/A 5/16/2018    Procedure: COLONOSCOPY;  Surgeon: Jovany Mendiola MD;  Location: [facility] ENDO;  Service: Endoscopy;  Laterality: N/A;    LEFT HEART CATHETERIZATION Left 7/6/2022    Procedure: Left heart cath;  Surgeon: Km Patel MD;  Location: [facility] CATH LAB;  Service: Cardiology;  Laterality: Left;  RN PREOP ON 6/29/22.--NEED H/P       Social History:  Social History[1]    Family History:  Family History   Problem Relation Name Age of Onset    Cataracts " Mother      Diabetes Mother      No Known Problems Father      No Known Problems Sister      No Known Problems Brother      No Known Problems Maternal Aunt      No Known Problems Maternal Uncle      No Known Problems Paternal Aunt      No Known Problems Paternal Uncle      No Known Problems Maternal Grandmother      No Known Problems Maternal Grandfather      No Known Problems Paternal Grandmother      No Known Problems Paternal Grandfather      Amblyopia Neg Hx      Blindness Neg Hx      Cancer Neg Hx      Hypertension Neg Hx      Macular degeneration Neg Hx      Retinal detachment Neg Hx      Strabismus Neg Hx      Stroke Neg Hx      Thyroid disease Neg Hx      Glaucoma Neg Hx         Allergies and Medications: (updated and reviewed)  Review of patient's allergies indicates:   Allergen Reactions    No known drug allergies      Current Medications[2]    Patient Care Team:  Mary Braden MD as PCP - General (Family Medicine)  Patito Quiñones LPN as Care Coordinator         - The patient is given an After Visit Summary that lists all medications with directions, allergies, education, orders placed during this encounter and follow-up instructions.      - I have reviewed the patient's medical information including past medical, family, and social history sections including the medications and allergies.      - We discussed the patient's current medications.     This note was created by combination of typed  and MModal dictation.  Transcription errors may be present.  If there are any questions, please contact me.       Gina Diaz NP                      [1]   Social History  Socioeconomic History    Marital status: Single   Tobacco Use    Smoking status: Never    Smokeless tobacco: Never   Substance and Sexual Activity    Alcohol use: Yes    Drug use: No    Sexual activity: Yes     Partners: Female     Social Drivers of Health     Financial Resource Strain: Medium Risk (5/1/2025)    Overall  Financial Resource Strain (CARDIA)     Difficulty of Paying Living Expenses: Somewhat hard   Food Insecurity: No Food Insecurity (5/1/2025)    Hunger Vital Sign     Worried About Running Out of Food in the Last Year: Never true     Ran Out of Food in the Last Year: Never true   Transportation Needs: Unknown (5/1/2025)    PRAPARE - Transportation     Lack of Transportation (Non-Medical): No   Physical Activity: Sufficiently Active (5/1/2025)    Exercise Vital Sign     Days of Exercise per Week: 3 days     Minutes of Exercise per Session: 60 min   Recent Concern: Physical Activity - Inactive (2/10/2025)    Exercise Vital Sign     Days of Exercise per Week: 0 days     Minutes of Exercise per Session: 0 min   Stress: Stress Concern Present (5/1/2025)    [employer] [place] [employer] - Occupational Stress Questionnaire     Feeling of Stress : To some extent   Housing Stability: High Risk (5/1/2025)    Housing Stability Vital Sign     Unable to Pay for Housing in the Last Year: Yes     Number of Times Moved in the Last Year: 0     Homeless in the Last Year: No   [2]   Current Outpatient Medications   Medication Sig Dispense Refill    aspirin 81 MG Chew CHEW AND SWALLOW 1 TABLET BY MOUTH ONCE DAILY 30 tablet 0    atorvastatin (LIPITOR) 40 MG tablet Take 1 tablet (40 mg total) by mouth every evening. 90 tablet 3    losartan (COZAAR) 25 MG tablet Take 1 tablet (25 mg total) by mouth once daily. 90 tablet 3    metoprolol succinate (TOPROL-XL) 25 MG 24 hr tablet Take 1 tablet (25 mg total) by mouth once daily. 90 tablet 3    mirtazapine (REMERON) 7.5 MG Tab Take 1 tablet (7.5 mg total) by mouth every evening. 90 tablet 0    OZEMPIC 1 mg/dose (4 mg/3 mL) INJECT 1MG SUBCUTANEOUSLY  EVERY 7 DAYS 9 mL 1    prazosin (MINIPRESS) 1 MG Cap Take 1 capsule (1 mg total) by mouth every evening. 90 capsule 0    glipiZIDE 5 MG TR24 Take 1 tablet (5 mg total) by mouth daily with breakfast. 90 tablet 3    LORazepam (ATIVAN) 0.5 MG  tablet Take 1 tablet (0.5 mg total) by mouth every 6 (six) hours as needed for Anxiety. 2 tablet 0    sumatriptan (IMITREX) 50 MG tablet Take 1 tablet (50 mg total) by mouth every 2 (two) hours as needed for Migraine (If symptoms persist or return, may repeat dose (usually same as first dose) after 2 hours. Maximum dose: 100 mg/dose; 200 mg per 24 hours.). 10 tablet 0    vilazodone (VIIBRYD) 10 mg Tab tablet Take 1 tablet (10 mg total) by mouth once daily. 30 tablet 1     No current facility-administered medications for this visit.

## 2025-06-24 NOTE — PROGRESS NOTES
[facility]  Clinic Note    Date of Service: 6/24/2025  Patient seen at the request of: Gina Diaz NP    Reason for Consultation  migraine    Assessment:  Jaswinder Valle is a 55 y.o. male who presents with             Plan:    - ***    - RTC in ***    *** our practice    A dictation device was used to produce this document. Use of such devices sometimes results in grammatical errors or replacement of words that sound similarly.     Signed:    Ferny Schultz MD  Neurology/Epilepsy  06/24/2025 2:00 PM      HPI:  Jaswinder Valle is a 55 y.o. male with   Past Medical History:   Diagnosis Date    Diabetes mellitus type 2 without retinopathy 6/30/2016    Diabetes mellitus, type 2     Diabetes type 2, uncontrolled 6/29/2016    Nuclear sclerosis of both eyes 6/30/2016           This is the extent of the patient's complaints at this time.    Per family medicine note 6/2025:  Headache   This is a chronic problem. The current episode started more than 1 year ago. Episode frequency: about 3 times per week. The pain is located in the Frontal and bilateral region. The quality of the pain is described as throbbing. The pain is at a severity of 9/10. Associated symptoms include blurred vision, photophobia, tinnitus and vomiting. Treatments tried: excedrin, ibu, tylenol. The treatment provided no relief. His past medical history is significant for migraines in the family.   Feels headaches are worsening.  Sleeps about 2-3 hours per night.  Works for [employer] which doesn't cause too much stress.  Last headache was about two days.     Worsening headaches over the past year. Has vomiting, blurry vision and photophobia with headaches. Will treat for migraine with imitrex. Will order head CT and refer to neurology.       TSH   Date Value Ref Range Status   12/17/2024 1.350 0.400 - 4.000 uIU/mL Final     Comment:     Warning:  Heterophilic antibodies in serum or plasma of   certain individuals are known to cause  interference with   immunoassays. These antibodies may be present in blood samples   from individuals regularly exposed to animal or who have been   treated with animal products.     Patients taking high doses of supplemental biotin may have  negatively biased results.      06/06/2024 1.870 0.400 - 4.000 uIU/mL Final     Comment:     Warning:  Heterophilic antibodies in serum or plasma of   certain individuals are known to cause interference with   immunoassays. These antibodies may be present in blood samples   from individuals regularly exposed to animal or who have been   treated with animal products.     Patients taking high doses of supplemental biotin may have  negatively biased results.        Hemoglobin A1C   Date Value Ref Range Status   12/17/2024 7.6 (H) 4.0 - 5.6 % Final     Comment:     ADA Screening Guidelines:  5.7-6.4%  Consistent with prediabetes  >or=6.5%  Consistent with diabetes    High levels of fetal hemoglobin interfere with the HbA1C  assay. Heterozygous hemoglobin variants (HbS, HgC, etc)do  not significantly interfere with this assay.   However, presence of multiple variants may affect accuracy.     06/06/2024 8.2 (H) 4.0 - 5.6 % Final     Comment:     ADA Screening Guidelines:  5.7-6.4%  Consistent with prediabetes  >or=6.5%  Consistent with diabetes    High levels of fetal hemoglobin interfere with the HbA1C  assay. Heterozygous hemoglobin variants (HbS, HgC, etc)do  not significantly interfere with this assay.   However, presence of multiple variants may affect accuracy.     02/13/2020 8.6 (H) 4.8 - 5.6 % Final     Comment:     [facility]         Review of Systems:  ROS negative unless noted in HPI    Past Surgical History:  Past Surgical History:   Procedure Laterality Date    COLONOSCOPY N/A 5/16/2018    Procedure: COLONOSCOPY;  Surgeon: Jovany Mendiola MD;  Location: [facility];  Service: Endoscopy;  Laterality: N/A;    LEFT HEART CATHETERIZATION Left 7/6/2022    Procedure: Left heart  cath;  Surgeon: Km Patel MD;  Location: [facility] CATH LAB;  Service: Cardiology;  Laterality: Left;  RN PREOP ON 6/29/22.--NEED H/P       Family History:  Family History   Problem Relation Name Age of Onset    Cataracts Mother      Diabetes Mother      No Known Problems Father      No Known Problems Sister      No Known Problems Brother      No Known Problems Maternal Aunt      No Known Problems Maternal Uncle      No Known Problems Paternal Aunt      No Known Problems Paternal Uncle      No Known Problems Maternal Grandmother      No Known Problems Maternal Grandfather      No Known Problems Paternal Grandmother      No Known Problems Paternal Grandfather      Amblyopia Neg Hx      Blindness Neg Hx      Cancer Neg Hx      Hypertension Neg Hx      Macular degeneration Neg Hx      Retinal detachment Neg Hx      Strabismus Neg Hx      Stroke Neg Hx      Thyroid disease Neg Hx      Glaucoma Neg Hx         Social History:  Social History[1]    Allergies:  No known drug allergies    Outpatient Medications:  Prior to Admission medications    Medication Sig Start Date End Date Taking? Authorizing Provider   aspirin 81 MG Chew CHEW AND SWALLOW 1 TABLET BY MOUTH ONCE DAILY 8/21/24   Km Patel MD   atorvastatin (LIPITOR) 40 MG tablet Take 1 tablet (40 mg total) by mouth every evening. 12/19/24   Km Patel MD   glipiZIDE 5 MG TR24 Take 1 tablet (5 mg total) by mouth daily with breakfast. 6/6/24 6/6/25  Mary Braden MD   LORazepam (ATIVAN) 0.5 MG tablet Take 1 tablet (0.5 mg total) by mouth every 6 (six) hours as needed for Anxiety. 6/20/25 7/20/25  Gina Diaz NP   losartan (COZAAR) 25 MG tablet Take 1 tablet (25 mg total) by mouth once daily. 12/19/24   Km Patel MD   metoprolol succinate (TOPROL-XL) 25 MG 24 hr tablet Take 1 tablet (25 mg total) by mouth once daily. 6/16/25   Km Patel MD   mirtazapine (REMERON) 7.5 MG Tab Take 1 tablet (7.5 mg total) by mouth every evening. 4/2/25  7/1/25  Yash Blanchard PA-C   OZEMPIC 1 mg/dose (4 mg/3 mL) INJECT 1MG SUBCUTANEOUSLY  EVERY 7 DAYS 1/24/25   Mary Braden MD   prazosin (MINIPRESS) 1 MG Cap Take 1 capsule (1 mg total) by mouth every evening. 4/2/25 7/1/25  Yash Blanchard PA-C   sumatriptan (IMITREX) 50 MG tablet Take 1 tablet (50 mg total) by mouth every 2 (two) hours as needed for Migraine (If symptoms persist or return, may repeat dose (usually same as first dose) after 2 hours. Maximum dose: 100 mg/dose; 200 mg per 24 hours.). 6/20/25 7/20/25  Gina Diaz NP   vilazodone (VIIBRYD) 10 mg Tab tablet Take 1 tablet (10 mg total) by mouth once daily. 4/14/25 6/13/25  Yash Blanchard PA-C       Physical exam:    Vitals: There were no vitals taken for this visit.    General:   Sitting in chair, in no distress, well-nourished, well-developed, appears stated age.  Head/Neck:   Normocephalic,atraumatic  Pulm:  Non-labored breathing     Mental Status: Alert and oriented to person, time, place, situation. Speech spontaneous and fluent without paraphasias; no dysarthria  Fundoscopy:  ***Optic nerves: Clear margins bilaterally on funduscopic exam.  CN:  II: visual fields full  III, IV, VI: EOM intact without nystagmus or diplopia.   V: Sensation to light touch full and symmetric in V1-3. [place] contraction full bilaterally.   VII: Facial movement full and symmetric.   VIII: Hearing grossly normal to conversation.  IX, X: Palate midline with symmetric elevation.    XI: SCM and trapezius: 5/5 bilaterally.   XII: Tongue midline without fasciculations.  Motor: Normal bulk and tone throughout all four extremities.   LUE: D: 5/5; B: 5/5; T:  5/5; WF: 5/5; WE:  5/5; IO: 5/5   RUE: D: 5/5; B: 5/5; T:  5/5; WF:5/5; WE:  5/5; IO: 5/5   LLE: HF: 5/5, KE: 5/5, KF: 5/5, DF: 5/5, PF: 5/5  RLE: HF: 5/5, KE: 5/5, KF: 5/5, DF: 5/5, PF: 5/5  No tremors   Sensory: Intact and symmetric to light touch throughout.  Reflexes: LUE: Biceps 2+  brachioradialis 2+  RUE: Biceps 2+, brachioradialis 2+  LLE: Knee 2+, ankle 2+  RLE: Knee 2+, ankle 2+  Coordination:  Intact and symmetric to finger-to-nose and heel-to-shin.  Gait:  Intact to casual gait.    Imaging:  All pertinent imaging was personally reviewed.    On my personal review:   ***        I spent a total of *** minutes on the day of the visit. This includes face to face time and non-face to face time preparing to see the patient (eg, review of tests), obtaining and/or reviewing separately obtained history, documenting clinical information in the electronic or other health record, independently interpreting results and communicating results to the patient/family/caregiver, or care coordinator.         [1]   Social History  Tobacco Use    Smoking status: Never    Smokeless tobacco: Never   Substance Use Topics    Alcohol use: Yes    Drug use: No

## 2025-06-25 DIAGNOSIS — E11.65 TYPE 2 DIABETES MELLITUS WITH HYPERGLYCEMIA, WITHOUT LONG-TERM CURRENT USE OF INSULIN: ICD-10-CM

## 2025-06-26 RX ORDER — GLIPIZIDE 5 MG/1
5 TABLET, FILM COATED, EXTENDED RELEASE ORAL
Qty: 90 TABLET | Refills: 0 | OUTPATIENT
Start: 2025-06-26

## 2025-06-26 NOTE — TELEPHONE ENCOUNTER
Care Due:                  Date            Visit Type   Department     Provider  --------------------------------------------------------------------------------                                 -         Merged with [facility] FAMILY MED                              PRIMARY      / INTERNAL MED  Last Visit: 06-      CARE ([facility])   / [facility]         Mary Houston                              [facility] FAMILY MED                              PRIMARY      / INTERNAL MED  Next Visit: 09-      CARE ([facility])   / PEDS         Mary Houston                                                            Last  Test          Frequency    Reason                     Performed    Due Date  --------------------------------------------------------------------------------    HBA1C.......  6 months...  OZEMPIC, glipiZIDE.......  12-   06-    [facility] Embedded Care Due Messages. Reference number: [id number].   6/25/2025 11:58:45 PM CDT

## 2025-06-26 NOTE — TELEPHONE ENCOUNTER
Refill Routing Note   Medication(s) are not appropriate for processing by [facility] for the following reason(s):        Required labs outdated  No active prescription written by provider    [facility] action(s):  Defer   Requires labs : Yes             Appointments  past 12m or future 3m with PCP    Date Provider   Last Visit   6/17/2024 Mary Braden MD   Next Visit   9/22/2025 Mary Braden MD   ED visits in past 90 days: 0        Note composed:12:04 AM 06/26/2025

## 2025-06-27 ENCOUNTER — OFFICE VISIT (OUTPATIENT)
Dept: NEUROLOGY | Facility: CLINIC | Age: 55
End: 2025-06-27
Payer: COMMERCIAL

## 2025-06-27 VITALS
WEIGHT: 191.81 LBS | HEART RATE: 81 BPM | DIASTOLIC BLOOD PRESSURE: 82 MMHG | BODY MASS INDEX: 27.46 KG/M2 | SYSTOLIC BLOOD PRESSURE: 129 MMHG | HEIGHT: 70 IN

## 2025-06-27 DIAGNOSIS — G43.101 MIGRAINE WITH AURA AND WITH STATUS MIGRAINOSUS, NOT INTRACTABLE: Primary | ICD-10-CM

## 2025-06-27 DIAGNOSIS — G43.919 INTRACTABLE MIGRAINE WITHOUT STATUS MIGRAINOSUS, UNSPECIFIED MIGRAINE TYPE: ICD-10-CM

## 2025-06-27 PROCEDURE — 99999 PR PBB SHADOW E&M-EST. PATIENT-LVL IV: CPT | Mod: PBBFAC,,, | Performed by: STUDENT IN AN ORGANIZED HEALTH CARE EDUCATION/TRAINING PROGRAM

## 2025-06-27 RX ORDER — RIZATRIPTAN BENZOATE 5 MG/1
5 TABLET, ORALLY DISINTEGRATING ORAL
Qty: 20 TABLET | Refills: 2 | Status: SHIPPED | OUTPATIENT
Start: 2025-06-27 | End: 2025-07-27

## 2025-06-27 RX ORDER — AMITRIPTYLINE HYDROCHLORIDE 10 MG/1
10 TABLET, FILM COATED ORAL NIGHTLY
Qty: 30 TABLET | Refills: 11 | Status: SHIPPED | OUTPATIENT
Start: 2025-06-27 | End: 2026-06-27

## 2025-06-27 NOTE — PROGRESS NOTES
[facility]  Clinic Note    Date of Service: 6/27/2025  Patient seen at the request of: Gina Diaz NP    Reason for Consultation  migraine    Assessment:  Jaswinder Valle is a 55 y.o. male who presents with migraine.    Patient presents with a one year history of migraines.  He reports that he gets an average of three migraines per week with a visual aura.  His migraines start with unilateral blurriness of vision, usually in the left visual field.  This is followed by pain usually behind the left eye.  Symptoms are associated with nausea, photophobia, and phonophobia.  Patient reports he has had to call in from work due to his migraines.    Patient has a family history of migraines in his mother.  Patient also has a tinnitus associated with his service in field [employer].    Prior:  Sumatriptan - not effective  Beta blocker - not effective      Plan:    - rizatriptan 5mg as needed  - amitriptyline 10mg nightly      Transfer to more permanent neurologist    A dictation device was used to produce this document. Use of such devices sometimes results in grammatical errors or replacement of words that sound similarly.     Signed:    Ferny Schultz MD  Neurology/Epilepsy  06/27/2025 8:21 AM      HPI:  Jaswinder Valle is a 55 y.o. male with   Past Medical History:   Diagnosis Date    Diabetes mellitus type 2 without retinopathy 6/30/2016    Diabetes mellitus, type 2     Diabetes type 2, uncontrolled 6/29/2016    Nuclear sclerosis of both eyes 6/30/2016     Patient reports onset of headaches about a year ago.  Headaches associated with photophobia and phonophobia.  Associated with nausea.  Aura of blurriness, mostly on the left side, sometimes in the right visual field.  Migraines are occurring 3x per week and patient will have to sleep them off.  They sometimes persist in the AM.  Migraines are mostly behind the left eye.      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  Patient's  mother has migraines.   Patient reports tightness around the neck.      Patient has tried Excedrin, tylenol, ibuprofen.  Patient tried sumatriptan with limited.        This is the extent of the patient's complaints at this time.      TSH   Date Value Ref Range Status   12/17/2024 1.350 0.400 - 4.000 uIU/mL Final     Comment:     Warning:  Heterophilic antibodies in serum or plasma of   certain individuals are known to cause interference with   immunoassays. These antibodies may be present in blood samples   from individuals regularly exposed to animal or who have been   treated with animal products.     Patients taking high doses of supplemental biotin may have  negatively biased results.      06/06/2024 1.870 0.400 - 4.000 uIU/mL Final     Comment:     Warning:  Heterophilic antibodies in serum or plasma of   certain individuals are known to cause interference with   immunoassays. These antibodies may be present in blood samples   from individuals regularly exposed to animal or who have been   treated with animal products.     Patients taking high doses of supplemental biotin may have  negatively biased results.        Hemoglobin A1C   Date Value Ref Range Status   12/17/2024 7.6 (H) 4.0 - 5.6 % Final     Comment:     ADA Screening Guidelines:  5.7-6.4%  Consistent with prediabetes  >or=6.5%  Consistent with diabetes    High levels of fetal hemoglobin interfere with the HbA1C  assay. Heterozygous hemoglobin variants (HbS, HgC, etc)do  not significantly interfere with this assay.   However, presence of multiple variants may affect accuracy.     06/06/2024 8.2 (H) 4.0 - 5.6 % Final     Comment:     ADA Screening Guidelines:  5.7-6.4%  Consistent with prediabetes  >or=6.5%  Consistent with diabetes    High levels of fetal hemoglobin interfere with the HbA1C  assay. Heterozygous hemoglobin variants (HbS, HgC, etc)do  not significantly interfere with this assay.   However, presence of multiple variants may affect  accuracy.     02/13/2020 8.6 (H) 4.8 - 5.6 % Final     Comment:     [facility]         Review of Systems:  ROS negative unless noted in HPI    Past Surgical History:  Past Surgical History:   Procedure Laterality Date    COLONOSCOPY N/A 5/16/2018    Procedure: COLONOSCOPY;  Surgeon: Jovany Mendiola MD;  Location: [facility] ENDO;  Service: Endoscopy;  Laterality: N/A;    LEFT HEART CATHETERIZATION Left 7/6/2022    Procedure: Left heart cath;  Surgeon: Km Patel MD;  Location: [facility] CATH LAB;  Service: Cardiology;  Laterality: Left;  RN PREOP ON 6/29/22.--NEED H/P       Family History:  Family History   Problem Relation Name Age of Onset    Cataracts Mother      Diabetes Mother      No Known Problems Father      No Known Problems Sister      No Known Problems Brother      No Known Problems Maternal Aunt      No Known Problems Maternal Uncle      No Known Problems Paternal Aunt      No Known Problems Paternal Uncle      No Known Problems Maternal Grandmother      No Known Problems Maternal Grandfather      No Known Problems Paternal Grandmother      No Known Problems Paternal Grandfather      Amblyopia Neg Hx      Blindness Neg Hx      Cancer Neg Hx      Hypertension Neg Hx      Macular degeneration Neg Hx      Retinal detachment Neg Hx      Strabismus Neg Hx      Stroke Neg Hx      Thyroid disease Neg Hx      Glaucoma Neg Hx         Social History:  Social History[1]    Allergies:  No known drug allergies    Outpatient Medications:  Prior to Admission medications    Medication Sig Start Date End Date Taking? Authorizing Provider   aspirin 81 MG Chew CHEW AND SWALLOW 1 TABLET BY MOUTH ONCE DAILY 8/21/24  Yes Km Patel MD   atorvastatin (LIPITOR) 40 MG tablet Take 1 tablet (40 mg total) by mouth every evening. 12/19/24  Yes Km Patel MD   LORazepam (ATIVAN) 0.5 MG tablet Take 1 tablet (0.5 mg total) by mouth every 6 (six) hours as needed for Anxiety. 6/20/25 7/20/25 Yes Gina Diaz NP   losartan  "(COZAAR) 25 MG tablet Take 1 tablet (25 mg total) by mouth once daily. 12/19/24  Yes Km Patel MD   metoprolol succinate (TOPROL-XL) 25 MG 24 hr tablet Take 1 tablet (25 mg total) by mouth once daily. 6/16/25  Yes Km Patel MD   mirtazapine (REMERON) 7.5 MG Tab Take 1 tablet (7.5 mg total) by mouth every evening. 4/2/25 7/1/25 Yes Yash Blanchard PA-C   OZEMPIC 1 mg/dose (4 mg/3 mL) INJECT 1MG SUBCUTANEOUSLY  EVERY 7 DAYS 1/24/25  Yes Mary Braden MD   prazosin (MINIPRESS) 1 MG Cap Take 1 capsule (1 mg total) by mouth every evening. 4/2/25 7/1/25 Yes Yash Blanchard PA-C   sumatriptan (IMITREX) 50 MG tablet Take 1 tablet (50 mg total) by mouth every 2 (two) hours as needed for Migraine (If symptoms persist or return, may repeat dose (usually same as first dose) after 2 hours. Maximum dose: 100 mg/dose; 200 mg per 24 hours.). 6/20/25 7/20/25 Yes Gina Diaz NP   glipiZIDE 5 MG TR24 Take 1 tablet (5 mg total) by mouth daily with breakfast. 6/6/24 6/6/25  Mary Braden MD   vilazodone (VIIBRYD) 10 mg Tab tablet Take 1 tablet (10 mg total) by mouth once daily. 4/14/25 6/13/25  Yash Blanchard PA-C       Physical exam:    Vitals: /82 (BP Location: Right arm, Patient Position: Sitting)   Pulse 81   Ht 5' 10" (1.778 m)   Wt 87 kg (191 lb 12.8 oz)   BMI 27.52 kg/m²     General:   in no distress, well-nourished, well-developed, appears stated age.  Head/Neck:   Normocephalic,atraumatic  Pulm:  Non-labored breathing     Mental Status: Alert and oriented to person, time, place, situation. Speech spontaneous and fluent without paraphasias; no dysarthria  CN: III, IV, VI: EOM intact without nystagmus or diplopia.   VII: Facial movement full and symmetric.   VIII: Hearing grossly normal to conversation.  IX, X: Palate midline with symmetric elevation.    XII: Tongue midline without fasciculations.  Motor: Normal bulk throughout all four extremities.   RUE: " antigravity  LUE: antigravity   RLE: antigravity  LLE: antigravity  No tremors       Imaging:  All pertinent imaging was personally reviewed.             [1]   Social History  Tobacco Use    Smoking status: Never    Smokeless tobacco: Never   Substance Use Topics    Alcohol use: Yes    Drug use: No

## 2025-07-01 ENCOUNTER — PATIENT MESSAGE (OUTPATIENT)
Dept: NEUROLOGY | Facility: CLINIC | Age: 55
End: 2025-07-01
Payer: COMMERCIAL

## 2025-07-01 ENCOUNTER — PATIENT MESSAGE (OUTPATIENT)
Dept: FAMILY MEDICINE | Facility: CLINIC | Age: 55
End: 2025-07-01
Payer: COMMERCIAL

## 2025-07-01 DIAGNOSIS — E11.65 TYPE 2 DIABETES MELLITUS WITH HYPERGLYCEMIA, WITHOUT LONG-TERM CURRENT USE OF INSULIN: ICD-10-CM

## 2025-07-02 DIAGNOSIS — G43.101 MIGRAINE WITH AURA AND WITH STATUS MIGRAINOSUS, NOT INTRACTABLE: ICD-10-CM

## 2025-07-02 DIAGNOSIS — G43.919 INTRACTABLE MIGRAINE WITHOUT STATUS MIGRAINOSUS, UNSPECIFIED MIGRAINE TYPE: Primary | ICD-10-CM

## 2025-07-02 RX ORDER — GLIPIZIDE 5 MG/1
5 TABLET, FILM COATED, EXTENDED RELEASE ORAL
Qty: 90 TABLET | Refills: 3 | Status: SHIPPED | OUTPATIENT
Start: 2025-07-02 | End: 2026-07-02

## 2025-07-02 RX ORDER — ATOGEPANT 60 MG/1
60 TABLET ORAL DAILY
Qty: 30 TABLET | Refills: 5 | Status: SHIPPED | OUTPATIENT
Start: 2025-07-02

## 2025-07-02 NOTE — TELEPHONE ENCOUNTER
No care due was identified.  [facility] Embedded Care Due Messages. Reference number: [id number].   7/02/2025 10:46:04 AM CDT

## 2025-07-02 NOTE — PROGRESS NOTES
Patient reports that amitriptyline is not working.  He has now failed both amitriptyline and a beta-blocker for prevention of migraines.    We will escalate therapy to Qulipta.

## 2025-07-11 ENCOUNTER — PATIENT MESSAGE (OUTPATIENT)
Dept: FAMILY MEDICINE | Facility: CLINIC | Age: 55
End: 2025-07-11
Payer: COMMERCIAL

## 2025-07-21 ENCOUNTER — PATIENT MESSAGE (OUTPATIENT)
Dept: FAMILY MEDICINE | Facility: CLINIC | Age: 55
End: 2025-07-21
Payer: COMMERCIAL

## 2025-07-21 DIAGNOSIS — E11.65 TYPE 2 DIABETES MELLITUS WITH HYPERGLYCEMIA, WITHOUT LONG-TERM CURRENT USE OF INSULIN: ICD-10-CM

## 2025-07-23 RX ORDER — SEMAGLUTIDE 1.34 MG/ML
1 INJECTION, SOLUTION SUBCUTANEOUS
Qty: 9 ML | Refills: 0 | Status: SHIPPED | OUTPATIENT
Start: 2025-07-23

## 2025-07-23 NOTE — TELEPHONE ENCOUNTER
No care due was identified.  [facility] Embedded Care Due Messages. Reference number: [id number].   7/23/2025 10:59:47 AM CDT

## 2025-08-05 ENCOUNTER — OFFICE VISIT (OUTPATIENT)
Facility: CLINIC | Age: 55
End: 2025-08-05
Payer: COMMERCIAL

## 2025-08-05 DIAGNOSIS — F51.01 PRIMARY INSOMNIA: ICD-10-CM

## 2025-08-05 DIAGNOSIS — F41.9 ANXIETY: ICD-10-CM

## 2025-08-05 DIAGNOSIS — G43.919 INTRACTABLE MIGRAINE WITHOUT STATUS MIGRAINOSUS, UNSPECIFIED MIGRAINE TYPE: Primary | ICD-10-CM

## 2025-08-05 NOTE — PROGRESS NOTES
[facility] [place]     Audio and video Telehealth Visit     The patient location is: home  The chief complaint leading to consultation is: migraines  Visit type: Virtual visit with audio and video  Total time spent with patient:       Each patient to whom I provide medical services by telemedicine is:  (1) informed of the relationship between the physician and patient and the respective role of any other health care provider with respect to management of the patient; and (2) notified that they may decline to receive medical services by telemedicine and may withdraw from such care at any time. Patient verbally consented to receive this service via voice-only  telephone call.     This service was not originating from a related E/M service provided within the previous 7 days nor will  to an E/M service or procedure within the next 24 hours or my soonest available appointment.  Prevailing standard of care was able to be met in this audio-only visit.     [facility] [place]    Date: 8/5/25  Patient Name: Jaswinder Valle   MRN: [id number]   PCP: Mary Braden  Referring Provider: Ferny Schultz MD    Assessment:   Jaswinder Valle is a 55 y.o. male presenting for migraines. Case most consistent with secondary migraines. Even thought he probably has sleep apnea, primary insomnia, and resistant/depression, his headaches are severe, and he is above 49 yo. Due to this, will order MRI brain w contrast to rule out dangerous causes of secondary migraines. Even with Quilipta, he is still having 2-3 headaches a week. We will switch to Emgality (deferred Botox at this time). We will also switch from Maxalt (relative contraindication due to cardiac history) to Ubrelvy. Will send referral to sleep medicine. Will continue to follow with psychiatry. We spoke about headache hygiene and diary. We will plan a follow up in 3-4  months. He verbalized understanding and agreed with the plan.     Plan:     - MRI brain w contrast  - Sleep medicine referral   - Ubrelvy for attacks. Triptans contraindicated   - Switch Quilipta to Emgality for prevention  - Headache hygiene   - Continue to follow Psychiatry   - Follow up in 3-4 months     Problem List Items Addressed This Visit          Neuro    Intractable migraine without status migrainosus - Primary    Relevant Medications    ubrogepant (UBRELVY) 50 mg tablet    galcanezumab-gnlm 120 mg/mL PnIj    galcanezumab-gnlm 120 mg/mL PnIj    Other Relevant Orders    MRI Brain W WO Contrast    Ambulatory referral/consult to Sleep Disorders       Psychiatric    Anxiety       Other    Primary insomnia    Relevant Orders    Ambulatory referral/consult to Sleep Disorders       Addison Lopez MD    Subjective:   Patient seen in consultation at the request of Ferny Schultz MD for the evaluation of migraines. A copy of this note will be sent to the referring physician.      HPI 8/5/25:   Mr. Jaswinder Valle is a 55 y.o. male presenting for migraines. He has a history of DM, CAD, CHF, history of couple concussion () He stated that he started having headaches 3-4 years ago. He stated that this started happening in the setting of depression. Headaches are described as: no aura. At onset R sided > L. Eventually travels to the whole head. Slow progression to moderate/severe. Blurry vision. Photophobia and phonophobia present. Nausea, does vomit. Can last > 4 hours. Some mild neck pain. Some stiffness of the neck. He sleeps 2-3. Numerous reasons, he snores, he has bad tinnitus, ongoing headaches. Has woken himself up gasping for air. Has not rosenthal or lost weight recently. Does sometimes skip meals. Hydration is ok. Mood is depressive. He is doing counseling and he is taking ativan PRN (almost daily), mirtazapine, vilazodone. Has chronic tinnitus (10 years). He has tried quilipta, He is now having 2-3  headaches a week, down from 4 a week.     Per previous by Dr Schultz on 6/27/25:    Assessment:  Jaswinder Valle is a 55 y.o. male who presents with migraine.     Patient presents with a one year history of migraines.  He reports that he gets an average of three migraines per week with a visual aura.  His migraines start with unilateral blurriness of vision, usually in the left visual field.  This is followed by pain usually behind the left eye.  Symptoms are associated with nausea, photophobia, and phonophobia.  Patient reports he has had to call in from work due to his migraines.     Patient has a family history of migraines in his mother.  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     Prior:  Sumatriptan - not effective  Beta blocker - not effective        Plan:     - rizatriptan 5mg as needed  - amitriptyline 10mg nightly       Transfer to more permanent neurologist    PAST MEDICAL HISTORY:  Past Medical History:   Diagnosis Date    Diabetes mellitus type 2 without retinopathy 6/30/2016    Diabetes mellitus, type 2     Diabetes type 2, uncontrolled 6/29/2016    Nuclear sclerosis of both eyes 6/30/2016       PAST SURGICAL HISTORY:  Past Surgical History:   Procedure Laterality Date    COLONOSCOPY N/A 5/16/2018    Procedure: COLONOSCOPY;  Surgeon: Jovany Mendiola MD;  Location: [facility] ENDO;  Service: Endoscopy;  Laterality: N/A;    LEFT HEART CATHETERIZATION Left 7/6/2022    Procedure: Left heart cath;  Surgeon: Km Patel MD;  Location: [facility] CATH LAB;  Service: Cardiology;  Laterality: Left;  RN PREOP ON 6/29/22.--NEED H/P       CURRENT MEDS:  Current Medications[1]    ALLERGIES:  Review of patient's allergies indicates:   Allergen Reactions    No known drug allergies        FAMILY HISTORY:  Family History   Problem Relation Name Age of Onset    Cataracts Mother      Diabetes Mother      No Known Problems Father      No Known Problems Sister      No Known Problems Brother       No Known Problems Maternal Aunt      No Known Problems Maternal Uncle      No Known Problems Paternal Aunt      No Known Problems Paternal Uncle      No Known Problems Maternal Grandmother      No Known Problems Maternal Grandfather      No Known Problems Paternal Grandmother      No Known Problems Paternal Grandfather      Amblyopia Neg Hx      Blindness Neg Hx      Cancer Neg Hx      Hypertension Neg Hx      Macular degeneration Neg Hx      Retinal detachment Neg Hx      Strabismus Neg Hx      Stroke Neg Hx      Thyroid disease Neg Hx      Glaucoma Neg Hx         SOCIAL HISTORY:  Social History[2]    Review of Systems:  12 system review of systems is negative except for the symptoms mentioned in HPI.      Objective:   There were no vitals filed for this visit.  General: NAD, well nourished   Eyes: no tearing, discharge, no erythema   ENT: moist mucous membranes of the oral cavity, nares patent    Neck: Supple, full range of motion  Cardiovascular: Warm and well perfused, pulses equal and symmetrical  Lungs: Normal work of breathing, normal chest wall excursions  Skin: No rash, lesions, or breakdown on exposed skin  Psychiatry: Mood and affect are appropriate   Abdomen: soft, non tender, non distended  Extremeties: No cyanosis, clubbing or edema.    Neurological   MENTAL STATUS: Alert and oriented to person, place, and time. Attention and concentration within normal limits. Speech without dysarthria, able to name and repeat without difficulty. Recent and remote memory within normal limits   CRANIAL NERVES: Visual fields intact. PERRL. EOMI. Facial sensation intact. Face symmetrical. Hearing grossly intact. Full shoulder shrug bilaterally. Tongue protrudes midline   MOTOR: moves all 4 extremities     I spent a total of 45 minutes on the day of the visit.   This includes face to face time and non-face to face time preparing to see the patient (eg, review of tests), obtaining and/or reviewing separately obtained  history, documenting clinical information in the electronic or other health record, independently interpreting results and communicating results to the patient/family/caregiver, or care coordinator.      Addison Lopez MD  Neurology           [1]   Current Outpatient Medications   Medication Sig Dispense Refill    aspirin 81 MG Chew CHEW AND SWALLOW 1 TABLET BY MOUTH ONCE DAILY 30 tablet 0    atorvastatin (LIPITOR) 40 MG tablet Take 1 tablet (40 mg total) by mouth every evening. 90 tablet 3    galcanezumab-gnlm 120 mg/mL PnIj Inject 2 mLs (240 mg total) into the skin once. 240 mg loading dose (administered as two consecutive injections of 120 mg each) for 1 dose 2 mL 0    galcanezumab-gnlm 120 mg/mL PnIj Inject 1 mL (120 mg total) into the skin every 28 days. maintenance dose 3 mL 3    glipiZIDE 5 MG TR24 Take 1 tablet (5 mg total) by mouth daily with breakfast. 90 tablet 3    LORazepam (ATIVAN) 0.5 MG tablet Take 1 tablet (0.5 mg total) by mouth every 6 (six) hours as needed for Anxiety. 2 tablet 0    losartan (COZAAR) 25 MG tablet Take 1 tablet (25 mg total) by mouth once daily. 90 tablet 3    metoprolol succinate (TOPROL-XL) 25 MG 24 hr tablet Take 1 tablet (25 mg total) by mouth once daily. 90 tablet 3    mirtazapine (REMERON) 7.5 MG Tab Take 1 tablet (7.5 mg total) by mouth every evening. 90 tablet 0    prazosin (MINIPRESS) 1 MG Cap Take 1 capsule (1 mg total) by mouth every evening. 90 capsule 0    semaglutide (OZEMPIC) 1 mg/dose (4 mg/3 mL) Inject 1 mg into the skin every 7 days. 9 mL 0    ubrogepant (UBRELVY) 50 mg tablet Take 1 tablet (50 mg total) by mouth as needed for Migraine. If symptoms persist or return, may repeat dose after 2 hours. Maximum: 200 mg per 24 hours 8 tablet 2    vilazodone (VIIBRYD) 10 mg Tab tablet Take 1 tablet (10 mg total) by mouth once daily. 30 tablet 1     No current facility-administered medications for this visit.   [2]   Social History  Tobacco Use    Smoking status: Never     Smokeless tobacco: Never   Substance Use Topics    Alcohol use: Yes    Drug use: No

## 2025-08-08 ENCOUNTER — HOSPITAL ENCOUNTER (OUTPATIENT)
Dept: RADIOLOGY | Facility: HOSPITAL | Age: 55
Discharge: HOME | End: 2025-08-08
Payer: COMMERCIAL

## 2025-08-08 DIAGNOSIS — G43.919 INTRACTABLE MIGRAINE WITHOUT STATUS MIGRAINOSUS, UNSPECIFIED MIGRAINE TYPE: ICD-10-CM

## 2025-08-08 PROCEDURE — 70450 CT HEAD/BRAIN W/O DYE: CPT | Mod: TC,PN

## 2025-08-08 PROCEDURE — 70450 CT HEAD/BRAIN W/O DYE: CPT | Mod: 26,,, | Performed by: RADIOLOGY

## (undated) DEVICE — KIT HAND CONTROL HIGH PRESSUR

## (undated) DEVICE — CATH EMPULSE ANGLED 5FR PIGTAI

## (undated) DEVICE — PAD DEFIB CADENCE ADULT R2

## (undated) DEVICE — WIRE GUIDE SAFE-T-J .035 260CM

## (undated) DEVICE — OMNIPAQUE 350MG 150ML VIAL

## (undated) DEVICE — PACK CATH LAB

## (undated) DEVICE — KIT GLIDESHEATH SLEND 6FR 10CM

## (undated) DEVICE — CATH DXTERITY JL35 100CM 5FR

## (undated) DEVICE — CATH DXTERITY JR40 100CM 5FR

## (undated) DEVICE — PAD RADI FEMORAL

## (undated) DEVICE — HEMOSTAT VASC BAND REG 24CM

## (undated) DEVICE — KIT SYR REUSABLE

## (undated) DEVICE — KIT MANIFOLD LOW PRESS TUBING